# Patient Record
Sex: FEMALE | Race: WHITE | Employment: OTHER | URBAN - METROPOLITAN AREA
[De-identification: names, ages, dates, MRNs, and addresses within clinical notes are randomized per-mention and may not be internally consistent; named-entity substitution may affect disease eponyms.]

---

## 2017-03-15 ENCOUNTER — HOSPITAL ENCOUNTER (OUTPATIENT)
Dept: MAMMOGRAPHY | Age: 72
Discharge: HOME | End: 2017-03-15
Attending: FAMILY MEDICINE
Payer: MEDICARE

## 2017-03-15 DIAGNOSIS — Z12.31 VISIT FOR SCREENING MAMMOGRAM: ICD-10-CM

## 2017-03-15 PROCEDURE — 77063 BREAST TOMOSYNTHESIS BI: CPT

## 2017-03-28 ENCOUNTER — HOSPITAL ENCOUNTER (OUTPATIENT)
Dept: ULTRASOUND IMAGING | Age: 72
Discharge: HOME | End: 2017-03-28
Attending: INTERNAL MEDICINE
Payer: MEDICARE

## 2017-03-28 DIAGNOSIS — K74.60 NON-ALCOHOLIC CIRRHOSIS (HCC): ICD-10-CM

## 2017-03-28 PROCEDURE — 76705 ECHO EXAM OF ABDOMEN: CPT

## 2017-03-28 RX ORDER — GLUCOSAMINE/CHONDR SU A SOD 750-600 MG
TABLET ORAL DAILY
COMMUNITY

## 2017-04-06 ENCOUNTER — ANESTHESIA EVENT (OUTPATIENT)
Dept: ENDOSCOPY | Age: 72
End: 2017-04-06
Payer: MEDICARE

## 2017-04-07 ENCOUNTER — ANESTHESIA (OUTPATIENT)
Dept: ENDOSCOPY | Age: 72
End: 2017-04-07
Payer: MEDICARE

## 2017-04-07 ENCOUNTER — SURGERY (OUTPATIENT)
Age: 72
End: 2017-04-07

## 2017-04-07 ENCOUNTER — HOSPITAL ENCOUNTER (OUTPATIENT)
Age: 72
Setting detail: OUTPATIENT SURGERY
Discharge: HOME | End: 2017-04-07
Attending: INTERNAL MEDICINE | Admitting: INTERNAL MEDICINE
Payer: MEDICARE

## 2017-04-07 VITALS
SYSTOLIC BLOOD PRESSURE: 121 MMHG | TEMPERATURE: 97 F | HEART RATE: 61 BPM | HEIGHT: 68 IN | RESPIRATION RATE: 16 BRPM | WEIGHT: 176 LBS | DIASTOLIC BLOOD PRESSURE: 65 MMHG | BODY MASS INDEX: 26.67 KG/M2 | OXYGEN SATURATION: 100 %

## 2017-04-07 LAB
BUN BLD-MCNC: 25 MG/DL (ref 7–18)
CHLORIDE BLD-SCNC: 106 MMOL/L (ref 100–108)
GLUCOSE BLD STRIP.AUTO-MCNC: 101 MG/DL (ref 74–106)
GLUCOSE BLD STRIP.AUTO-MCNC: 95 MG/DL (ref 70–110)
HCT VFR BLD CALC: 32 % (ref 36–49)
HGB BLD-MCNC: 10.9 G/DL (ref 12–16)
POTASSIUM BLD-SCNC: 3.7 MMOL/L (ref 3.5–5.5)
SODIUM BLD-SCNC: 144 MMOL/L (ref 136–145)

## 2017-04-07 PROCEDURE — 82947 ASSAY GLUCOSE BLOOD QUANT: CPT

## 2017-04-07 PROCEDURE — 82962 GLUCOSE BLOOD TEST: CPT

## 2017-04-07 PROCEDURE — 76040000019: Performed by: INTERNAL MEDICINE

## 2017-04-07 PROCEDURE — 77030008565 HC TBNG SUC IRR ERBE -B: Performed by: INTERNAL MEDICINE

## 2017-04-07 PROCEDURE — 74011250636 HC RX REV CODE- 250/636: Performed by: NURSE ANESTHETIST, CERTIFIED REGISTERED

## 2017-04-07 PROCEDURE — 76060000031 HC ANESTHESIA FIRST 0.5 HR: Performed by: INTERNAL MEDICINE

## 2017-04-07 PROCEDURE — 74011000250 HC RX REV CODE- 250: Performed by: NURSE ANESTHETIST, CERTIFIED REGISTERED

## 2017-04-07 PROCEDURE — 88342 IMHCHEM/IMCYTCHM 1ST ANTB: CPT | Performed by: INTERNAL MEDICINE

## 2017-04-07 PROCEDURE — 77030018846 HC SOL IRR STRL H20 ICUM -A: Performed by: INTERNAL MEDICINE

## 2017-04-07 PROCEDURE — 74011250636 HC RX REV CODE- 250/636

## 2017-04-07 PROCEDURE — 77030019988 HC FCPS ENDOSC DISP BSC -B: Performed by: INTERNAL MEDICINE

## 2017-04-07 PROCEDURE — 88305 TISSUE EXAM BY PATHOLOGIST: CPT | Performed by: INTERNAL MEDICINE

## 2017-04-07 RX ORDER — PROPOFOL 10 MG/ML
INJECTION, EMULSION INTRAVENOUS AS NEEDED
Status: DISCONTINUED | OUTPATIENT
Start: 2017-04-07 | End: 2017-04-07 | Stop reason: HOSPADM

## 2017-04-07 RX ORDER — SODIUM CHLORIDE, SODIUM LACTATE, POTASSIUM CHLORIDE, CALCIUM CHLORIDE 600; 310; 30; 20 MG/100ML; MG/100ML; MG/100ML; MG/100ML
75 INJECTION, SOLUTION INTRAVENOUS CONTINUOUS
Status: DISCONTINUED | OUTPATIENT
Start: 2017-04-07 | End: 2017-04-07 | Stop reason: HOSPADM

## 2017-04-07 RX ORDER — SODIUM CHLORIDE 0.9 % (FLUSH) 0.9 %
5-10 SYRINGE (ML) INJECTION AS NEEDED
Status: DISCONTINUED | OUTPATIENT
Start: 2017-04-07 | End: 2017-04-07 | Stop reason: HOSPADM

## 2017-04-07 RX ORDER — INSULIN LISPRO 100 [IU]/ML
INJECTION, SOLUTION INTRAVENOUS; SUBCUTANEOUS ONCE
Status: DISCONTINUED | OUTPATIENT
Start: 2017-04-07 | End: 2017-04-07 | Stop reason: HOSPADM

## 2017-04-07 RX ORDER — FAMOTIDINE 10 MG/ML
20 INJECTION INTRAVENOUS ONCE
Status: COMPLETED | OUTPATIENT
Start: 2017-04-07 | End: 2017-04-07

## 2017-04-07 RX ORDER — MAGNESIUM SULFATE 100 %
4 CRYSTALS MISCELLANEOUS AS NEEDED
Status: DISCONTINUED | OUTPATIENT
Start: 2017-04-07 | End: 2017-04-07 | Stop reason: HOSPADM

## 2017-04-07 RX ORDER — DEXTROSE 50 % IN WATER (D50W) INTRAVENOUS SYRINGE
25-50 AS NEEDED
Status: DISCONTINUED | OUTPATIENT
Start: 2017-04-07 | End: 2017-04-07 | Stop reason: HOSPADM

## 2017-04-07 RX ORDER — SODIUM CHLORIDE 0.9 % (FLUSH) 0.9 %
5-10 SYRINGE (ML) INJECTION EVERY 8 HOURS
Status: DISCONTINUED | OUTPATIENT
Start: 2017-04-07 | End: 2017-04-07 | Stop reason: HOSPADM

## 2017-04-07 RX ORDER — ONDANSETRON 2 MG/ML
4 INJECTION INTRAMUSCULAR; INTRAVENOUS AS NEEDED
Status: DISCONTINUED | OUTPATIENT
Start: 2017-04-07 | End: 2017-04-07 | Stop reason: HOSPADM

## 2017-04-07 RX ADMIN — PROPOFOL 20 MG: 10 INJECTION, EMULSION INTRAVENOUS at 10:09

## 2017-04-07 RX ADMIN — SODIUM CHLORIDE, SODIUM LACTATE, POTASSIUM CHLORIDE, AND CALCIUM CHLORIDE 75 ML/HR: 600; 310; 30; 20 INJECTION, SOLUTION INTRAVENOUS at 09:22

## 2017-04-07 RX ADMIN — FAMOTIDINE 20 MG: 10 INJECTION, SOLUTION INTRAVENOUS at 09:22

## 2017-04-07 NOTE — H&P
History and Physical reviewed; I have examined the patient and there are no pertinent changes. Justin Roberts MD, MD   10:05 AM 4/7/2017  [facility], [address] [place] [address]  [email or web address]

## 2017-04-07 NOTE — PROCEDURES
Tatianna  [facility], [address]      Brief Procedure Note    Newton Travis  [birth year]  [id number]    Date of Procedure: 4/7/2017    Preoperative diagnosis: Esophageal and gastric varices ([address] [I85.00, I86.4]    Postoperative diagnosis:  Gastric Ulcer, Grade 1 Varicies    Type of Anesthesia: MAC (monitered anesthesia care)    Description of Findings: same as post op dx    Procedure: Procedure(s):  ENDOSCOPY with Bx's    :  Dr. Peterson Adames MD    Assistant(s): [unfilled]    Type of Anesthesia:MAC     EBL:None    Specimens:   ID Type Source Tests Collected by Time Destination   1 : Gastric Ulcer Bx's Preservative Stomach  Peterson Adames MD 4/7/2017 1009 Pathology       Findings: See printed and scanned procedure note    Complications: None    Dr. Peterson Adames MD  4/7/2017  10:17 AM

## 2017-04-07 NOTE — DISCHARGE INSTRUCTIONS
Upper GI Endoscopy: What to Expect at [address] [place] [address]  After you have an endoscopy, you will stay at the hospital or clinic for 1 to 2 hours. This will allow the medicine to wear off. You will be able to go home after your doctor or nurse checks to make sure you are not having any problems. You may have to stay overnight if you had treatment during the test. You may have a sore throat for a day or two after the test.  This care sheet gives you a general idea about what to expect after the test.  How can you care for yourself at home? Activity  · Rest as much as you need to after you go home. · You should be able to go back to your usual activities the day after the test.  Diet  · Follow your doctor's directions for eating after the test.  · Drink plenty of fluids (unless your doctor has told you not to). Medications  · If you have a sore throat the day after the test, use an over-the-counter spray to numb your throat. Follow-up care is a key part of your treatment and safety. Be sure to make and go to all appointments, and call your doctor if you are having problems. It's also a good idea to know your test results and keep a list of the medicines you take. When should you call for help? Call 911 anytime you think you may need emergency care. For example, call if:  · You passed out (lost consciousness). · You cough up blood. · You vomit blood or what looks like coffee grounds. · You pass maroon or very bloody stools. Call your doctor now or seek immediate medical care if:  · You have trouble swallowing. · You have belly pain. · Your stools are black and tarlike or have streaks of blood. · You are sick to your stomach or cannot keep fluids down. Watch closely for changes in your health, and be sure to contact your doctor if:  · Your throat still hurts after a day or two. · You do not get better as expected. Where can you learn more? Go to http://susie.info/.   Enter J454 in the search box to learn more about \"Upper GI Endoscopy: What to Expect at Home. \"  Current as of: August 9, 2016  Content Version: 11.2  © 2969-3376 [employer]. Care instructions adapted under license by [employer] (which disclaims liability or warranty for this information). If you have questions about a medical condition or this instruction, always ask your healthcare professional. [place] [address] any warranty or liability for your use of this information. DISCHARGE SUMMARY from Nurse    The following personal items are in your possession at time of discharge:    Dental Appliances: Uppers, Partials, Lowers  Visual Aid: Glasses                            PATIENT INSTRUCTIONS:    After general anesthesia or intravenous sedation, for 24 hours or while taking prescription Narcotics:  · Limit your activities  · Do not drive and operate hazardous machinery  · Do not make important personal or business decisions  · Do  not drink alcoholic beverages  · If you have not urinated within 8 hours after discharge, please contact your surgeon on call. Report the following to your surgeon:  · Excessive pain, swelling, redness or odor of or around the surgical area  · Temperature over 100.5  · Nausea and vomiting lasting longer than 4 hours or if unable to take medications  · Any signs of decreased circulation or nerve impairment to extremity: change in color, persistent  numbness, tingling, coldness or increase pain  · Any questions        These are general instructions for a healthy lifestyle:    No smoking/ No tobacco products/ Avoid exposure to second hand smoke    Surgeon General's Warning:  Quitting smoking now greatly reduces serious risk to your health.     Obesity, smoking, and sedentary lifestyle greatly increases your risk for illness    A healthy diet, regular physical exercise & weight monitoring are important for maintaining a healthy lifestyle    You may be retaining fluid if you have a history of heart failure or if you experience any of the following symptoms:  Weight gain of 3 pounds or more overnight or 5 pounds in a week, increased swelling in our hands or feet or shortness of breath while lying flat in bed. Please call your doctor as soon as you notice any of these symptoms; do not wait until your next office visit. Recognize signs and symptoms of STROKE:    F-face looks uneven    A-arms unable to move or move unevenly    S-speech slurred or non-existent    T-time-call 911 as soon as signs and symptoms begin-DO NOT go       Back to bed or wait to see if you get better-TIME IS BRAIN. Warning Signs of HEART ATTACK     Call 911 if you have these symptoms:   Chest discomfort. Most heart attacks involve discomfort in the center of the chest that lasts more than a few minutes, or that goes away and comes back. It can feel like uncomfortable pressure, squeezing, fullness, or pain.  Discomfort in other areas of the upper body. Symptoms can include pain or discomfort in one or both arms, the back, neck, jaw, or stomach.  Shortness of breath with or without chest discomfort.  Other signs may include breaking out in a cold sweat, nausea, or lightheadedness. Don't wait more than five minutes to call 911 - MINUTES MATTER! Fast action can save your life. Calling 911 is almost always the fastest way to get lifesaving treatment. Emergency Medical Services staff can begin treatment when they arrive -- up to an hour sooner than if someone gets to the hospital by car. The discharge information has been reviewed with the patient. The patient verbalized understanding. Discharge medications reviewed with the patient and appropriate educational materials and side effects teaching were provided.

## 2017-04-07 NOTE — ANESTHESIA POSTPROCEDURE EVALUATION
Post-Anesthesia Evaluation and Assessment    Patient: Rafy Page MRN: [id number]  SSN: [id number]    YOB: 1945  Age: 70 y.o. Sex: female      Data from PACU flowsheet    Cardiovascular Function/Vital Signs  Visit Vitals    /65    Pulse 61    Temp 36.1 °C (97 °F)    Resp 16    Ht 5' 8\" (1.727 m)    Wt 79.8 kg (176 lb)    SpO2 100%    BMI 26.76 kg/m2       Patient is status post MAC anesthesia for Procedure(s):  ENDOSCOPY with Bx's. Nausea/Vomiting: controlled    Postoperative hydration reviewed and adequate. Pain:  Pain Scale 1: Numeric (0 - 10) (04/07/17 1019)  Pain Intensity 1: 0 (04/07/17 1019)   Managed      Mental Status and Level of Consciousness: Alert and oriented     Pulmonary Status:   O2 Device: Room air (04/07/17 1019)   Adequate oxygenation and airway patent    Complications related to anesthesia: None    Post-anesthesia assessment completed.  No concerns    Signed By: Oscar Fry MD     April 7, 2017

## 2017-04-07 NOTE — ANESTHESIA PREPROCEDURE EVALUATION
Anesthetic History   No history of anesthetic complications       Comments:   Risk Factors for Postoperative nausea/vomiting:       History of postoperative nausea/vomiting? NO       Female? YES       Motion sickness? NO       Intended opioid administration for postoperative analgesia?   NO     Review of Systems / Medical History  Patient summary reviewed and pertinent labs reviewed    Pulmonary  Within defined limits                 Neuro/Psych   Within defined limits           Cardiovascular    Hypertension                   GI/Hepatic/Renal           Liver disease     Endo/Other    Diabetes: poorly controlled    Blood dyscrasia and anemia     Other Findings            Physical Exam    Airway  Mallampati: II  TM Distance: 4 - 6 cm  Neck ROM: normal range of motion   Mouth opening: Normal     Cardiovascular    Rhythm: regular  Rate: normal         Dental    Dentition: Full upper dentures and Lower partial plate     Pulmonary  Breath sounds clear to auscultation               Abdominal  GI exam deferred       Other Findings            Anesthetic Plan    ASA: 3  Anesthesia type: MAC          Induction: Intravenous  Anesthetic plan and risks discussed with: Patient

## 2017-04-07 NOTE — IP AVS SNAPSHOT
Phillip Cameron 
 
 
 [address] [id number] 
[phone] Patient: Buck Sales MRN: [id number] :1945 You are allergic to the following Allergen Reactions Augmentin (Amoxicillin-Pot Clavulanate) Other (comments) Mouth sores Cefepime Rash Arm rashes, chest rashes Ciprofloxacin Rash Other reaction(s): mild rash/itching Only w/ IV Cipro, takes PO Cipro without problem Recent Documentation Height Weight BMI OB Status Smoking Status 1.727 m 79.8 kg 26.76 kg/m2 Postmenopausal Never Smoker Emergency Contacts Name Discharge Info Relation Home Work Mobile Keysha CAREGIVER [3] Son [22] [phone] [facility] Michelet  DISCHARGE CAREGIVER [3] Daughter [21] [phone] [address] CAREGIVER [3] Son [22] [phone] [address]  Child [2] [phone] About your hospitalization You were admitted on:  2017 You last received care in the:  [facility] PACU You were discharged on:  2017 Unit phone number:  [phone] Why you were hospitalized Your primary diagnosis was:  Not on File Providers Seen During Your Hospitalizations Provider Role Specialty Primary office phone Martha Montenegro MD Attending Provider Gastroenterology [phone] Your Primary Care Physician (PCP) Primary Care Physician Office Phone Office Fax Ewa Martin [phone] Follow-up Information Follow up With Details Comments Contact Info Tristan Young MD   [facility] [facility] 
[phone] Martha Montenegro MD Follow up in 4 month(s)  [address] [facility] [address] [facility] [address] 
[phone] Current Discharge Medication List  
  
 CONTINUE these medications which have CHANGED Dose & Instructions Dispensing Information Comments Morning Noon Evening Bedtime  
 ascorbic acid (vitamin C) 250 mg tablet Commonly known as:  VITAMIN C What changed:   
- how much to take - when to take this Your last dose was: Your next dose is:    
   
   
 Dose:  250 mg Take 1 Tab by mouth two (2) times daily (with meals). Quantity:  30 Tab Refills:  0 CONTINUE these medications which have NOT CHANGED Dose & Instructions Dispensing Information Comments Morning Noon Evening Bedtime  
 acetaminophen 325 mg tablet Commonly known as:  TYLENOL Your last dose was: Your next dose is:    
   
   
 Dose:  650 mg Take 2 Tabs by mouth every four (4) hours as needed for Pain or Fever. Quantity:  15 Tab Refills:  0  
     
   
   
   
  
 aspirin delayed-release 81 mg tablet Your last dose was: Your next dose is:    
   
   
 Dose:  81 mg Take 1 Tab by mouth daily. Quantity:  30 Tab Refills:  5 Biotin 2,500 mcg Cap Your last dose was: Your next dose is: Take  by mouth daily. Refills:  0  
     
   
   
   
  
 cholecalciferol 1,000 unit tablet Commonly known as:  VITAMIN D3 Your last dose was: Your next dose is:    
   
   
 Dose:  2000 Units Take 2 Tabs by mouth daily. Quantity:  30 Tab Refills:  0  
     
   
   
   
  
 ferrous sulfate 325 mg (65 mg iron) tablet Your last dose was: Your next dose is:    
   
   
 Dose:  325 mg Take 1 Tab by mouth two (2) times daily (with meals). Quantity:  30 Tab Refills:  0  
     
   
   
   
  
 potassium chloride 20 mEq tablet Commonly known as:  K-DUR, KLOR-CON Your last dose was: Your next dose is:    
   
   
 Dose:  20 mEq Take 20 mEq by mouth daily. Refills:  0 simvastatin 40 mg tablet Commonly known as:  ZOCOR Your last dose was: Your next dose is:    
   
   
 Dose:  20 mg Take 20 mg by mouth nightly. Refills:  0  
     
   
   
   
  
 TRAVATAN Z 0.004 % ophthalmic solution Generic drug:  travoprost  
   
Your last dose was: Your next dose is:    
   
   
 Dose:  1 Drop Administer 1 Drop to both eyes every evening. Refills:  0  
     
   
   
   
  
 VITAMIN D2 PO Your last dose was: Your next dose is: Take  by mouth every seven (7) days. Refills:  0 Discharge Instructions Upper GI Endoscopy: What to Expect at [facility] Your Recovery After you have an endoscopy, you will stay at the hospital or clinic for 1 to 2 hours. This will allow the medicine to wear off. You will be able to go home after your doctor or nurse checks to make sure you are not having any problems. You may have to stay overnight if you had treatment during the test. You may have a sore throat for a day or two after the test. 
This care sheet gives you a general idea about what to expect after the test. 
How can you care for yourself at home? Activity · Rest as much as you need to after you go home. · You should be able to go back to your usual activities the day after the test. 
Diet · Follow your doctor's directions for eating after the test. 
· Drink plenty of fluids (unless your doctor has told you not to). Medications · If you have a sore throat the day after the test, use an over-the-counter spray to numb your throat. Follow-up care is a key part of your treatment and safety. Be sure to make and go to all appointments, and call your doctor if you are having problems. It's also a good idea to know your test results and keep a list of the medicines you take. When should you call for help? Call 911 anytime you think you may need emergency care. For example, call if: · You passed out (lost consciousness). · You cough up blood. · You vomit blood or what looks like coffee grounds. · You pass maroon or very bloody stools. Call your doctor now or seek immediate medical care if: 
· You have trouble swallowing. · You have belly pain. · Your stools are black and tarlike or have streaks of blood. · You are sick to your stomach or cannot keep fluids down. Watch closely for changes in your health, and be sure to contact your doctor if: 
· Your throat still hurts after a day or two. · You do not get better as expected. Where can you learn more? Go to http://sybil-layo.info/. Enter [phone] in the search box to learn more about \"Upper GI Endoscopy: What to Expect at Home. \" Current as of: August 9, 2016 Content Version: 11.2 © 3545-8621 [employer]. Care instructions adapted under license by [employer] (which disclaims liability or warranty for this information). If you have questions about a medical condition or this instruction, always ask your healthcare professional. [place] [address] any warranty or liability for your use of this information. DISCHARGE SUMMARY from Nurse The following personal items are in your possession at time of discharge: 
 
Dental Appliances: Uppers, Partials, Lowers Visual Aid: Glasses PATIENT INSTRUCTIONS: 
 
After general anesthesia or intravenous sedation, for 24 hours or while taking prescription Narcotics: · Limit your activities · Do not drive and operate hazardous machinery · Do not make important personal or business decisions · Do  not drink alcoholic beverages · If you have not urinated within 8 hours after discharge, please contact your surgeon on call. Report the following to your surgeon: 
· Excessive pain, swelling, redness or odor of or around the surgical area · Temperature over 100.5 · Nausea and vomiting lasting longer than 4 hours or if unable to take medications · Any signs of decreased circulation or nerve impairment to extremity: change in color, persistent  numbness, tingling, coldness or increase pain · Any questions These are general instructions for a healthy lifestyle: No smoking/ No tobacco products/ Avoid exposure to second hand smoke Surgeon General's Warning:  Quitting smoking now greatly reduces serious risk to your health. Obesity, smoking, and sedentary lifestyle greatly increases your risk for illness A healthy diet, regular physical exercise & weight monitoring are important for maintaining a healthy lifestyle You may be retaining fluid if you have a history of heart failure or if you experience any of the following symptoms:  Weight gain of 3 pounds or more overnight or 5 pounds in a week, increased swelling in our hands or feet or shortness of breath while lying flat in bed. Please call your doctor as soon as you notice any of these symptoms; do not wait until your next office visit. Recognize signs and symptoms of STROKE: 
 
F-face looks uneven A-arms unable to move or move unevenly S-speech slurred or non-existent T-time-call 911 as soon as signs and symptoms begin-DO NOT go Back to bed or wait to see if you get better-TIME IS BRAIN. Warning Signs of HEART ATTACK Call 911 if you have these symptoms: 
? Chest discomfort. Most heart attacks involve discomfort in the center of the chest that lasts more than a few minutes, or that goes away and comes back. It can feel like uncomfortable pressure, squeezing, fullness, or pain. ? Discomfort in other areas of the upper body. Symptoms can include pain or discomfort in one or both arms, the back, neck, jaw, or stomach. ? Shortness of breath with or without chest discomfort. ? Other signs may include breaking out in a cold sweat, nausea, or lightheadedness. Don't wait more than five minutes to call [address]! Fast action can save your life. Calling 911 is almost always the fastest way to get lifesaving treatment. Emergency Medical Services staff can begin treatment when they arrive  up to an hour sooner than if someone gets to the hospital by car. The discharge information has been reviewed with the {PATIENT PARENT GUARDIAN:75524}. The {PATIENT PARENT GUARDIAN:54097} verbalized understanding. Discharge medications reviewed with the {Dishcarge meds reviewed [facility]76[facility]} and appropriate educational materials and side effects teaching were provided. Discharge Orders None Introducing [facility] [place] [facility]! Dear Emerita Aguiar: 
Thank you for requesting a Yapta account. Our records indicate that you have previously registered for a Yapta account but its currently inactive. Please call our Yapta support line at [phone]. Additional Information If you have questions, please visit the Frequently Asked Questions section of the Yapta website at [email or web address] Remember, Yapta is NOT to be used for urgent needs. For medical emergencies, dial 911. Now available from your iPhone and Android! General Information Please provide this summary of care documentation to your next provider. Patient Signature:  ____________________________________________________________ Date:  ____________________________________________________________  
  
Brendan Lacy Provider Signature:  ____________________________________________________________ Date:  ____________________________________________________________

## 2017-04-22 ENCOUNTER — HOSPITAL ENCOUNTER (EMERGENCY)
Age: 72
Discharge: HOME | End: 2017-04-22
Attending: EMERGENCY MEDICINE
Payer: MEDICARE

## 2017-04-22 VITALS
SYSTOLIC BLOOD PRESSURE: 119 MMHG | DIASTOLIC BLOOD PRESSURE: 47 MMHG | TEMPERATURE: 99.2 F | HEART RATE: 66 BPM | RESPIRATION RATE: 12 BRPM | OXYGEN SATURATION: 97 %

## 2017-04-22 DIAGNOSIS — G51.0 BELL'S PALSY: Primary | ICD-10-CM

## 2017-04-22 PROCEDURE — 99283 EMERGENCY DEPT VISIT LOW MDM: CPT

## 2017-04-22 RX ORDER — VALACYCLOVIR HYDROCHLORIDE 1 G/1
1000 TABLET, FILM COATED ORAL 3 TIMES DAILY
Qty: 21 TAB | Refills: 0 | Status: SHIPPED | OUTPATIENT
Start: 2017-04-22 | End: 2017-04-29

## 2017-04-22 RX ORDER — PREDNISONE 20 MG/1
60 TABLET ORAL DAILY
Qty: 21 TAB | Refills: 0 | Status: SHIPPED | OUTPATIENT
Start: 2017-04-22 | End: 2017-04-29

## 2017-04-22 NOTE — DISCHARGE INSTRUCTIONS
Bell's Palsy: Care Instructions  Your Care Instructions    Bell's palsy is paralysis or weakness of the muscles on one side of the face. Often people with Bell's palsy have a droop on one side of the mouth and have trouble completely shutting the eye on the same side. Bell's palsy can also interfere with the sense of taste. These things happen when a nerve in your face becomes inflamed. Bell's palsy is not caused by a stroke. The cause of the nerve inflammation is not known. But some experts think that a virus may cause it. Because of this, doctors sometimes prescribe antiviral medicine to treat it. You also may get medicine to reduce swelling. Bell's palsy usually gets better on its own in a few weeks or months. Follow-up care is a key part of your treatment and safety. Be sure to make and go to all appointments, and call your doctor if you are having problems. It's also a good idea to know your test results and keep a list of the medicines you take. How can you care for yourself at home? · Take your medicines exactly as prescribed. Call your doctor if you think you are having a problem with your medicine. You will get more details on the specific medicines your doctor prescribes. · Use artificial tears or ointment if your eyes are too dry. Bell's palsy can make your lower eyelid droop, causing a dry eye. · If you cannot completely close your eye, consider using an eye patch while you sleep. · Help yourself blink by using your finger to close and open your eyelid. This may help keep your eye moist.  · Wear glasses or goggles to keep dust and dirt out of your eye. · As feeling comes back to your face, massage your forehead, cheeks, and lips. Massage may make the muscles in your face stronger. · Brush and floss your teeth often to help prevent tooth decay. Bell's palsy can dry up the spit on one side of your mouth. This increases the risk of tooth decay. When should you call for help?   Call 911 anytime you think you may need emergency care. For example, call if:  · You have symptoms of a stroke. These may include:  ¨ Sudden numbness, tingling, weakness, or loss of movement in your face, arm, or leg, especially on only one side of your body. ¨ Sudden vision changes. ¨ Sudden trouble speaking. ¨ Sudden confusion or trouble understanding simple statements. ¨ Sudden problems with walking or balance. ¨ A sudden, severe headache that is different from past headaches. Call your doctor now or seek immediate medical care if:  · You have numbness or weakness that spreads beyond one side of your face. · You have a skin rash or eye pain or redness, or light bothers your eyes. · You have a new or worse headache. Watch closely for changes in your health, and be sure to contact your doctor if:  · You do not get better as expected. Where can you learn more? Go to http://sybil-layo.info/. Enter P168 in the search box to learn more about \"Bell's Palsy: Care Instructions. \"  Current as of: October 14, 2016  Content Version: 11.2  © 1600-7289 [employer]. Care instructions adapted under license by [employer] (which disclaims liability or warranty for this information). If you have questions about a medical condition or this instruction, always ask your healthcare professional. [place] [address] any warranty or liability for your use of this information.

## 2017-04-22 NOTE — ED PROVIDER NOTES
HPI Comments: 9:28 AM Alex Pimentel is a 70 y.o. female anemia, dyslipidemia, HYN, and thrombocytopoenia, who presents to the ED for the evaluation of R sided facial droop. Pt states that she noticed her R eye watering yesterday and facial droop started this morning. States that she doesn't hear a difference in her speech. No unusual numbness or tingling in her extremities, gait difficulty, unusual weight loss, facial pain or ear pain. States that she had a HA about 2 days ago, but has since resolved. Denies smoking or drinking. No relieving or exacerbating factors. No other complaints at this time. PCP: Rebekah West MD     The history is provided by the patient.         Past Medical History:   Diagnosis Date    Abscess of left kidney 2015    Acute deep vein thrombosis of right lower extremity ([address] 3/02/2015    Anticoagulated by anticoagulation treatment 3/06/2015    On Rivaroxaban    Candidal urinary tract infection 3/12/2015    Chronic anemia     Chronic diastolic heart failure (HCC)     Citrobacter infection 2/10/2015    Urinary tract infection    Decubitus ulcer of sacral region, stage 2     Dyslipidemia     Glaucoma     History of hypertension     History of kidney stones 2012    Hypertension     resolved    Hypoalbuminemia     Liver disease     non alcoholic cirrhosis    Obesity, Class II, BMI 35-39.9 (HCC)     Pneumothorax on right 2015    Postoperative anemia due to acute blood loss     Thrombocytopenia (HCC)     Dr. Cassie Tom    Type 2 diabetes mellitus ([facility]     diet controlled       Past Surgical History:   Procedure Laterality Date    HX  SECTION      HX COLOSTOMY  2015    HX LITHOTRIPSY      5 times    HX OTHER SURGICAL  1977    excision nodule thyroid    HX OTHER SURGICAL  2015    S/P CT-guided cholecystostomy tube placement (2015 - Dr. Jacobo Grullon)    HX OTHER SURGICAL Left 2015    S/P CT-guided left lower renal pole abscess drainage and drainage catheter placement (2/16/2015 - Dr. Gayathri Choe)    HX OTHER SURGICAL Left 2/17/2015    S/P Percutaneous drainage of abscess via anterior abdomen (2/17/2015 - Dr. Bridgett Dsa)    HX OTHER SURGICAL Right 2/27/2015    S/P Right chest tube insertion (2/27/2015 - Dr. Bridgett Das)    HX RENAL BIOPSY Left 2/16/2015    S/P CT-guided left lower renal pole biopsy (2/16/2015 - Dr. Gayathri Choe)         Family History:   Problem Relation Age of Onset    Heart Attack Father     Heart Attack Brother        Social History     Social History    Marital status:      Spouse name: N/A    Number of children: N/A    Years of education: N/A     Occupational History    Not on file. Social History Main Topics    Smoking status: Never Smoker    Smokeless tobacco: Not on file    Alcohol use No    Drug use: No    Sexual activity: No     Other Topics Concern    Not on file     Social History Narrative         ALLERGIES: Augmentin [amoxicillin-pot clavulanate]; Cefepime; and Ciprofloxacin    Review of Systems   Constitutional: Negative for chills, fatigue, fever and unexpected weight change. HENT: Negative for congestion and rhinorrhea. Respiratory: Negative for chest tightness and shortness of breath. Cardiovascular: Negative for chest pain, palpitations and leg swelling. Gastrointestinal: Negative for abdominal pain, nausea and vomiting. Genitourinary: Negative for dysuria. Musculoskeletal: Negative for back pain. Skin: Negative for rash. Neurological: Positive for facial asymmetry. Negative for dizziness and weakness. Psychiatric/Behavioral: The patient is not nervous/anxious. Vitals:    04/22/17 0927 04/22/17 0934   BP: 154/63 154/63   Pulse:  74   Resp: 16 16   Temp:  99.2 °F (37.3 °C)   SpO2: 99%         99% within normal limits     Physical Exam   Constitutional: She is oriented to person, place, and time.  She appears well-developed and well-nourished. No distress. HENT:   Head: Normocephalic and atraumatic. Right Ear: External ear normal.   Left Ear: External ear normal.   Nose: Nose normal.   Mouth/Throat: Oropharynx is clear and moist.   Eyes: Conjunctivae and EOM are normal. Pupils are equal, round, and reactive to light. No scleral icterus. Neck: Normal range of motion. Neck supple. No JVD present. No tracheal deviation present. No thyromegaly present. Cardiovascular: Normal rate, regular rhythm, normal heart sounds and intact distal pulses. Exam reveals no gallop and no friction rub. No murmur heard. Pulmonary/Chest: Effort normal and breath sounds normal. She exhibits no tenderness. Abdominal: Soft. Bowel sounds are normal. She exhibits no distension. There is no tenderness. There is no rebound and no guarding. Musculoskeletal: Normal range of motion. She exhibits no edema or tenderness. Lymphadenopathy:     She has no cervical adenopathy. Neurological: She is alert and oriented to person, place, and time. A cranial nerve deficit is present. Coordination normal.   Mild dysarthria, unable to close the R lid completely, dense R sided asymmetry of the mouth, involves the frontal scalp but able to raise the R brow slightly, no arm or leg drift    Skin: Skin is warm and dry. Rash noted. Psychiatric: She has a normal mood and affect. Her behavior is normal. Judgment and thought content normal.   Nursing note and vitals reviewed. MDM  Number of Diagnoses or Management Options  Bell's palsy:   Diagnosis management comments: Pt is a 79yo female with a hx of PTX, anemia, glaucoma, HTN, DVT not on AC, thrombocytopenia presents with a dense R sided facial weakness noted to begin yesterday. Appears to be an isolated facial nerve palsy to suggest Bell's palsy. Pt BP is reassuring as initial BP appeared to be inaccurate. Pt has no arm or leg symptoms and has mild R sided facial rash but not in or around the [facility].   Suspect Osorio's Palsy, has some possible frontal sparing so will consult tele neurology. If agreement will proceed with steroids with close supportive care for the eye and proceed with close outpatient care. Anisa Murillo,  10:00 AM      ED Course       Procedures      Medications ordered:   Medications - No data to display      Progress notes, Consult notes or additional Procedure notes:   9:49 AM Consult:  Discussed care with sally Ambriz. Standard discussion; including history of patients chief complaint, available diagnostic results, and treatment course. Agrees to evaluate the pt.     10:00 AM Consult:  Discussed care with sally Ambriz. Standard discussion; including history of patients chief complaint, available diagnostic results, and treatment course. States that he thinks it is a classic case of Bell's Palsy. No antivirals at this time. 10:08 AM: I have reassessed the patient and discussed their results and diagnosis. Pt will be discharged in stable condition and treated for facial rash. Patient understands and verbalizes agreement with plan. Reevaluation of patient:   I have reevaluated patient. Patient is feeling better    Dispo:  Patient was discharged home in stable condition. Patient is to return to emergency department with any new or worsening condition. 1. Bell's palsy        Follow-up Information     Follow up With Details Comments Contact Info    Marcin Gill MD In 2 days  [address]      [id number] [place] [address] EMERGENCY DEPT  If symptoms worsen Jozef Costello [phone]  [address] [place] [address] STATEMENT  Documented by: Hima Noble for, and in the presence of, Chantal Woodward MD 9:31 AM     Signed by: Tatiana Beckford, 4/22/2017 9:31 AM     PROVIDER ATTESTATION STATEMENT  I personally performed the services described in the documentation, reviewed the documentation, as recorded by the scribe in my presence, and it accurately and completely records my words and actions.   Shanelle Medina MD

## 2017-04-22 NOTE — ED TRIAGE NOTES
Onset yesterday of right facial droop. Reddened area around right eye.  Alert and oriented x4 , GOMEZ,

## 2017-04-22 NOTE — ED NOTES
Pt states ready for discharge. Pt states she will follow up with PCP as instructed by provider. Pt appears in NOAD. I have reviewed discharge instructions with the patient. Prescriptions were reviewed with patient instructed not to drink alcohol, drive a car, or operate heavy machinery while taking this medicine. The patient verbalized understanding. Patient seen leaving ED ambulatory without difficulty or need for assistance, with S/O in no sign of distress. Patient armband removed and shredded    Current Discharge Medication List      START taking these medications    Details   predniSONE (DELTASONE) 20 mg tablet Take 3 Tabs by mouth daily for 7 days. With Breakfast  Qty: 21 Tab, Refills: 0      valACYclovir (VALTREX) 1 gram tablet Take 1 Tab by mouth three (3) times daily for 7 days. Qty: 21 Tab, Refills: 0      white petrolatum-mineral oil (LACRILUBE S.O.P.) 56.8-42.5 % ointment Administer 1 g to right eye three (3) times daily.   Qty: 3.5 g, Refills: 1

## 2017-04-22 NOTE — ED NOTES
Pt states that she noticed her rt eye weeping and some facial drooping yesterday afternoon. States that sym became worse she she came to the ED.  Dr Rylee Coats examining pt

## 2017-06-23 ENCOUNTER — ANESTHESIA EVENT (OUTPATIENT)
Dept: ENDOSCOPY | Age: 72
End: 2017-06-23
Payer: MEDICARE

## 2017-06-26 ENCOUNTER — ANESTHESIA (OUTPATIENT)
Dept: ENDOSCOPY | Age: 72
End: 2017-06-26
Payer: MEDICARE

## 2017-06-26 ENCOUNTER — HOSPITAL ENCOUNTER (OUTPATIENT)
Age: 72
Setting detail: OUTPATIENT SURGERY
Discharge: HOME | End: 2017-06-26
Attending: INTERNAL MEDICINE | Admitting: INTERNAL MEDICINE
Payer: MEDICARE

## 2017-06-26 VITALS
DIASTOLIC BLOOD PRESSURE: 57 MMHG | SYSTOLIC BLOOD PRESSURE: 130 MMHG | BODY MASS INDEX: 26.22 KG/M2 | WEIGHT: 177 LBS | OXYGEN SATURATION: 98 % | HEART RATE: 58 BPM | RESPIRATION RATE: 15 BRPM | HEIGHT: 69 IN | TEMPERATURE: 97.8 F

## 2017-06-26 LAB
ERYTHROCYTE [DISTWIDTH] IN BLOOD BY AUTOMATED COUNT: 13.9 % (ref 11.6–14.5)
GLUCOSE BLD STRIP.AUTO-MCNC: 108 MG/DL (ref 70–110)
GLUCOSE BLD STRIP.AUTO-MCNC: 89 MG/DL (ref 70–110)
HCT VFR BLD AUTO: 35.2 % (ref 35–45)
HGB BLD-MCNC: 11.4 G/DL (ref 12–16)
MCH RBC QN AUTO: 28.7 PG (ref 24–34)
MCHC RBC AUTO-ENTMCNC: 32.4 G/DL (ref 31–37)
MCV RBC AUTO: 88.7 FL (ref 74–97)
PLATELET # BLD AUTO: 42 K/UL (ref 135–420)
PMV BLD AUTO: 10.3 FL (ref 9.2–11.8)
RBC # BLD AUTO: 3.97 M/UL (ref 4.2–5.3)
WBC # BLD AUTO: 3.2 K/UL (ref 4.6–13.2)

## 2017-06-26 PROCEDURE — 85027 COMPLETE CBC AUTOMATED: CPT | Performed by: INTERNAL MEDICINE

## 2017-06-26 PROCEDURE — 76060000031 HC ANESTHESIA FIRST 0.5 HR: Performed by: INTERNAL MEDICINE

## 2017-06-26 PROCEDURE — 76040000019: Performed by: INTERNAL MEDICINE

## 2017-06-26 PROCEDURE — 77030019988 HC FCPS ENDOSC DISP BSC -B: Performed by: INTERNAL MEDICINE

## 2017-06-26 PROCEDURE — 74011250636 HC RX REV CODE- 250/636

## 2017-06-26 PROCEDURE — 77030018846 HC SOL IRR STRL H20 ICUM -A: Performed by: INTERNAL MEDICINE

## 2017-06-26 PROCEDURE — 74011000250 HC RX REV CODE- 250: Performed by: NURSE ANESTHETIST, CERTIFIED REGISTERED

## 2017-06-26 PROCEDURE — 74011250636 HC RX REV CODE- 250/636: Performed by: NURSE ANESTHETIST, CERTIFIED REGISTERED

## 2017-06-26 PROCEDURE — 88305 TISSUE EXAM BY PATHOLOGIST: CPT | Performed by: INTERNAL MEDICINE

## 2017-06-26 PROCEDURE — 77030008565 HC TBNG SUC IRR ERBE -B: Performed by: INTERNAL MEDICINE

## 2017-06-26 PROCEDURE — 88342 IMHCHEM/IMCYTCHM 1ST ANTB: CPT | Performed by: INTERNAL MEDICINE

## 2017-06-26 PROCEDURE — 82962 GLUCOSE BLOOD TEST: CPT

## 2017-06-26 PROCEDURE — 74011000250 HC RX REV CODE- 250

## 2017-06-26 RX ORDER — DEXTROSE 50 % IN WATER (D50W) INTRAVENOUS SYRINGE
25-50 AS NEEDED
Status: DISCONTINUED | OUTPATIENT
Start: 2017-06-26 | End: 2017-06-26 | Stop reason: HOSPADM

## 2017-06-26 RX ORDER — SODIUM CHLORIDE 0.9 % (FLUSH) 0.9 %
5-10 SYRINGE (ML) INJECTION AS NEEDED
Status: DISCONTINUED | OUTPATIENT
Start: 2017-06-26 | End: 2017-06-26 | Stop reason: HOSPADM

## 2017-06-26 RX ORDER — INSULIN LISPRO 100 [IU]/ML
INJECTION, SOLUTION INTRAVENOUS; SUBCUTANEOUS ONCE
Status: DISCONTINUED | OUTPATIENT
Start: 2017-06-26 | End: 2017-06-26 | Stop reason: HOSPADM

## 2017-06-26 RX ORDER — SODIUM CHLORIDE 0.9 % (FLUSH) 0.9 %
5-10 SYRINGE (ML) INJECTION EVERY 8 HOURS
Status: DISCONTINUED | OUTPATIENT
Start: 2017-06-26 | End: 2017-06-26 | Stop reason: HOSPADM

## 2017-06-26 RX ORDER — LIDOCAINE HYDROCHLORIDE 20 MG/ML
INJECTION, SOLUTION EPIDURAL; INFILTRATION; INTRACAUDAL; PERINEURAL AS NEEDED
Status: DISCONTINUED | OUTPATIENT
Start: 2017-06-26 | End: 2017-06-26 | Stop reason: HOSPADM

## 2017-06-26 RX ORDER — ONDANSETRON 2 MG/ML
4 INJECTION INTRAMUSCULAR; INTRAVENOUS ONCE
Status: DISCONTINUED | OUTPATIENT
Start: 2017-06-26 | End: 2017-06-26 | Stop reason: HOSPADM

## 2017-06-26 RX ORDER — SODIUM CHLORIDE, SODIUM LACTATE, POTASSIUM CHLORIDE, CALCIUM CHLORIDE 600; 310; 30; 20 MG/100ML; MG/100ML; MG/100ML; MG/100ML
75 INJECTION, SOLUTION INTRAVENOUS CONTINUOUS
Status: DISCONTINUED | OUTPATIENT
Start: 2017-06-26 | End: 2017-06-26 | Stop reason: HOSPADM

## 2017-06-26 RX ORDER — PROPOFOL 10 MG/ML
INJECTION, EMULSION INTRAVENOUS AS NEEDED
Status: DISCONTINUED | OUTPATIENT
Start: 2017-06-26 | End: 2017-06-26 | Stop reason: HOSPADM

## 2017-06-26 RX ORDER — MAGNESIUM SULFATE 100 %
4 CRYSTALS MISCELLANEOUS AS NEEDED
Status: DISCONTINUED | OUTPATIENT
Start: 2017-06-26 | End: 2017-06-26 | Stop reason: HOSPADM

## 2017-06-26 RX ADMIN — PROPOFOL 20 MG: 10 INJECTION, EMULSION INTRAVENOUS at 10:48

## 2017-06-26 RX ADMIN — PROPOFOL 20 MG: 10 INJECTION, EMULSION INTRAVENOUS at 10:51

## 2017-06-26 RX ADMIN — FAMOTIDINE 20 MG: 10 INJECTION, SOLUTION INTRAVENOUS at 10:06

## 2017-06-26 RX ADMIN — PROPOFOL 60 MG: 10 INJECTION, EMULSION INTRAVENOUS at 10:45

## 2017-06-26 RX ADMIN — LIDOCAINE HYDROCHLORIDE 60 MG: 20 INJECTION, SOLUTION EPIDURAL; INFILTRATION; INTRACAUDAL; PERINEURAL at 10:45

## 2017-06-26 RX ADMIN — SODIUM CHLORIDE, SODIUM LACTATE, POTASSIUM CHLORIDE, AND CALCIUM CHLORIDE 75 ML/HR: 600; 310; 30; 20 INJECTION, SOLUTION INTRAVENOUS at 10:05

## 2017-06-26 NOTE — ANESTHESIA POSTPROCEDURE EVALUATION
Post-Anesthesia Evaluation and Assessment    Patient: Priti May MRN: [id number]  SSN: [id number]    YOB: 1945  Age: 70 y.o. Sex: female       Cardiovascular Function/Vital Signs  Visit Vitals    /57    Pulse (!) 58    Temp 36.6 °C (97.8 °F)    Resp 15    Ht 5' 9\" (1.753 m)    Wt 80.3 kg (177 lb)    SpO2 98%    Breastfeeding No    BMI 26.14 kg/m2       Patient is status post MAC anesthesia for Procedure(s):  ENDOSCOPY with biopsy. Nausea/Vomiting: None    Postoperative hydration reviewed and adequate. Pain:  Pain Scale 1: Numeric (0 - 10) (06/26/17 1148)  Pain Intensity 1: 0 (06/26/17 1148)   Managed    Neurological Status:   Neuro (WDL): Within Defined Limits (06/26/17 1058)   At baseline    Mental Status and Level of Consciousness: Arousable    Pulmonary Status:   O2 Device: Room air (06/26/17 1148)   Adequate oxygenation and airway patent    Complications related to anesthesia: None    Post-anesthesia assessment completed.  No concerns    Signed By: Consuelo Herman MD     June 26, 2017

## 2017-06-26 NOTE — H&P
Chief Complaint: PUD    History of present illness: EGD to confirm healing of ulcer. PMH:   Past Medical History:   Diagnosis Date    Abscess of left kidney 2/16/2015    Acute deep vein thrombosis of right lower extremity ([facility] 3/02/2015    Anticoagulated by anticoagulation treatment 3/06/2015    On Rivaroxaban    Bell's palsy 04/22/2017    Candidal urinary tract infection 3/12/2015    Chronic anemia     Chronic diastolic heart failure (HCC)     Citrobacter infection 2/10/2015    Urinary tract infection    Decubitus ulcer of sacral region, stage 2     Dyslipidemia     Glaucoma     History of hypertension     History of kidney stones 6/19/2012    Hypertension     resolved    Hypoalbuminemia     Liver disease     non alcoholic cirrhosis    Obesity, Class II, BMI 35-39.9 (HCC)     Pneumothorax on right 2/27/2015    Postoperative anemia due to acute blood loss     Thrombocytopenia (HCC)     Dr. Amie Veliz    Type 2 diabetes mellitus ([facility]     diet controlled     Allergies:    Allergies   Allergen Reactions    Augmentin [Amoxicillin-Pot Clavulanate] Other (comments)     Mouth sores    Cefepime Rash     Arm rashes, chest rashes    Ciprofloxacin Rash     Other reaction(s): mild rash/itching  Only w/ IV Cipro, takes PO Cipro without problem     Medications:   Current Facility-Administered Medications:     sodium chloride (NS) flush 5-10 mL, 5-10 mL, IntraVENous, Q8H, Martha Estevez CRNA    sodium chloride (NS) flush 5-10 mL, 5-10 mL, IntraVENous, PRN, Martha Estevez CRNA    insulin lispro (HUMALOG) injection, , SubCUTAneous, ONCE, Martha Estevez CRNA    glucose chewable tablet 16 g, 4 Tab, Oral, PRN, Martha Estevez CRNA    glucagon (GLUCAGEN) injection 1 mg, 1 mg, IntraMUSCular, PRN, Martha Estevez CRNA    dextrose (D50W) injection syrg 12.5-25 g, 25-50 mL, IntraVENous, PRN, Martha Estevez CRNA    lactated Ringers infusion, 75 mL/hr, IntraVENous, CONTINUOUS, Martha Estevez CRNA, Last Rate: 75 mL/hr at 17 1005, 75 mL/hr at 17 1005  FH:   Family History   Problem Relation Age of Onset    Heart Attack Father     Heart Attack Brother      Social:   Social History     Social History    Marital status:      Spouse name: N/A    Number of children: N/A    Years of education: N/A     Social History Main Topics    Smoking status: Never Smoker    Smokeless tobacco: Never Used    Alcohol use No    Drug use: No    Sexual activity: No     Other Topics Concern    None     Social History Narrative     Surgical H:   Past Surgical History:   Procedure Laterality Date    HX  SECTION      HX COLOSTOMY  2015    HX LITHOTRIPSY      5 times    HX OTHER SURGICAL  1977    excision nodule thyroid    HX OTHER SURGICAL  2015    S/P CT-guided cholecystostomy tube placement (2015 - Dr. Tiffanie Escobar)    HX OTHER SURGICAL Left 2015    S/P CT-guided left lower renal pole abscess drainage and drainage catheter placement (2015 - Dr. Tiffanie Escobar)    HX OTHER SURGICAL Left 2015    S/P Percutaneous drainage of abscess via anterior abdomen (2015 - Dr. Omar Steven)    HX OTHER SURGICAL Right 2015    S/P Right chest tube insertion (2015 - Dr. Omar Steven)    HX RENAL BIOPSY Left 2015    S/P CT-guided left lower renal pole biopsy (2015 - Dr. Tiffanie Escobar)       ROS: negative    Physical Exam:   Visit Vitals    /66    Pulse (!) 56    Temp 97.3 °F (36.3 °C)    Resp 16    Ht 5' 9\" (1.753 m)    Wt 80.3 kg (177 lb)    SpO2 98%    Breastfeeding No    BMI 26.14 kg/m2     General appearance: alert, no distress  Eyes: pupils equal and reactive, extraocular eye movements intact  Nodes: No gross adenopathy in neck.   Skin: no spider angiomata, jaundice, palmar erythema   Respiratory: clear to auscultation bilaterally  Cardiovascular: regular heart rate, no murmurs, no JVD, normal rate and regular rhythm  Abdomen: soft, non-tender, liver not enlarged, spleen not palpable, no obvious ascites  Extremities: no muscle wasting, no gross arthritic changes  Neurologic: alert and oriented, cranial nerves grossly intact, no asterixis    Labs:   Recent Results (from the past 24 hour(s))   GLUCOSE, POC    Collection Time: 06/26/17  9:51 AM   Result Value Ref Range    Glucose (POC) 108 70 - 110 mg/dL   CBC W/O DIFF    Collection Time: 06/26/17 10:15 AM   Result Value Ref Range    WBC 3.2 (L) 4.6 - 13.2 K/uL    RBC 3.97 (L) 4.20 - 5.30 M/uL    HGB 11.4 (L) 12.0 - 16.0 g/dL    HCT 35.2 35.0 - 45.0 %    MCV 88.7 74.0 - 97.0 FL    MCH 28.7 24.0 - 34.0 PG    MCHC 32.4 31.0 - 37.0 g/dL    RDW 13.9 11.6 - 14.5 %    PLATELET PENDING K/uL    MPV PENDING FL         Imp/ Plan: Will proceed with egd as planned. Risk benefits alternative including but not limited to infection, bleeding, perforation of viscous, allergic reaction and resultant morbidity and mortality was discussed. Chance of missing a significant lesion due to various reasons were discussed.       Susanne Champgane MD  [facility], [place] [address]

## 2017-06-26 NOTE — IP AVS SNAPSHOT
Ela Raphael 
 
 
 [address] [facility] 61 [facility]ly [facility] Patient: Daniel Lange MRN: [id number] :1945 You are allergic to the following Allergen Reactions Augmentin (Amoxicillin-Pot Clavulanate) Other (comments) Mouth sores Cefepime Rash Arm rashes, chest rashes Ciprofloxacin Rash Other reaction(s): mild rash/itching Only w/ IV Cipro, takes PO Cipro without problem Recent Documentation Height Weight Breastfeeding? BMI OB Status Smoking Status 1.753 m 80.3 kg No 26.14 kg/m2 Postmenopausal Never Smoker Emergency Contacts Name Discharge Info Relation Home Work Mobile Keysha CAREGIVER [3] Son [22] [phone] [place]  DISCHARGE CAREGIVER [3] Daughter [21]   [phone] [address] CAREGIVER [3] Son [22] [phone] About your hospitalization You were admitted on:  2017 You last received care in the:  [facility] PHASE 2 RECOVERY You were discharged on:  2017 Unit phone number:  [phone] Why you were hospitalized Your primary diagnosis was:  Not on File Providers Seen During Your Hospitalizations Provider Role Specialty Primary office phone Cephas Apley, MD Attending Provider Gastroenterology [phone] Your Primary Care Physician (PCP) Primary Care Physician Office Phone Office Fax Katalina  [phone] Follow-up Information Follow up With Details Comments Contact Info David Wells MD   [address] [place] [address] [address] [facility] [address] 
[phone] Cephas Apley, MD  Follow up within 8 to 12 weeks. [address] [place] [address] Drive [address] [facility]  [address] [facility] [address] 
[phone] Current Discharge Medication List  
  
CONTINUE these medications which have CHANGED Dose & Instructions Dispensing Information Comments Morning Noon Evening Bedtime  
 ascorbic acid (vitamin C) 250 mg tablet Commonly known as:  VITAMIN C What changed:   
- how much to take - when to take this Your last dose was: Your next dose is:    
   
   
 Dose:  250 mg Take 1 Tab by mouth two (2) times daily (with meals). Quantity:  30 Tab Refills:  0 CONTINUE these medications which have NOT CHANGED Dose & Instructions Dispensing Information Comments Morning Noon Evening Bedtime  
 acetaminophen 325 mg tablet Commonly known as:  TYLENOL Your last dose was: Your next dose is:    
   
   
 Dose:  650 mg Take 2 Tabs by mouth every four (4) hours as needed for Pain or Fever. Quantity:  15 Tab Refills:  0  
     
   
   
   
  
 aspirin delayed-release 81 mg tablet Your last dose was: Your next dose is:    
   
   
 Dose:  81 mg Take 1 Tab by mouth daily. Quantity:  30 Tab Refills:  5 Biotin 2,500 mcg Cap Your last dose was: Your next dose is: Take  by mouth daily. Refills:  0  
     
   
   
   
  
 cholecalciferol 1,000 unit tablet Commonly known as:  VITAMIN D3 Your last dose was: Your next dose is:    
   
   
 Dose:  2000 Units Take 2 Tabs by mouth daily. Quantity:  30 Tab Refills:  0  
     
   
   
   
  
 ferrous sulfate 325 mg (65 mg iron) tablet Your last dose was: Your next dose is:    
   
   
 Dose:  325 mg Take 1 Tab by mouth two (2) times daily (with meals). Quantity:  30 Tab Refills:  0  
     
   
   
   
  
 potassium chloride 20 mEq tablet Commonly known as:  K-DUR, KLOR-CON Your last dose was: Your next dose is:    
   
   
 Dose:  20 mEq Take 20 mEq by mouth daily. Refills:  0  
     
   
   
   
  
 simvastatin 40 mg tablet Commonly known as:  ZOCOR  
   
 Your last dose was: Your next dose is:    
   
   
 Dose:  20 mg Take 20 mg by mouth nightly. Refills:  0  
     
   
   
   
  
 TRAVATAN Z 0.004 % ophthalmic solution Generic drug:  travoprost  
   
Your last dose was: Your next dose is:    
   
   
 Dose:  1 Drop Administer 1 Drop to both eyes every evening. Refills:  0  
     
   
   
   
  
 VITAMIN D2 PO Your last dose was: Your next dose is: Take  by mouth every seven (7) days. Refills:  0  
     
   
   
   
  
 white petrolatum-mineral oil 56.8-42.5 % ointment Commonly known as:  LACRILUBE S.O.P. Your last dose was: Your next dose is:    
   
   
 Dose:  1 g  
Administer 1 g to right eye three (3) times daily. Quantity:  3.5 g Refills:  1 Discharge Instructions Upper GI Endoscopy: What to Expect at [facility] Your Recovery After you have an endoscopy, you will stay at the hospital or clinic for 1 to 2 hours. This will allow the medicine to wear off. You will be able to go home after your doctor or nurse checks to make sure you are not having any problems. You may have to stay overnight if you had treatment during the test. You may have a sore throat for a day or two after the test. 
This care sheet gives you a general idea about what to expect after the test. 
How can you care for yourself at home? Activity · Rest as much as you need to after you go home. · You should be able to go back to your usual activities the day after the test. 
Diet · Follow your doctor's directions for eating after the test. 
· Drink plenty of fluids (unless your doctor has told you not to). Medications · If you have a sore throat the day after the test, use an over-the-counter spray to numb your throat. Follow-up care is a key part of your treatment and safety.  Be sure to make and go to all appointments, and call your doctor if you are having problems. It's also a good idea to know your test results and keep a list of the medicines you take. When should you call for help? Call 911 anytime you think you may need emergency care. For example, call if: 
· You passed out (lost consciousness). · You cough up blood. · You vomit blood or what looks like coffee grounds. · You pass maroon or very bloody stools. Call your doctor now or seek immediate medical care if: 
· You have trouble swallowing. · You have belly pain. · Your stools are black and tarlike or have streaks of blood. · You are sick to your stomach or cannot keep fluids down. Watch closely for changes in your health, and be sure to contact your doctor if: 
· Your throat still hurts after a day or two. · You do not get better as expected. Where can you learn more? Go to http://sybil-layo.info/. Enter [phone] in the search box to learn more about \"Upper GI Endoscopy: What to Expect at Home. \" Current as of: August 9, 2016 Content Version: 11.3 © 2260-7038 [employer]. Care instructions adapted under license by [employer] (which disclaims liability or warranty for this information). If you have questions about a medical condition or this instruction, always ask your healthcare professional. [place] [address] any warranty or liability for your use of this information. DISCHARGE SUMMARY from Nurse The following personal items are in your possession at time of discharge: 
 
Dental Appliances: Partials (in personal property) Visual Aid: Glasses PATIENT INSTRUCTIONS: 
 
 
F-face looks uneven A-arms unable to move or move unevenly S-speech slurred or non-existent T-time-call 911 as soon as signs and symptoms begin-DO NOT go Back to bed or wait to see if you get better-TIME IS BRAIN. Warning Signs of HEART ATTACK Call 911 if you have these symptoms: 
? Chest discomfort. Most heart attacks involve discomfort in the center of the chest that lasts more than a few minutes, or that goes away and comes back. It can feel like uncomfortable pressure, squeezing, fullness, or pain. ? Discomfort in other areas of the upper body. Symptoms can include pain or discomfort in one or both arms, the back, neck, jaw, or stomach. ? Shortness of breath with or without chest discomfort. ? Other signs may include breaking out in a cold sweat, nausea, or lightheadedness. Don't wait more than five minutes to call [address]! Fast action can save your life. Calling 911 is almost always the fastest way to get lifesaving treatment. Emergency Medical Services staff can begin treatment when they arrive  up to an hour sooner than if someone gets to the hospital by car. The discharge information has been reviewed with the {PATIENT PARENT GUARDIAN:47314}. The {PATIENT PARENT GUARDIAN:38097} verbalized understanding. Discharge medications reviewed with the {Dishcarge meds reviewed ZVXY:99542} and appropriate educational materials and side effects teaching were provided. Esophageal Varices: Care Instructions Your Care Instructions Esophageal varices (say \"ee-sof-uh-ILSA-ul OVPR-ao-vcki\") are veins in your esophagus that are bigger than normal. Your esophagus is a tube. It carries food from your throat to your stomach. These veins get big because of extra pressure. This pressure makes the walls of the veins weak. Then they can rupture and cause very serious bleeding. This problem is usually found in people who have serious liver disease.  
Treatments include medicines and procedures to help lower the pressure in the veins. Talk with your doctor about the best treatment for you. If you have bleeding from this problem, there is a risk that it will happen again. In this case, it's important to go back and follow up with your doctor. Follow-up care is a key part of your treatment and safety. Be sure to make and go to all appointments, and call your doctor if you are having problems. It's also a good idea to know your test results and keep a list of the medicines you take. How can you care for yourself at home? · Be safe with medicines. Take your medicines exactly as prescribed. Call your doctor if you think you are having a problem with your medicine. You will get more details on the specific medicines your doctor prescribes. · Talk to your doctor before you take any other medicines. These include over-the-counter medicines, vitamins, and herbal products. · Avoid aspirin, ibuprofen (Advil, Motrin), and naproxen (Aleve). These can cause sores in your stomach or esophagus. · Do not drink alcohol. It increases your risk of bleeding. It can also make liver damage worse. Tell your doctor if you need help to quit. Counseling, support groups, and sometimes medicines can help you stay sober. When should you call for help? Call 911 anytime you think you may need emergency care. For example, call if: 
· You vomit blood or what looks like coffee grounds. · Your stools are maroon or very bloody. · You passed out (lost consciousness). · You feel very sleepy. Call your doctor now or seek immediate medical care if: 
· You are dizzy or lightheaded, or you feel like you may faint. · Your stools are black and look like tar, or they have streaks of blood. · You have trouble breathing. Watch closely for changes in your health, and be sure to contact your doctor if you have any problems. Where can you learn more? Go to http://sybil-layo.info/. Enter D853 in the search box to learn more about \"Esophageal Varices: Care Instructions. \" Current as of: August 9, 2016 Content Version: 11.3 © 8782-5511 [facility]orporated. Care instructions adapted under license by [employer] (which disclaims liability or warranty for this information). If you have questions about a medical condition or this instruction, always ask your healthcare professional. [address] any warranty or liability for your use of this information. Discharge Orders None [facility]! Dear Janelle Oh: 
Thank you for requesting a Domino account. Our records indicate that you have previously registered for a Domino account but its currently inactive. Please call our Domino support line at [phone]. Additional Information If you have questions, please visit the Frequently Asked Questions section of the Domino website at [email or web address] Remember, Domino is NOT to be used for urgent needs. For medical emergencies, dial 911. Now available from your iPhone and Android! General Information Please provide this summary of care documentation to your next provider. Patient Signature:  ____________________________________________________________ Date:  ____________________________________________________________  
  
Iveth Fischer Provider Signature:  ____________________________________________________________ Date:  ____________________________________________________________

## 2017-06-26 NOTE — PROCEDURES
[place]  [facility], [address]      Brief Procedure Note    Dilcia Mckoy  [birth year]  [id number]    Date of Procedure: 6/26/2017    Preoperative diagnosis: Esophageal and gastric varices (HCC) [I85.00, I86.4]  Esophagogastric ulcer, unspecified ulcer chronicity [K25.9]    Postoperative diagnosis:  small esophageal varices, portal hypertensive gastropathy, gastritis, gastric antrum biopsy    Type of Anesthesia: MAC (monitered anesthesia care)    Description of Findings: same as post op dx    Procedure: Procedure(s):  ENDOSCOPY with biopsy    :  Dr. David Parish MD    Assistant(s): [unfilled]    Type of Anesthesia:MAC     EBL:None    Specimens:   ID Type Source Tests Collected by Time Destination   1 : gastric antrum biopsy Preservative Gastric  David Parish MD 6/26/2017 1048 Pathology       Findings: See printed and scanned procedure note    Complications: None    Dr. David Parish MD  6/26/2017  10:59 AM

## 2017-06-26 NOTE — ANESTHESIA PREPROCEDURE EVALUATION
Anesthetic History   No history of anesthetic complications            Review of Systems / Medical History  Patient summary reviewed and pertinent labs reviewed    Pulmonary  Within defined limits                 Neuro/Psych   Within defined limits           Cardiovascular    Hypertension              Exercise tolerance: >4 METS  Comments: \"Has lost a lot of weight in 2015 as I was sick\"   GI/Hepatic/Renal           Liver disease (non alcoholic Cirhossis)     Endo/Other    Diabetes (diet controlled)         Other Findings   Comments:   Risk Factors for Postoperative nausea/vomiting:       History of postoperative nausea/vomiting? NO       Female? YES       Motion sickness? NO       Intended opioid administration for postoperative analgesia? NO      Smoking Abstinence  Current Smoker? NO  Elective Surgery? YES  Seen preoperatively by anesthesiologist or proxy prior to day of surgery? YES  Pt abstained from smoking 24 hours prior to anesthesia?  N/A           Physical Exam    Airway  Mallampati: II  TM Distance: 4 - 6 cm  Neck ROM: normal range of motion   Mouth opening: Normal     Cardiovascular  Regular rate and rhythm,  S1 and S2 normal,  no murmur, click, rub, or gallop             Dental    Dentition: Full upper dentures and Lower partial plate     Pulmonary  Breath sounds clear to auscultation               Abdominal  GI exam deferred       Other Findings            Anesthetic Plan    ASA: 2  Anesthesia type: MAC          Induction: Intravenous  Anesthetic plan and risks discussed with: Patient

## 2017-06-26 NOTE — DISCHARGE INSTRUCTIONS
Upper GI Endoscopy: What to Expect at [address] [place] [address]  After you have an endoscopy, you will stay at the hospital or clinic for 1 to 2 hours. This will allow the medicine to wear off. You will be able to go home after your doctor or nurse checks to make sure you are not having any problems. You may have to stay overnight if you had treatment during the test. You may have a sore throat for a day or two after the test.  This care sheet gives you a general idea about what to expect after the test.  How can you care for yourself at home? Activity  · Rest as much as you need to after you go home. · You should be able to go back to your usual activities the day after the test.  Diet  · Follow your doctor's directions for eating after the test.  · Drink plenty of fluids (unless your doctor has told you not to). Medications  · If you have a sore throat the day after the test, use an over-the-counter spray to numb your throat. Follow-up care is a key part of your treatment and safety. Be sure to make and go to all appointments, and call your doctor if you are having problems. It's also a good idea to know your test results and keep a list of the medicines you take. When should you call for help? Call 911 anytime you think you may need emergency care. For example, call if:  · You passed out (lost consciousness). · You cough up blood. · You vomit blood or what looks like coffee grounds. · You pass maroon or very bloody stools. Call your doctor now or seek immediate medical care if:  · You have trouble swallowing. · You have belly pain. · Your stools are black and tarlike or have streaks of blood. · You are sick to your stomach or cannot keep fluids down. Watch closely for changes in your health, and be sure to contact your doctor if:  · Your throat still hurts after a day or two. · You do not get better as expected. Where can you learn more? Go to http://sybil-layo.info/.   Enter V795 in the search box to learn more about \"Upper GI Endoscopy: What to Expect at Home. \"  Current as of: August 9, 2016  Content Version: 11.3  © 3977-0283 [employer]. Care instructions adapted under license by [employer] (which disclaims liability or warranty for this information). If you have questions about a medical condition or this instruction, always ask your healthcare professional. [address] any warranty or liability for your use of this information. DISCHARGE SUMMARY from Nurse    The following personal items are in your possession at time of discharge:    Dental Appliances: Partials (in personal property)  Visual Aid: Glasses                            PATIENT INSTRUCTIONS:    After general anesthesia or intravenous sedation, for 24 hours or while taking prescription Narcotics:  · Limit your activities  · Do not drive and operate hazardous machinery  · Do not make important personal or business decisions  · Do  not drink alcoholic beverages  · If you have not urinated within 8 hours after discharge, please contact your surgeon on call. Report the following to your surgeon:  · Excessive pain, swelling, redness or odor of or around the surgical area  · Temperature over 100.5  · Nausea and vomiting lasting longer than 4 hours or if unable to take medications  · Any signs of decreased circulation or nerve impairment to extremity: change in color, persistent  numbness, tingling, coldness or increase pain  · Any questions    *  Please give a list of your current medications to your Primary Care Provider. *  Please update this list whenever your medications are discontinued, doses are      changed, or new medications (including over-the-counter products) are added. *  Please carry medication information at all times in case of emergency situations.     These are general instructions for a healthy lifestyle:    No smoking/ No tobacco products/ Avoid exposure to second hand smoke    Surgeon General's Warning:  Quitting smoking now greatly reduces serious risk to your health. Obesity, smoking, and sedentary lifestyle greatly increases your risk for illness    A healthy diet, regular physical exercise & weight monitoring are important for maintaining a healthy lifestyle    You may be retaining fluid if you have a history of heart failure or if you experience any of the following symptoms:  Weight gain of 3 pounds or more overnight or 5 pounds in a week, increased swelling in our hands or feet or shortness of breath while lying flat in bed. Please call your doctor as soon as you notice any of these symptoms; do not wait until your next office visit. Recognize signs and symptoms of STROKE:    F-face looks uneven    A-arms unable to move or move unevenly    S-speech slurred or non-existent    T-time-call 911 as soon as signs and symptoms begin-DO NOT go       Back to bed or wait to see if you get better-TIME IS BRAIN. Warning Signs of HEART ATTACK     Call 911 if you have these symptoms:   Chest discomfort. Most heart attacks involve discomfort in the center of the chest that lasts more than a few minutes, or that goes away and comes back. It can feel like uncomfortable pressure, squeezing, fullness, or pain.  Discomfort in other areas of the upper body. Symptoms can include pain or discomfort in one or both arms, the back, neck, jaw, or stomach.  Shortness of breath with or without chest discomfort.  Other signs may include breaking out in a cold sweat, nausea, or lightheadedness. Don't wait more than five minutes to call 911 - MINUTES MATTER! Fast action can save your life. Calling 911 is almost always the fastest way to get lifesaving treatment. Emergency Medical Services staff can begin treatment when they arrive -- up to an hour sooner than if someone gets to the hospital by car. The discharge information has been reviewed with the patient.   The patient verbalized understanding. Discharge medications reviewed with the patient and appropriate educational materials and side effects teaching were provided. Esophageal Varices: Care Instructions  Your Care Instructions    Esophageal varices (say \"grace-sof-niurka-ILSA-merry ELIZABETHWIMA-nc-vjqc\") are veins in your esophagus that are bigger than normal. Your esophagus is a tube. It carries food from your throat to your stomach. These veins get big because of extra pressure. This pressure makes the walls of the veins weak. Then they can rupture and cause very serious bleeding. This problem is usually found in people who have serious liver disease. Treatments include medicines and procedures to help lower the pressure in the veins. Talk with your doctor about the best treatment for you. If you have bleeding from this problem, there is a risk that it will happen again. In this case, it's important to go back and follow up with your doctor. Follow-up care is a key part of your treatment and safety. Be sure to make and go to all appointments, and call your doctor if you are having problems. It's also a good idea to know your test results and keep a list of the medicines you take. How can you care for yourself at home? · Be safe with medicines. Take your medicines exactly as prescribed. Call your doctor if you think you are having a problem with your medicine. You will get more details on the specific medicines your doctor prescribes. · Talk to your doctor before you take any other medicines. These include over-the-counter medicines, vitamins, and herbal products. · Avoid aspirin, ibuprofen (Advil, Motrin), and naproxen (Aleve). These can cause sores in your stomach or esophagus. · Do not drink alcohol. It increases your risk of bleeding. It can also make liver damage worse. Tell your doctor if you need help to quit. Counseling, support groups, and sometimes medicines can help you stay sober.   When should you call for help?  Call 911 anytime you think you may need emergency care. For example, call if:  · You vomit blood or what looks like coffee grounds. · Your stools are maroon or very bloody. · You passed out (lost consciousness). · You feel very sleepy. Call your doctor now or seek immediate medical care if:  · You are dizzy or lightheaded, or you feel like you may faint. · Your stools are black and look like tar, or they have streaks of blood. · You have trouble breathing. Watch closely for changes in your health, and be sure to contact your doctor if you have any problems. Where can you learn more? Go to http://sybil-layo.info/. Enter J823 in the search box to learn more about \"Esophageal Varices: Care Instructions. \"  Current as of: August 9, 2016  Content Version: 11.3  © 7251-8707 [employer], [employer]. Care instructions adapted under license by [employer] (which disclaims liability or warranty for this information). If you have questions about a medical condition or this instruction, always ask your healthcare professional. [address] any warranty or liability for your use of this information.

## 2017-09-25 ENCOUNTER — HOSPITAL ENCOUNTER (OUTPATIENT)
Dept: ULTRASOUND IMAGING | Age: 72
Discharge: HOME | End: 2017-09-25
Attending: INTERNAL MEDICINE
Payer: MEDICARE

## 2017-09-25 DIAGNOSIS — K74.60 CIRRHOSIS (HCC): ICD-10-CM

## 2017-09-25 PROCEDURE — 76705 ECHO EXAM OF ABDOMEN: CPT

## 2018-03-21 ENCOUNTER — HOSPITAL ENCOUNTER (OUTPATIENT)
Dept: MAMMOGRAPHY | Age: 73
Discharge: HOME | End: 2018-03-21
Attending: FAMILY MEDICINE
Payer: MEDICARE

## 2018-03-21 DIAGNOSIS — Z12.31 VISIT FOR SCREENING MAMMOGRAM: ICD-10-CM

## 2018-03-21 PROCEDURE — 77067 SCR MAMMO BI INCL CAD: CPT

## 2018-06-05 ENCOUNTER — HOSPITAL ENCOUNTER (OUTPATIENT)
Dept: ULTRASOUND IMAGING | Age: 73
Discharge: HOME | End: 2018-06-05
Attending: INTERNAL MEDICINE
Payer: MEDICARE

## 2018-06-05 DIAGNOSIS — I85.00 ESOPHAGEAL VARICES WITHOUT BLEEDING (HCC): ICD-10-CM

## 2018-06-05 PROCEDURE — 76705 ECHO EXAM OF ABDOMEN: CPT

## 2018-11-28 ENCOUNTER — HOSPITAL ENCOUNTER (OUTPATIENT)
Dept: ULTRASOUND IMAGING | Age: 73
Discharge: HOME | End: 2018-11-28
Attending: INTERNAL MEDICINE
Payer: MEDICARE

## 2018-11-28 DIAGNOSIS — I85.00 ESOPHAGEAL VARICES WITHOUT BLEEDING (HCC): ICD-10-CM

## 2018-11-28 PROCEDURE — 76705 ECHO EXAM OF ABDOMEN: CPT

## 2019-04-10 ENCOUNTER — HOSPITAL ENCOUNTER (OUTPATIENT)
Dept: MAMMOGRAPHY | Age: 74
Discharge: HOME | End: 2019-04-10
Attending: FAMILY MEDICINE
Payer: MEDICARE

## 2019-04-10 DIAGNOSIS — Z12.31 VISIT FOR SCREENING MAMMOGRAM: ICD-10-CM

## 2019-04-10 PROCEDURE — 77067 SCR MAMMO BI INCL CAD: CPT

## 2019-07-29 ENCOUNTER — HOSPITAL ENCOUNTER (OUTPATIENT)
Dept: NON INVASIVE DIAGNOSTICS | Age: 74
Discharge: HOME | End: 2019-07-29
Attending: FAMILY MEDICINE
Payer: MEDICARE

## 2019-07-29 VITALS
DIASTOLIC BLOOD PRESSURE: 60 MMHG | BODY MASS INDEX: 26.22 KG/M2 | HEIGHT: 69 IN | SYSTOLIC BLOOD PRESSURE: 126 MMHG | WEIGHT: 177 LBS

## 2019-07-29 DIAGNOSIS — R01.1 CARDIAC MURMUR: ICD-10-CM

## 2019-07-29 PROCEDURE — 93306 TTE W/DOPPLER COMPLETE: CPT

## 2019-07-30 LAB
ECHO AO ROOT DIAM: 2.61 CM
ECHO AV AREA PEAK VELOCITY: 1.6 CM2
ECHO AV AREA VTI: 1.5 CM2
ECHO AV AREA/BSA PEAK VELOCITY: 0.8 CM2/M2
ECHO AV AREA/BSA VTI: 0.8 CM2/M2
ECHO AV MEAN GRADIENT: 13 MMHG
ECHO AV PEAK GRADIENT: 23.3 MMHG
ECHO AV PEAK VELOCITY: 241.48 CM/S
ECHO AV VTI: 57.85 CM
ECHO LA AREA 4C: 19.2 CM2
ECHO LA VOL 2C: 43.8 ML (ref 22–52)
ECHO LA VOL 4C: 48.89 ML (ref 22–52)
ECHO LA VOL BP: 53.38 ML (ref 22–52)
ECHO LA VOL/BSA BIPLANE: 27.22 ML/M2 (ref 16–28)
ECHO LA VOLUME INDEX A2C: 22.33 ML/M2 (ref 16–28)
ECHO LA VOLUME INDEX A4C: 24.93 ML/M2 (ref 16–28)
ECHO LV E' LATERAL VELOCITY: 8.92 CM/S
ECHO LV E' SEPTAL VELOCITY: 7.92 CM/S
ECHO LV EDV A2C: 77.4 ML
ECHO LV EDV A4C: 69 ML
ECHO LV EDV BP: 76.8 ML (ref 56–104)
ECHO LV EDV INDEX A4C: 35.2 ML/M2
ECHO LV EDV INDEX BP: 39.2 ML/M2
ECHO LV EDV NDEX A2C: 39.5 ML/M2
ECHO LV EJECTION FRACTION A2C: 65 %
ECHO LV EJECTION FRACTION A4C: 64 %
ECHO LV EJECTION FRACTION BIPLANE: 63.1 % (ref 55–100)
ECHO LV ESV A2C: 27.3 ML
ECHO LV ESV A4C: 25.2 ML
ECHO LV ESV BP: 28.4 ML (ref 19–49)
ECHO LV ESV INDEX A2C: 13.9 ML/M2
ECHO LV ESV INDEX A4C: 12.8 ML/M2
ECHO LV ESV INDEX BP: 14.5 ML/M2
ECHO LV INTERNAL DIMENSION DIASTOLIC: 4.51 CM (ref 3.9–5.3)
ECHO LV INTERNAL DIMENSION SYSTOLIC: 3.18 CM
ECHO LV IVSD: 1.22 CM (ref 0.6–0.9)
ECHO LV MASS 2D: 243.5 G (ref 67–162)
ECHO LV MASS INDEX 2D: 124.2 G/M2 (ref 43–95)
ECHO LV POSTERIOR WALL DIASTOLIC: 1.24 CM (ref 0.6–0.9)
ECHO LVOT DIAM: 2.2 CM
ECHO LVOT PEAK GRADIENT: 4.2 MMHG
ECHO LVOT PEAK VELOCITY: 102 CM/S
ECHO LVOT VTI: 23.47 CM
ECHO MV A VELOCITY: 91.25 CM/S
ECHO MV E DECELERATION TIME (DT): 189.9 MS
ECHO MV E VELOCITY: 93.84 CM/S
ECHO MV E/A RATIO: 1.03
ECHO MV E/E' LATERAL: 10.52
ECHO MV E/E' RATIO (AVERAGED): 11.18
ECHO MV E/E' SEPTAL: 11.85
ECHO RV TAPSE: 2.76 CM (ref 1.5–2)

## 2019-09-18 ENCOUNTER — HOSPITAL ENCOUNTER (OUTPATIENT)
Dept: ULTRASOUND IMAGING | Age: 74
Discharge: HOME | End: 2019-09-18
Attending: INTERNAL MEDICINE
Payer: MEDICARE

## 2019-09-18 DIAGNOSIS — D69.59 THROMBOCYTOPENIA DUE TO DIMINISHED PLATELET PRODUCTION: ICD-10-CM

## 2019-09-18 DIAGNOSIS — K74.60 NON-ALCOHOLIC CIRRHOSIS (HCC): ICD-10-CM

## 2019-09-18 DIAGNOSIS — I85.00 ESOPHAGEAL VARICES WITHOUT BLEEDING (HCC): ICD-10-CM

## 2019-09-18 PROCEDURE — 76705 ECHO EXAM OF ABDOMEN: CPT

## 2019-10-02 RX ORDER — LATANOPROST 50 UG/ML
1 SOLUTION/ DROPS OPHTHALMIC
COMMUNITY

## 2019-10-02 RX ORDER — BRIMONIDINE TARTRATE, TIMOLOL MALEATE 2; 5 MG/ML; MG/ML
1 SOLUTION/ DROPS OPHTHALMIC EVERY 12 HOURS
COMMUNITY
End: 2022-11-02

## 2019-10-02 RX ORDER — SIMVASTATIN 20 MG/1
20 TABLET, FILM COATED ORAL
COMMUNITY

## 2019-10-14 ENCOUNTER — ANESTHESIA EVENT (OUTPATIENT)
Dept: ENDOSCOPY | Age: 74
End: 2019-10-14
Payer: MEDICARE

## 2019-10-15 ENCOUNTER — HOSPITAL ENCOUNTER (OUTPATIENT)
Age: 74
Setting detail: OUTPATIENT SURGERY
Discharge: HOME | End: 2019-10-15
Attending: INTERNAL MEDICINE | Admitting: INTERNAL MEDICINE
Payer: MEDICARE

## 2019-10-15 ENCOUNTER — ANESTHESIA (OUTPATIENT)
Dept: ENDOSCOPY | Age: 74
End: 2019-10-15
Payer: MEDICARE

## 2019-10-15 VITALS
DIASTOLIC BLOOD PRESSURE: 60 MMHG | HEIGHT: 69 IN | WEIGHT: 180 LBS | BODY MASS INDEX: 26.66 KG/M2 | RESPIRATION RATE: 22 BRPM | OXYGEN SATURATION: 98 % | TEMPERATURE: 98.5 F | SYSTOLIC BLOOD PRESSURE: 144 MMHG | HEART RATE: 66 BPM

## 2019-10-15 PROCEDURE — 76040000019: Performed by: INTERNAL MEDICINE

## 2019-10-15 PROCEDURE — 76060000032 HC ANESTHESIA 0.5 TO 1 HR: Performed by: INTERNAL MEDICINE

## 2019-10-15 PROCEDURE — 77030013805 HC LIGTR VRCES BSC -B: Performed by: INTERNAL MEDICINE

## 2019-10-15 PROCEDURE — 74011250636 HC RX REV CODE- 250/636: Performed by: NURSE ANESTHETIST, CERTIFIED REGISTERED

## 2019-10-15 PROCEDURE — 74011250636 HC RX REV CODE- 250/636

## 2019-10-15 PROCEDURE — 74011000250 HC RX REV CODE- 250

## 2019-10-15 RX ORDER — ATROPINE SULFATE 0.1 MG/ML
0.5 INJECTION INTRAVENOUS
Status: DISCONTINUED | OUTPATIENT
Start: 2019-10-15 | End: 2019-10-15 | Stop reason: HOSPADM

## 2019-10-15 RX ORDER — SODIUM CHLORIDE 0.9 % (FLUSH) 0.9 %
5-40 SYRINGE (ML) INJECTION EVERY 8 HOURS
Status: DISCONTINUED | OUTPATIENT
Start: 2019-10-15 | End: 2019-10-15 | Stop reason: HOSPADM

## 2019-10-15 RX ORDER — DEXTROMETHORPHAN/PSEUDOEPHED 2.5-7.5/.8
1.2 DROPS ORAL
Status: DISCONTINUED | OUTPATIENT
Start: 2019-10-15 | End: 2019-10-15 | Stop reason: HOSPADM

## 2019-10-15 RX ORDER — PROPOFOL 10 MG/ML
INJECTION, EMULSION INTRAVENOUS AS NEEDED
Status: DISCONTINUED | OUTPATIENT
Start: 2019-10-15 | End: 2019-10-15 | Stop reason: HOSPADM

## 2019-10-15 RX ORDER — SODIUM CHLORIDE 0.9 % (FLUSH) 0.9 %
5-40 SYRINGE (ML) INJECTION AS NEEDED
Status: DISCONTINUED | OUTPATIENT
Start: 2019-10-15 | End: 2019-10-15 | Stop reason: HOSPADM

## 2019-10-15 RX ORDER — LIDOCAINE HYDROCHLORIDE 20 MG/ML
INJECTION, SOLUTION EPIDURAL; INFILTRATION; INTRACAUDAL; PERINEURAL AS NEEDED
Status: DISCONTINUED | OUTPATIENT
Start: 2019-10-15 | End: 2019-10-15 | Stop reason: HOSPADM

## 2019-10-15 RX ORDER — FLUMAZENIL 0.1 MG/ML
0.2 INJECTION INTRAVENOUS
Status: DISCONTINUED | OUTPATIENT
Start: 2019-10-15 | End: 2019-10-15 | Stop reason: HOSPADM

## 2019-10-15 RX ORDER — EPINEPHRINE 0.1 MG/ML
1 INJECTION INTRACARDIAC; INTRAVENOUS
Status: DISCONTINUED | OUTPATIENT
Start: 2019-10-15 | End: 2019-10-15 | Stop reason: HOSPADM

## 2019-10-15 RX ORDER — NALOXONE HYDROCHLORIDE 0.4 MG/ML
0.4 INJECTION, SOLUTION INTRAMUSCULAR; INTRAVENOUS; SUBCUTANEOUS
Status: DISCONTINUED | OUTPATIENT
Start: 2019-10-15 | End: 2019-10-15 | Stop reason: HOSPADM

## 2019-10-15 RX ORDER — SODIUM CHLORIDE, SODIUM LACTATE, POTASSIUM CHLORIDE, CALCIUM CHLORIDE 600; 310; 30; 20 MG/100ML; MG/100ML; MG/100ML; MG/100ML
75 INJECTION, SOLUTION INTRAVENOUS CONTINUOUS
Status: DISCONTINUED | OUTPATIENT
Start: 2019-10-15 | End: 2019-10-15 | Stop reason: HOSPADM

## 2019-10-15 RX ADMIN — LIDOCAINE HYDROCHLORIDE 80 MG: 20 INJECTION, SOLUTION EPIDURAL; INFILTRATION; INTRACAUDAL; PERINEURAL at 11:43

## 2019-10-15 RX ADMIN — PROPOFOL 10 MG: 10 INJECTION, EMULSION INTRAVENOUS at 11:43

## 2019-10-15 RX ADMIN — SODIUM CHLORIDE, SODIUM LACTATE, POTASSIUM CHLORIDE, AND CALCIUM CHLORIDE 75 ML/HR: 600; 310; 30; 20 INJECTION, SOLUTION INTRAVENOUS at 11:21

## 2019-10-15 RX ADMIN — PROPOFOL 10 MG: 10 INJECTION, EMULSION INTRAVENOUS at 11:50

## 2019-10-15 RX ADMIN — PROPOFOL 20 MG: 10 INJECTION, EMULSION INTRAVENOUS at 11:46

## 2019-10-15 RX ADMIN — FAMOTIDINE 20 MG: 10 INJECTION INTRAVENOUS at 11:22

## 2019-10-15 RX ADMIN — PROPOFOL 20 MG: 10 INJECTION, EMULSION INTRAVENOUS at 11:47

## 2019-10-15 RX ADMIN — PROPOFOL 60 MG: 10 INJECTION, EMULSION INTRAVENOUS at 11:44

## 2019-10-15 RX ADMIN — SODIUM CHLORIDE, SODIUM LACTATE, POTASSIUM CHLORIDE, AND CALCIUM CHLORIDE: 600; 310; 30; 20 INJECTION, SOLUTION INTRAVENOUS at 11:40

## 2019-10-15 RX ADMIN — PROPOFOL 20 MG: 10 INJECTION, EMULSION INTRAVENOUS at 11:49

## 2019-10-15 NOTE — ANESTHESIA PREPROCEDURE EVALUATION
Relevant Problems   No relevant active problems       Anesthetic History   No history of anesthetic complications            Review of Systems / Medical History  Patient summary reviewed, nursing notes reviewed and pertinent labs reviewed    Pulmonary  Within defined limits                 Neuro/Psych   Within defined limits           Cardiovascular    Hypertension: well controlled          Hyperlipidemia    Exercise tolerance: >4 METS     GI/Hepatic/Renal           Liver disease    Comments: [facility]/Other    Diabetes: well controlled         Other Findings            Physical Exam    Airway  Mallampati: III  TM Distance: 4 - 6 cm  Neck ROM: normal range of motion   Mouth opening: Normal     Cardiovascular  Regular rate and rhythm,  S1 and S2 normal,  no murmur, click, rub, or gallop  Rhythm: regular  Rate: normal         Dental    Dentition: Caps/crowns and Full upper dentures     Pulmonary  Breath sounds clear to auscultation               Abdominal  GI exam deferred       Other Findings            Anesthetic Plan    ASA: 2  Anesthesia type: MAC          Induction: Intravenous  Anesthetic plan and risks discussed with: Patient

## 2019-10-15 NOTE — DISCHARGE INSTRUCTIONS
Upper GI Endoscopy: What to Expect at [address] [place] [address]  After you have an endoscopy, you will stay at the hospital or clinic for 1 to 2 hours. This will allow the medicine to wear off. You will be able to go home after your doctor or nurse checks to make sure you are not having any problems. You may have to stay overnight if you had treatment during the test. You may have a sore throat for a day or two after the test.  This care sheet gives you a general idea about what to expect after the test.  How can you care for yourself at home? Activity  · Rest as much as you need to after you go home. · You should be able to go back to your usual activities the day after the test.  Diet  · Follow your doctor's directions for eating after the test.  · Drink plenty of fluids (unless your doctor has told you not to). Medications  · If you have a sore throat the day after the test, use an over-the-counter spray to numb your throat. Follow-up care is a key part of your treatment and safety. Be sure to make and go to all appointments, and call your doctor if you are having problems. It's also a good idea to know your test results and keep a list of the medicines you take. When should you call for help? Call 911 anytime you think you may need emergency care. For example, call if:  · You passed out (lost consciousness). · You cough up blood. · You vomit blood or what looks like coffee grounds. · You pass maroon or very bloody stools. Call your doctor now or seek immediate medical care if:  · You have trouble swallowing. · You have belly pain. · Your stools are black and tarlike or have streaks of blood. · You are sick to your stomach or cannot keep fluids down. Watch closely for changes in your health, and be sure to contact your doctor if:  · Your throat still hurts after a day or two. · You do not get better as expected. Where can you learn more?    Go to DealExplorer.be  Enter J454 in the search box to learn more about \"Upper GI Endoscopy: What to Expect at Home. \"   © 9480-0153 [employer], [employer]. Care instructions adapted under license by [facility] [employer] (which disclaims liability or warranty for this information). This care instruction is for use with your licensed healthcare professional. If you have questions about a medical condition or this instruction, always ask your healthcare professional. [address] any warranty or liability for your use of this information. Content Version: 64.6.526616; Current as of: November 14, 2014    DISCHARGE SUMMARY from Nurse     POST-PROCEDURE INSTRUCTIONS:    Call your Physician if you:  ? Observe any excess bleeding. ? Develop a temperature over 100.5o F.  ? Experience abdominal, shoulder or chest pain. ? Notice any signs of decreased circulation or nerve impairment to an extremity such as a change in color, persistent numbness, tingling, coldness or increase in pain. ? Vomit blood or you have nausea and vomiting lasting longer than 4 hours. ? Are unable to take medications. ? Are unable to urinate within 8 hours after discharge following general anesthesia or intravenous sedation. For the next 24 hours after receiving general anesthesia or intravenous sedation, or while taking prescription Narcotics, limit your activities:  ? Do NOT drive a motor vehicle, operate hazard machinery or power tools, or perform tasks that require coordination. The medication you received during your procedure may have some effect on your mental awareness. ? Do NOT make important personal or business decisions. The medication you received during your procedure may have some effect on your mental awareness. ? Do NOT drink alcoholic beverages. These drinks do not mix well with the medications that have been given to you. ? Upon discharge from the hospital, you must be accompanied by a responsible adult. ?  Resume your diet as directed by your physician. ? Resume medications as your physician has prescribed. ? Please give a list of your current medications to your Primary Care Provider. ? Please update this list whenever your medications are discontinued, doses are changed, or new medications (including over-the-counter products) are added. ? Please carry medication information at all times in case of emergency situations. These are general instructions for a healthy lifestyle:    No smoking/ No tobacco products/ Avoid exposure to second hand smoke.  Surgeon General's Warning:  Quitting smoking now greatly reduces serious risk to your health. Obesity, smoking, and a sedentary lifestyle greatly increase your risk for illness.  A healthy diet, regular physical exercise & weight monitoring are important for maintaining a healthy lifestyle   You may be retaining fluid if you have a history of heart failure or if you experience any of the following symptoms:  Weight gain of 3 pounds or more overnight or 5 pounds in a week, increased swelling in our hands or feet or shortness of breath while lying flat in bed. Please call your doctor as soon as you notice any of these symptoms; do not wait until your next office visit. Recognize signs and symptoms of STROKE:  F  -  Face looks uneven  A  -  Arms unable to move or move unevenly  S  -  Speech slurred or non-existent  T  -  Time to call 911 - as soon as signs and symptoms begin - DO NOT go back to bed or wait to see If you get better - TIME IS BRAIN. Colorectal Screening   Colorectal cancer almost always develops from precancerous polyps (abnormal growths) in the colon or rectum. Screening tests can find precancerous polyps, so that they can be removed before they turn into cancer.  Screening tests can also find colorectal cancer early, when treatment works best.  Patria Faria Speak with your physician about when you should begin screening and how often you should be tested  Patria Faria   Additional Information    If you have questions, please call [phone]. Remember, MyChart is NOT to be used for urgent needs. For medical emergencies, dial 911. Educational references and/or instructions provided during this visit included:    Patient Education        Esophageal Varices: Care Instructions  Your Care Instructions    Esophageal varices (say \"ee-sof-uh-ILSA-ul DZUT-sz-kzcm\") are veins in your esophagus that are bigger than normal. Your esophagus is a tube. It carries food from your throat to your stomach. These veins get big because of extra pressure. This pressure makes the walls of the veins weak. Then they can rupture and cause very serious bleeding. This problem is usually found in people who have serious liver disease. Treatments include medicines and procedures to help lower the pressure in the veins. Talk with your doctor about the best treatment for you. If you have bleeding from this problem, there is a risk that it will happen again. In this case, it's important to go back and follow up with your doctor. Follow-up care is a key part of your treatment and safety. Be sure to make and go to all appointments, and call your doctor if you are having problems. It's also a good idea to know your test results and keep a list of the medicines you take. How can you care for yourself at home? · Be safe with medicines. Take your medicines exactly as prescribed. Call your doctor if you think you are having a problem with your medicine. You will get more details on the specific medicines your doctor prescribes. · Talk to your doctor before you take any other medicines. These include over-the-counter medicines, vitamins, and herbal products. · Avoid aspirin, ibuprofen (Advil, Motrin), and naproxen (Aleve). These can cause sores in your stomach or esophagus. · Do not drink alcohol. It increases your risk of bleeding. It can also make liver damage worse. Tell your doctor if you need help to quit. Counseling, support groups, and sometimes medicines can help you stay sober. When should you call for help? Call 911 anytime you think you may need emergency care. For example, call if:    · You have trouble breathing.     · You vomit blood or what looks like coffee grounds.    Call your doctor now or seek immediate medical care if:    · You feel very sleepy or confused.     · You have new or worse belly pain.     · You have a fever.     · There is a new or increasing yellow tint to your skin or the whites of your eyes.     · You have any abnormal bleeding, such as:  ? Nosebleeds. ? Vaginal bleeding that is different (heavier, more frequent, at a different time of the month) than what you are used to.  ? Bloody or black stools, or rectal bleeding. ? Bloody or pink urine.    Watch closely for changes in your health, and be sure to contact your doctor if:    · You have any problems.     · Your belly is getting bigger.     · You are gaining weight. Where can you learn more? Go to http://sybil-layo.info/. Enter U631 in the search box to learn more about \"Esophageal Varices: Care Instructions. \"  Current as of: November 7, 2018  Content Version: 12.2  © 2952-8852 [employer]. Care instructions adapted under license by [employer] (which disclaims liability or warranty for this information). If you have questions about a medical condition or this instruction, always ask your healthcare professional. [place] [address] any warranty or liability for your use of this information. Discharge information has been reviewed with the patient and spouse. The patient and spouse verbalized understanding.

## 2019-10-15 NOTE — PROCEDURES
[place]  [facility], [address]      Brief Procedure Note    Lissette Croft  [birth year]  [id number]    Date of Procedure: 10/15/2019    Preoperative diagnosis: 390.9,  Non-alcoholic cirrhosis  461.4 - I85.00,  Esophageal varices without bleeding  D69.59,  Secondary thrombocytopenia    Postoperative diagnosis:  grade 2 esophageal varices in 2 columns, placed 4 bands.  , mild gastritis     Type of Anesthesia: MAC (monitered anesthesia care)    Description of Findings: same as post op dx    Procedure: Procedure(s):  UPPER ENDOSCOPY / possible variceal banding    :  Dr. Nelson Li MD    Assistant(s): @[facility]@    Type of Anesthesia:MAC     EBL:None    Specimens: * No specimens in log *    Findings: See printed and scanned procedure note    Complications: None    Dr. Nelson Li MD  10/15/2019  11:53 AM

## 2019-10-15 NOTE — H&P
[facility]    [email or web address]      Impression: 1. Nonalcoholic cirrhois  2. Hx of Grade 1 varices. Plan:     1. EGD with MAC      Chief Complaint: Varices assessment      HPI:  Carol Srinivasan is a 68 y.o. female who is being seen preop for esophageal varices surveilance. She has nonalcoholic related cirrhosis. Last EGD showed grade 1 varices in .     PMH:   Past Medical History:   Diagnosis Date    Abscess of left kidney 2015    Acute deep vein thrombosis of right lower extremity ([address] 3/02/2015    Anticoagulated by anticoagulation treatment 3/06/2015    On Rivaroxaban    Bell's palsy 2017    no drooping    Candidal urinary tract infection 3/12/2015    Chronic anemia     Chronic diastolic heart failure ([address]     denies 10/2/19    Citrobacter infection 2/10/2015    Urinary tract infection    Decubitus ulcer of sacral region, stage 2 ([address]     Dyslipidemia     Glaucoma     History of hypertension     History of kidney stones 2012    Hypertension     resolved    Hypoalbuminemia     Liver disease     non alcoholic cirrhosis    Obesity, Class II, BMI 35-39.9     Pneumothorax on right 2015    Postoperative anemia due to acute blood loss     Sepsis ([address]     Thrombocytopenia ([address]     Dr. Kristine Rosales    Type 2 diabetes mellitus ([address]     diet controlled       PSH:   Past Surgical History:   Procedure Laterality Date    HX  SECTION      HX COLOSTOMY  2015    HX LITHOTRIPSY      5 times    HX OTHER SURGICAL  1977    excision nodule thyroid    HX OTHER SURGICAL  2015    S/P CT-guided cholecystostomy tube placement (2015 - Dr. Ron Alonso)    HX OTHER SURGICAL Left 2015    S/P CT-guided left lower renal pole abscess drainage and drainage catheter placement (2015 - Dr. Ron Alonso)    HX OTHER SURGICAL Left 2015    S/P Percutaneous drainage of abscess via anterior abdomen (2/17/2015 - Dr. Siobhan Dong)    HX OTHER SURGICAL Right 2/27/2015    S/P Right chest tube insertion (2/27/2015 - Dr. Siobhan Dong)    HX RENAL BIOPSY Left 2/16/2015    S/P CT-guided left lower renal pole biopsy (2/16/2015 - Dr. Magdalena Keita)       Social HX:   Social History     Socioeconomic History    Marital status:      Spouse name: Not on file    Number of children: Not on file    Years of education: Not on file    Highest education level: Not on file   Occupational History    Not on file   Social Needs    Financial resource strain: Not on file    Food insecurity:     Worry: Not on file     Inability: Not on file    Transportation needs:     Medical: Not on file     Non-medical: Not on file   Tobacco Use    Smoking status: Never Smoker    Smokeless tobacco: Never Used   Substance and Sexual Activity    Alcohol use: No    Drug use: No    Sexual activity: Never   Lifestyle    Physical activity:     Days per week: Not on file     Minutes per session: Not on file    Stress: Not on file   Relationships    Social connections:     Talks on phone: Not on file     Gets together: Not on file     Attends Denominational service: Not on file     Active member of club or organization: Not on file     Attends meetings of clubs or organizations: Not on file     Relationship status: Not on file    Intimate partner violence:     Fear of current or ex partner: Not on file     Emotionally abused: Not on file     Physically abused: Not on file     Forced sexual activity: Not on file   Other Topics Concern    Not on file   Social History Narrative    Not on file       FHX:   Family History   Problem Relation Age of Onset    Heart Attack Father     Heart Attack Brother        Allergy:   Allergies   Allergen Reactions    Augmentin [Amoxicillin-Pot Clavulanate] Other (comments)     Mouth sores    Cefepime Rash     Arm rashes, chest rashes    Ciprofloxacin Rash     Other reaction(s): mild rash/itching  Only w/ IV Cipro, takes PO Cipro without problem       Home Medications:     Medications Prior to Admission   Medication Sig    simvastatin (ZOCOR) 20 mg tablet Take 20 mg by mouth nightly.  latanoprost (XALATAN) 0.005 % ophthalmic solution Administer 1 Drop to both eyes nightly.  brimonidine-timolol (COMBIGAN) 0.2-0.5 % drop ophthalmic solution Administer 1 Drop to both eyes every twelve (12) hours.  ERGOCALCIFEROL, VITAMIN D2, (VITAMIN D2 PO) Take  by mouth every seven (7) days.  Biotin 2,500 mcg cap Take  by mouth daily.  potassium chloride (K-DUR, KLOR-CON) 20 mEq tablet Take 20 mEq by mouth daily.  ferrous sulfate 325 mg (65 mg iron) tablet Take 1 Tab by mouth two (2) times daily (with meals). (Patient taking differently: Take 325 mg by mouth Daily (before breakfast). )    ascorbic acid (VITAMIN C) 250 mg tablet Take 1 Tab by mouth two (2) times daily (with meals). (Patient taking differently: Take 250 mg by mouth daily.)       Review of Systems:     Constitutional: No fevers, chills, weight loss, fatigue. Cardiovascular: No chest pain, heart palpitations. Respiratory: No cough, SOB, wheezing, chest discomfort, orthopnea. Gastrointestinal: No GI or oabdominal complaints       Musculoskeletal: No weakness, arthralgias, wasting. Allergies: As noted. Visit Vitals  Ht 5' 9\" (1.753 m)   Wt 81.6 kg (180 lb)   BMI 26.58 kg/m²       Physical Assessment:     constitutional: appearance: well developed, well nourished, normal habitus, no deformities, in no acute distress. ENMT: mouth: normal oral mucosa,lips and gums; good dentition. oropharynx: normal tongue, hard and soft palate; posterior pharynx without erithema, exudate or lesions. respiratory: effort: normal chest excursion; no intercostal retraction or accessory muscle use. cardiovascular: abdominal aorta: normal size and position; no bruits.  palpation: PMI of normal size and position; normal rhythm; no thrill or murmurs. abdominal: abdomen: normal consistency; no tenderness or masses. hernias: no hernias appreciated. liver: normal size and consistency. spleen: not palpable. rectal: hemoccult/guaiac: not performed. musculoskeletal: digits and nails: no clubbing, cyanosis, petechiae or other inflammatory conditions. psychiatric: orientation: oriented to time, space and person. Demario Dougherty MD, M.D. [facility], [address] [place] [address]  Pager [phone]  [email or web address]

## 2019-10-15 NOTE — ANESTHESIA POSTPROCEDURE EVALUATION
Procedure(s):  UPPER ENDOSCOPY with   variceal rubber  banding. MAC    Anesthesia Post Evaluation      Multimodal analgesia: multimodal analgesia used between 6 hours prior to anesthesia start to PACU discharge  Patient location during evaluation: bedside  Patient participation: complete - patient participated  Level of consciousness: awake and alert  Pain score: 0  Airway patency: patent  Anesthetic complications: no  Cardiovascular status: acceptable  Respiratory status: acceptable  Hydration status: acceptable  Post anesthesia nausea and vomiting:  none      Vitals Value Taken Time   /58 10/15/2019 12:10 PM   Temp     Pulse 66 10/15/2019 12:20 PM   Resp 22 10/15/2019 12:20 PM   SpO2 98 % 10/15/2019 12:20 PM   Vitals shown include unvalidated device data.

## 2019-10-18 ENCOUNTER — OFFICE VISIT (OUTPATIENT)
Dept: CARDIOLOGY CLINIC | Age: 74
End: 2019-10-18

## 2019-10-18 VITALS
HEIGHT: 69 IN | SYSTOLIC BLOOD PRESSURE: 122 MMHG | HEART RATE: 81 BPM | DIASTOLIC BLOOD PRESSURE: 76 MMHG | OXYGEN SATURATION: 96 % | WEIGHT: 196 LBS | BODY MASS INDEX: 29.03 KG/M2

## 2019-10-18 DIAGNOSIS — R01.1 CARDIAC MURMUR: Primary | ICD-10-CM

## 2019-10-18 NOTE — PROGRESS NOTES
Polly Ruano    Aortic stenosis    CLOVIS Ruano is a 76 y.o. a pleasant female with no known heart disease here for evaluation of a heart murmur. As you know patient has had a lot of chronic medical problems and was hospitalized a couple years ago with several complications. He apparently did have episodes of fluid overload, hypertension, and diabetes-but is not on any chronic medications for these conditions and denies any recent problems. I do know that she follows with GI- she tells me she has MENCHACA and also recently had an EGD (for varices) and was asked to start Nadolol. She is afraid to take this and comes asking me if she should. I know that coincidentally several years ago a heart murmur was noticed when she was in preop. This led to screening echocardiograms that showed aortic sclerosis without any significant stenosis. More recently the quality of her murmur changed and she had another repeat echocardiogram that finally showed some mild aortic stenosis. Please see this report below. I independently obtained and reviewed this report with the patient today. She completely denies chest discomfort, no shortness of breath she has never passed out. She herself has never seen a cardiologist but she is retired from vein and [employer] and also says that several family members know my partner.     Past Medical History:   Diagnosis Date    Abscess of left kidney 2/16/2015    Acute deep vein thrombosis of right lower extremity ([address] 3/02/2015    Anticoagulated by anticoagulation treatment 3/06/2015    On Rivaroxaban    Bell's palsy 04/22/2017    no drooping    Candidal urinary tract infection 3/12/2015    Chronic anemia     Chronic diastolic heart failure ([address]     denies 10/2/19    Citrobacter infection 2/10/2015    Urinary tract infection    Decubitus ulcer of sacral region, stage 2 ([address] 2015    Dyslipidemia     Glaucoma     History of hypertension     History of kidney stones 2012    Hypertension     resolved    Hypoalbuminemia     Liver disease     non alcoholic cirrhosis    Obesity, Class II, BMI 35-39.9     Pneumothorax on right 2015    Postoperative anemia due to acute blood loss     [facility]     Thrombocytopenia ([facility]     Dr. Joey Santana    Type 2 diabetes mellitus ([facility]     diet controlled       Past Surgical History:   Procedure Laterality Date    HX  SECTION      HX COLOSTOMY  2015    HX LITHOTRIPSY      5 times    HX OTHER SURGICAL  1977    excision nodule thyroid    HX OTHER SURGICAL  2015    S/P CT-guided cholecystostomy tube placement (2015 - Dr. Magdalena Matos)    HX OTHER SURGICAL Left 2015    S/P CT-guided left lower renal pole abscess drainage and drainage catheter placement (2015 - Dr. Magdalena Matos)    HX OTHER SURGICAL Left 2015    S/P Percutaneous drainage of abscess via anterior abdomen (2015 - Dr. Bib Marquez)    HX OTHER SURGICAL Right 2015    S/P Right chest tube insertion (2015 - Dr. Bib Marquez)    HX RENAL BIOPSY Left 2015    S/P CT-guided left lower renal pole biopsy (2015 - Dr. Magdalena Matos)       Current Outpatient Medications   Medication Sig Dispense Refill    simvastatin (ZOCOR) 20 mg tablet Take 20 mg by mouth nightly.  latanoprost (XALATAN) 0.005 % ophthalmic solution Administer 1 Drop to both eyes nightly.  brimonidine-timolol (COMBIGAN) 0.2-0.5 % drop ophthalmic solution Administer 1 Drop to both eyes every twelve (12) hours.  ERGOCALCIFEROL, VITAMIN D2, (VITAMIN D2 PO) Take  by mouth every seven (7) days.  Biotin 2,500 mcg cap Take  by mouth daily.  potassium chloride (K-DUR, KLOR-CON) 20 mEq tablet Take 20 mEq by mouth daily.  ferrous sulfate 325 mg (65 mg iron) tablet Take 1 Tab by mouth two (2) times daily (with meals). (Patient taking differently: Take 325 mg by mouth Daily (before breakfast). ) 30 Tab 0    ascorbic acid (VITAMIN C) 250 mg tablet Take 1 Tab by mouth two (2) times daily (with meals). (Patient taking differently: Take 250 mg by mouth daily.) 30 Tab 0       Allergies   Allergen Reactions    Augmentin [Amoxicillin-Pot Clavulanate] Other (comments)     Mouth sores    Cefepime Rash     Arm rashes, chest rashes    Ciprofloxacin Rash     Other reaction(s): mild rash/itching  Only w/ IV Cipro, takes PO Cipro without problem       Social History     Socioeconomic History    Marital status:      Spouse name: Not on file    Number of children: Not on file    Years of education: Not on file    Highest education level: Not on file   Occupational History    Not on file   Social Needs    Financial resource strain: Not on file    Food insecurity:     Worry: Not on file     Inability: Not on file    Transportation needs:     Medical: Not on file     Non-medical: Not on file   Tobacco Use    Smoking status: Never Smoker    Smokeless tobacco: Never Used   Substance and Sexual Activity    Alcohol use: No    Drug use: No    Sexual activity: Never   Lifestyle    Physical activity:     Days per week: Not on file     Minutes per session: Not on file    Stress: Not on file   Relationships    Social connections:     Talks on phone: Not on file     Gets together: Not on file     Attends [employer] service: Not on file     Active member of club or organization: Not on file     Attends meetings of clubs or organizations: Not on file     Relationship status: Not on file    Intimate partner violence:     Fear of current or ex partner: Not on file     Emotionally abused: Not on file     Physically abused: Not on file     Forced sexual activity: Not on file   Other Topics Concern    Not on file   Social History Narrative    Not on file        FH: +CAD fathers side, but no premature CAD or SCD per se    Review of Systems    14 pt Review of Systems is negative unless otherwise mentioned in the HPI.     Wt Readings from Last 3 Encounters:   10/18/19 88.9 kg (196 lb)   10/02/19 81.6 kg (180 lb)   07/29/19 80.3 kg (177 lb)     Temp Readings from Last 3 Encounters:   10/15/19 98.5 °F (36.9 °C)   06/26/17 97.8 °F (36.6 °C)   04/22/17 99.2 °F (37.3 °C)     BP Readings from Last 3 Encounters:   10/18/19 122/76   10/15/19 144/60   07/29/19 126/60     Pulse Readings from Last 3 Encounters:   10/18/19 81   10/15/19 66   06/26/17 (!) 58       07/29/19   ECHO ADULT COMPLETE 07/30/2019 7/30/2019    Narrative · Left Ventricle: Normal cavity size and systolic function (ejection   fraction normal). Mildly increased wall thickness. Estimated left   ventricular ejection fraction is 56 - 60%. Biplane method used to measure   ejection fraction. Age-appropriate left ventricular diastolic function. · Aortic Valve: Mild aortic valve sclerosis with reduced excursion. Aortic   valve mean gradient is 13 mmHg. Aortic valve area is 1.5 cm2. Mild aortic   valve stenosis is present. · Mitral Valve: Mitral valve thickening. Mild mitral annular   calcification. Trace mitral valve regurgitation. Signed by: Chayito Vega DO   2015 echo had echo with aortic sclerosis but no significant stenosis at that time per review of report    Lab Results   Component Value Date/Time    Hemoglobin A1c 7.2 (H) 09/20/2015 04:00 PM     Lab Results   Component Value Date/Time    TSH 4.99 (H) 04/22/2015 12:35 AM     Lab Results   Component Value Date/Time    Cholesterol, total <50 01/30/2015 01:10 AM    HDL Cholesterol 14 (L) 01/30/2015 01:10 AM    LDL, calculated  01/30/2015 01:10 AM     Calculation not valid with this patient's other Lipid values. VLDL, calculated  01/30/2015 01:10 AM     Calculation not valid with this patient's other Lipid values.     Triglyceride 53 02/22/2015 05:50 AM    CHOL/HDL Ratio CANNOT BE CALCULATED 01/30/2015 01:10 AM         Physical Exam:    Visit Vitals  /76   Pulse 81   Ht 5' 9\" (1.753 m)   Wt 88.9 kg (196 lb) SpO2 96%   BMI 28.94 kg/m²      Physical Exam   Constitutional: She is oriented to person, place, and time. HENT:   Head: Normocephalic and atraumatic. Eyes: Pupils are equal, round, and reactive to light. EOM are normal.   Neck: No JVD present. Cardiovascular: Normal rate, regular rhythm and intact distal pulses. Exam reveals no gallop and no friction rub. Murmur heard. +GRIS, early peaking, radiates to carotids, upstroke WNL   Pulmonary/Chest: Effort normal and breath sounds normal. No respiratory distress. She has no wheezes. She has no rales. She exhibits no tenderness. Abdominal: Soft. Bowel sounds are normal.   Musculoskeletal: She exhibits no edema or tenderness. Neurological: She is alert and oriented to person, place, and time. Skin: Skin is warm and dry. Psychiatric: She has a normal mood and affect. EKG today shows: NSR, normal axis and intervals, no ST segment abnormalities    Impression and Plan:  Karlos Delaney is a 76 y. o. with:    Mild AS, CHERELLE 1.5 cm2  DM2  Dyslipidemia  Esophageal varices  H/o DVT and reaction to Xarelto? Significant time spent explaining etiology and natural h/o AS  Symptoms to look out for  RTC yearly routine surveillance, call sooner if symptoms, otherwise will get echo q 3-5 yrs    All questions answered. Thank you for allowing me to participate in the care of your patient, please do not hesitate to call with questions or concerns.     [facility]even [facility]., DO

## 2019-10-18 NOTE — PROGRESS NOTES
Carol Srinivasan presents today for   Chief Complaint   Patient presents with    Heart Murmur     no cardiac complaints       Carol Srinivasan preferred language for health care discussion is english/other. Is someone accompanying this pt? no    Is the patient using any DME equipment during OV? no    Depression Screening:  No flowsheet data found. Learning Assessment:  Learning Assessment 4/15/2015   PRIMARY LEARNER Patient   PRIMARY LANGUAGE ENGLISH   LEARNER PREFERENCE PRIMARY LISTENING   ANSWERED BY patient   RELATIONSHIP SELF       Abuse Screening:  Abuse Screening Questionnaire 10/18/2019   Do you ever feel afraid of your partner? N   Are you in a relationship with someone who physically or mentally threatens you? N   Is it safe for you to go home? Y       Fall Risk  Fall Risk Assessment, last 12 mths 10/18/2019   Able to walk? Yes   Fall in past 12 months? No       Pt currently taking Anticoagulant therapy? no    Coordination of Care:  1. Have you been to the ER, urgent care clinic since your last visit? Hospitalized since your last visit? no    2. Have you seen or consulted any other health care providers outside of the [address] [place] [address] since your last visit? Include any pap smears or colon screening.  no

## 2020-05-06 ENCOUNTER — HOSPITAL ENCOUNTER (OUTPATIENT)
Dept: MAMMOGRAPHY | Age: 75
Discharge: HOME | End: 2020-05-06
Attending: FAMILY MEDICINE
Payer: MEDICARE

## 2020-05-06 DIAGNOSIS — Z12.31 VISIT FOR SCREENING MAMMOGRAM: ICD-10-CM

## 2020-05-06 PROCEDURE — 77063 BREAST TOMOSYNTHESIS BI: CPT

## 2020-10-22 ENCOUNTER — OFFICE VISIT (OUTPATIENT)
Dept: CARDIOLOGY CLINIC | Age: 75
End: 2020-10-22
Payer: MEDICARE

## 2020-10-22 VITALS
BODY MASS INDEX: 29.77 KG/M2 | SYSTOLIC BLOOD PRESSURE: 130 MMHG | WEIGHT: 201 LBS | OXYGEN SATURATION: 97 % | DIASTOLIC BLOOD PRESSURE: 70 MMHG | HEIGHT: 69 IN | HEART RATE: 71 BPM

## 2020-10-22 DIAGNOSIS — R01.1 CARDIAC MURMUR: Primary | ICD-10-CM

## 2020-10-22 PROCEDURE — 99214 OFFICE O/P EST MOD 30 MIN: CPT | Performed by: INTERNAL MEDICINE

## 2020-10-22 PROCEDURE — G8536 NO DOC ELDER MAL SCRN: HCPCS | Performed by: INTERNAL MEDICINE

## 2020-10-22 PROCEDURE — G8399 PT W/DXA RESULTS DOCUMENT: HCPCS | Performed by: INTERNAL MEDICINE

## 2020-10-22 PROCEDURE — G8427 DOCREV CUR MEDS BY ELIG CLIN: HCPCS | Performed by: INTERNAL MEDICINE

## 2020-10-22 PROCEDURE — G8432 DEP SCR NOT DOC, RNG: HCPCS | Performed by: INTERNAL MEDICINE

## 2020-10-22 PROCEDURE — 93000 ELECTROCARDIOGRAM COMPLETE: CPT | Performed by: INTERNAL MEDICINE

## 2020-10-22 PROCEDURE — G8419 CALC BMI OUT NRM PARAM NOF/U: HCPCS | Performed by: INTERNAL MEDICINE

## 2020-10-22 NOTE — PROGRESS NOTES
Zofia Quintana    Aortic stenosis    HPI    Zofia Quintana is a 76 y.o. a pleasant female with no known heart disease who established with me last year in 2019 in the setting of a murmur. As you know patient has had a lot of chronic medical problems and was hospitalized a couple years ago with several complications. He apparently did have episodes of fluid overload, hypertension, and diabetes-but is not on any chronic medications for these conditions and denies any recent problems. I do know that she follows with GI- she tells me she has MENCHACA and also recently had an EGD (for varices) and was asked to start Nadolol. She is afraid to take this and comes asking me if she should. I know that coincidentally several years ago a heart murmur was noticed when she was in preop. This led to screening echocardiograms that showed aortic sclerosis without any significant stenosis. More recently the quality of her murmur changed and she had another repeat echocardiogram that finally showed some mild aortic stenosis. Please see this report below. I independently obtained and reviewed this report with the patient today. She completely denies chest discomfort, no shortness of breath she has never passed out. She can occasionally see her sock line on her legs but really does not have any problems with swelling as this is longstanding and under decent control. Despite the pandemic she tries to stay active in her house and yard and go walking.     Past Medical History:   Diagnosis Date    Abscess of left kidney 2/16/2015    Acute deep vein thrombosis of right lower extremity ([address] 3/02/2015    Anticoagulated by anticoagulation treatment 3/06/2015    On Rivaroxaban    Bell's palsy 04/22/2017    no drooping    Candidal urinary tract infection 3/12/2015    Chronic anemia     Chronic diastolic heart failure ([address]     denies 10/2/19    Citrobacter infection 2/10/2015    Urinary tract infection    Decubitus ulcer of sacral region, stage 2 [facility]     Dyslipidemia     Glaucoma     History of hypertension     History of kidney stones 2012    Hypertension     resolved    Hypoalbuminemia     Liver disease     non alcoholic cirrhosis    Obesity, Class II, BMI 35-39.9     Pneumothorax on right 2015    Postoperative anemia due to acute blood loss     Sepsis ([facility]     Thrombocytopenia ([facility]     Dr. Kathie Doss    Type 2 diabetes mellitus ([facility]     diet controlled       Past Surgical History:   Procedure Laterality Date    HX  SECTION      HX COLOSTOMY  2015    HX LITHOTRIPSY      5 times    HX OTHER SURGICAL  1977    excision nodule thyroid    HX OTHER SURGICAL  2015    S/P CT-guided cholecystostomy tube placement (2015 - Dr. Thierno Quiroz)    HX OTHER SURGICAL Left 2015    S/P CT-guided left lower renal pole abscess drainage and drainage catheter placement (2015 - Dr. Thierno Quiroz)    HX OTHER SURGICAL Left 2015    S/P Percutaneous drainage of abscess via anterior abdomen (2015 - Dr. Torrey Nix)    HX OTHER SURGICAL Right 2015    S/P Right chest tube insertion (2015 - Dr. Torrey Nix)    HX RENAL BIOPSY Left 2015    S/P CT-guided left lower renal pole biopsy (2015 - Dr. Thierno Quiroz)       Current Outpatient Medications   Medication Sig Dispense Refill    simvastatin (ZOCOR) 20 mg tablet Take 20 mg by mouth nightly.  latanoprost (XALATAN) 0.005 % ophthalmic solution Administer 1 Drop to both eyes nightly.  brimonidine-timolol (COMBIGAN) 0.2-0.5 % drop ophthalmic solution Administer 1 Drop to both eyes every twelve (12) hours.  ERGOCALCIFEROL, VITAMIN D2, (VITAMIN D2 PO) Take  by mouth every seven (7) days.  Biotin 2,500 mcg cap Take  by mouth daily.  potassium chloride (K-DUR, KLOR-CON) 20 mEq tablet Take 20 mEq by mouth daily.       ferrous sulfate 325 mg (65 mg iron) tablet Take 1 Tab by mouth two (2) times daily (with meals). (Patient taking differently: Take 325 mg by mouth Daily (before breakfast). ) 30 Tab 0    ascorbic acid (VITAMIN C) 250 mg tablet Take 1 Tab by mouth two (2) times daily (with meals).  (Patient taking differently: Take 250 mg by mouth daily.) 30 Tab 0       Allergies   Allergen Reactions    Augmentin [Amoxicillin-Pot Clavulanate] Other (comments)     Mouth sores    Cefepime Rash     Arm rashes, chest rashes    Ciprofloxacin Rash     Other reaction(s): mild rash/itching  Only w/ IV Cipro, takes PO Cipro without problem       Social History     Socioeconomic History    Marital status:      Spouse name: Not on file    Number of children: Not on file    Years of education: Not on file    Highest education level: Not on file   Occupational History    Not on file   Social Needs    Financial resource strain: Not on file    Food insecurity     Worry: Not on file     Inability: Not on file    Transportation needs     Medical: Not on file     Non-medical: Not on file   Tobacco Use    Smoking status: Never Smoker    Smokeless tobacco: Never Used   Substance and Sexual Activity    Alcohol use: No    Drug use: No    Sexual activity: Never   Lifestyle    Physical activity     Days per week: Not on file     Minutes per session: Not on file    Stress: Not on file   Relationships    Social connections     Talks on phone: Not on file     Gets together: Not on file     Attends [employer] service: Not on file     Active member of club or organization: Not on file     Attends meetings of clubs or organizations: Not on file     Relationship status: Not on file    Intimate partner violence     Fear of current or ex partner: Not on file     Emotionally abused: Not on file     Physically abused: Not on file     Forced sexual activity: Not on file   Other Topics Concern    Not on file   Social History Narrative    Not on file        FH: +CAD fathers side, but no premature CAD or SCD per se    Review of Systems    14 pt Review of Systems is negative unless otherwise mentioned in the HPI. Wt Readings from Last 3 Encounters:   10/22/20 91.2 kg (201 lb)   10/18/19 88.9 kg (196 lb)   10/02/19 81.6 kg (180 lb)     Temp Readings from Last 3 Encounters:   10/15/19 98.5 °F (36.9 °C)   06/26/17 97.8 °F (36.6 °C)   04/22/17 99.2 °F (37.3 °C)     BP Readings from Last 3 Encounters:   10/22/20 130/70   10/18/19 122/76   10/15/19 144/60     Pulse Readings from Last 3 Encounters:   10/22/20 71   10/18/19 81   10/15/19 66       07/29/19   ECHO ADULT COMPLETE 07/30/2019 7/30/2019    Narrative · Left Ventricle: Normal cavity size and systolic function (ejection   fraction normal). Mildly increased wall thickness. Estimated left   ventricular ejection fraction is 56 - 60%. Biplane method used to measure   ejection fraction. Age-appropriate left ventricular diastolic function. · Aortic Valve: Mild aortic valve sclerosis with reduced excursion. Aortic   valve mean gradient is 13 mmHg. Aortic valve area is 1.5 cm2. Mild aortic   valve stenosis is present. · Mitral Valve: Mitral valve thickening. Mild mitral annular   calcification. Trace mitral valve regurgitation. Signed by: Darshan Lake DO   2015 echo had echo with aortic sclerosis but no significant stenosis at that time per review of report    Lab Results   Component Value Date/Time    Hemoglobin A1c 7.2 (H) 09/20/2015 04:00 PM     Lab Results   Component Value Date/Time    TSH 4.99 (H) 04/22/2015 12:35 AM     Lab Results   Component Value Date/Time    Cholesterol, total <50 01/30/2015 01:10 AM    HDL Cholesterol 14 (L) 01/30/2015 01:10 AM    LDL, calculated  01/30/2015 01:10 AM     Calculation not valid with this patient's other Lipid values. VLDL, calculated  01/30/2015 01:10 AM     Calculation not valid with this patient's other Lipid values.     Triglyceride 53 02/22/2015 05:50 AM    CHOL/HDL Ratio CANNOT BE CALCULATED 01/30/2015 01:10 AM         Physical Exam:    Visit Vitals  /70   Pulse 71   Ht 5' 9\" (1.753 m)   Wt 91.2 kg (201 lb)   SpO2 97%   BMI 29.68 kg/m²      Physical Exam   Constitutional: She is oriented to person, place, and time. HENT:   Head: Normocephalic and atraumatic. Eyes: Pupils are equal, round, and reactive to light. EOM are normal.   Neck: No JVD present. Cardiovascular: Normal rate, regular rhythm and intact distal pulses. Exam reveals no gallop and no friction rub. Murmur heard. +GRIS, early peaking, radiates to carotids, upstroke WNL   Pulmonary/Chest: Effort normal and breath sounds normal. No respiratory distress. She has no wheezes. She has no rales. She exhibits no tenderness. Abdominal: Soft. Bowel sounds are normal.   Musculoskeletal:         General: No tenderness or edema. Neurological: She is alert and oriented to person, place, and time. Skin: Skin is warm and dry. Psychiatric: She has a normal mood and affect. EKG today shows: NSR, normal axis and intervals, no ST segment abnormalities    Impression and Plan:  Indu Desai is a 76 y.o. with:    Mild AS, CHERELLE 1.5 cm2  DM2  Dyslipidemia  Esophageal varices  H/o DVT and reaction to Xarelto? Significant time spent explaining etiology and natural h/o AS  Symptoms to look out for  RTC yearly routine surveillance, call sooner if symptoms, otherwise will get echo q 3-5 yrs    All questions answered. Thank you for allowing me to participate in the care of your patient, please do not hesitate to call with questions or concerns.     [facility]even [facility]., DO

## 2021-04-20 ENCOUNTER — TRANSCRIBE ORDER (OUTPATIENT)
Dept: SCHEDULING | Age: 76
End: 2021-04-20

## 2021-04-20 DIAGNOSIS — Z12.31 ENCOUNTER FOR SCREENING MAMMOGRAM FOR BREAST CANCER: Primary | ICD-10-CM

## 2021-05-04 ENCOUNTER — TRANSCRIBE ORDER (OUTPATIENT)
Dept: SCHEDULING | Age: 76
End: 2021-05-04

## 2021-05-04 DIAGNOSIS — K74.60 NON-ALCOHOLIC CIRRHOSIS (HCC): Primary | ICD-10-CM

## 2021-05-10 RX ORDER — ASCORBIC ACID 500 MG
500 TABLET ORAL DAILY
COMMUNITY

## 2021-05-10 RX ORDER — CALCIUM CARBONATE/VITAMIN D3 600 MG-125
1 TABLET ORAL DAILY
COMMUNITY

## 2021-05-10 RX ORDER — FERROUS SULFATE 324(65)MG
325 TABLET, DELAYED RELEASE (ENTERIC COATED) ORAL
COMMUNITY

## 2021-05-12 ENCOUNTER — HOSPITAL ENCOUNTER (OUTPATIENT)
Dept: LAB | Age: 76
Discharge: HOME | End: 2021-05-12
Payer: MEDICARE

## 2021-05-12 PROCEDURE — 36415 COLL VENOUS BLD VENIPUNCTURE: CPT

## 2021-05-12 PROCEDURE — U0003 INFECTIOUS AGENT DETECTION BY NUCLEIC ACID (DNA OR RNA); SEVERE ACUTE RESPIRATORY SYNDROME CORONAVIRUS 2 (SARS-COV-2) (CORONAVIRUS DISEASE [COVID-19]), AMPLIFIED PROBE TECHNIQUE, MAKING USE OF HIGH THROUGHPUT TECHNOLOGIES AS DESCRIBED BY CMS-2020-01-R: HCPCS

## 2021-05-13 LAB — SARS-COV-2, COV2NT: NOT DETECTED

## 2021-05-14 ENCOUNTER — HOSPITAL ENCOUNTER (OUTPATIENT)
Dept: ULTRASOUND IMAGING | Age: 76
Discharge: HOME | End: 2021-05-14
Attending: INTERNAL MEDICINE
Payer: MEDICARE

## 2021-05-14 ENCOUNTER — ANESTHESIA EVENT (OUTPATIENT)
Dept: ENDOSCOPY | Age: 76
End: 2021-05-14
Payer: MEDICARE

## 2021-05-14 DIAGNOSIS — K74.60 NON-ALCOHOLIC CIRRHOSIS (HCC): ICD-10-CM

## 2021-05-14 PROCEDURE — 76705 ECHO EXAM OF ABDOMEN: CPT

## 2021-05-17 ENCOUNTER — ANESTHESIA (OUTPATIENT)
Dept: ENDOSCOPY | Age: 76
End: 2021-05-17
Payer: MEDICARE

## 2021-05-17 ENCOUNTER — HOSPITAL ENCOUNTER (OUTPATIENT)
Age: 76
Setting detail: OUTPATIENT SURGERY
Discharge: HOME | End: 2021-05-17
Attending: INTERNAL MEDICINE | Admitting: INTERNAL MEDICINE
Payer: MEDICARE

## 2021-05-17 VITALS
RESPIRATION RATE: 16 BRPM | SYSTOLIC BLOOD PRESSURE: 169 MMHG | DIASTOLIC BLOOD PRESSURE: 74 MMHG | HEIGHT: 69 IN | BODY MASS INDEX: 29.77 KG/M2 | TEMPERATURE: 97 F | HEART RATE: 68 BPM | WEIGHT: 201 LBS | OXYGEN SATURATION: 100 %

## 2021-05-17 LAB
GLUCOSE BLD STRIP.AUTO-MCNC: 120 MG/DL (ref 70–110)
GLUCOSE BLD STRIP.AUTO-MCNC: 133 MG/DL (ref 70–110)

## 2021-05-17 PROCEDURE — 77030014243 HC BND LIG VRCES BSC -D: Performed by: INTERNAL MEDICINE

## 2021-05-17 PROCEDURE — 82962 GLUCOSE BLOOD TEST: CPT

## 2021-05-17 PROCEDURE — 74011250636 HC RX REV CODE- 250/636

## 2021-05-17 PROCEDURE — 74011250636 HC RX REV CODE- 250/636: Performed by: ANESTHESIOLOGY

## 2021-05-17 PROCEDURE — 99100 ANES PT EXTEME AGE<1 YR&>70: CPT | Performed by: ANESTHESIOLOGY

## 2021-05-17 PROCEDURE — 76060000031 HC ANESTHESIA FIRST 0.5 HR: Performed by: INTERNAL MEDICINE

## 2021-05-17 PROCEDURE — 76040000019: Performed by: INTERNAL MEDICINE

## 2021-05-17 PROCEDURE — 74011250636 HC RX REV CODE- 250/636: Performed by: NURSE ANESTHETIST, CERTIFIED REGISTERED

## 2021-05-17 PROCEDURE — 77030008565 HC TBNG SUC IRR ERBE -B: Performed by: INTERNAL MEDICINE

## 2021-05-17 PROCEDURE — 2709999900 HC NON-CHARGEABLE SUPPLY: Performed by: INTERNAL MEDICINE

## 2021-05-17 PROCEDURE — 00731 ANES UPR GI NDSC PX NOS: CPT | Performed by: ANESTHESIOLOGY

## 2021-05-17 PROCEDURE — 74011000250 HC RX REV CODE- 250: Performed by: NURSE ANESTHETIST, CERTIFIED REGISTERED

## 2021-05-17 PROCEDURE — 74011000250 HC RX REV CODE- 250: Performed by: ANESTHESIOLOGY

## 2021-05-17 RX ORDER — SODIUM CHLORIDE 0.9 % (FLUSH) 0.9 %
5-40 SYRINGE (ML) INJECTION EVERY 8 HOURS
Status: DISCONTINUED | OUTPATIENT
Start: 2021-05-17 | End: 2021-05-17 | Stop reason: HOSPADM

## 2021-05-17 RX ORDER — ONDANSETRON 2 MG/ML
4 INJECTION INTRAMUSCULAR; INTRAVENOUS ONCE
Status: DISCONTINUED | OUTPATIENT
Start: 2021-05-17 | End: 2021-05-17 | Stop reason: HOSPADM

## 2021-05-17 RX ORDER — PROPOFOL 10 MG/ML
INJECTION, EMULSION INTRAVENOUS AS NEEDED
Status: DISCONTINUED | OUTPATIENT
Start: 2021-05-17 | End: 2021-05-17 | Stop reason: HOSPADM

## 2021-05-17 RX ORDER — LABETALOL HCL 20 MG/4 ML
SYRINGE (ML) INTRAVENOUS
Status: COMPLETED
Start: 2021-05-17 | End: 2021-05-17

## 2021-05-17 RX ORDER — SODIUM CHLORIDE, SODIUM LACTATE, POTASSIUM CHLORIDE, CALCIUM CHLORIDE 600; 310; 30; 20 MG/100ML; MG/100ML; MG/100ML; MG/100ML
50 INJECTION, SOLUTION INTRAVENOUS CONTINUOUS
Status: DISCONTINUED | OUTPATIENT
Start: 2021-05-17 | End: 2021-05-17 | Stop reason: HOSPADM

## 2021-05-17 RX ORDER — SODIUM CHLORIDE 0.9 % (FLUSH) 0.9 %
5-40 SYRINGE (ML) INJECTION AS NEEDED
Status: DISCONTINUED | OUTPATIENT
Start: 2021-05-17 | End: 2021-05-17 | Stop reason: HOSPADM

## 2021-05-17 RX ORDER — MAGNESIUM SULFATE 100 %
4 CRYSTALS MISCELLANEOUS AS NEEDED
Status: DISCONTINUED | OUTPATIENT
Start: 2021-05-17 | End: 2021-05-17 | Stop reason: HOSPADM

## 2021-05-17 RX ORDER — INSULIN LISPRO 100 [IU]/ML
INJECTION, SOLUTION INTRAVENOUS; SUBCUTANEOUS ONCE
Status: DISCONTINUED | OUTPATIENT
Start: 2021-05-17 | End: 2021-05-17 | Stop reason: HOSPADM

## 2021-05-17 RX ORDER — DEXTROSE 50 % IN WATER (D50W) INTRAVENOUS SYRINGE
25-50 AS NEEDED
Status: DISCONTINUED | OUTPATIENT
Start: 2021-05-17 | End: 2021-05-17 | Stop reason: HOSPADM

## 2021-05-17 RX ORDER — SODIUM CHLORIDE, SODIUM LACTATE, POTASSIUM CHLORIDE, CALCIUM CHLORIDE 600; 310; 30; 20 MG/100ML; MG/100ML; MG/100ML; MG/100ML
INJECTION, SOLUTION INTRAVENOUS
Status: DISCONTINUED | OUTPATIENT
Start: 2021-05-17 | End: 2021-05-17 | Stop reason: HOSPADM

## 2021-05-17 RX ORDER — FAMOTIDINE 20 MG/1
20 TABLET, FILM COATED ORAL ONCE
Status: DISCONTINUED | OUTPATIENT
Start: 2021-05-17 | End: 2021-05-17

## 2021-05-17 RX ORDER — LABETALOL HCL 20 MG/4 ML
10 SYRINGE (ML) INTRAVENOUS AS NEEDED
Status: DISCONTINUED | OUTPATIENT
Start: 2021-05-17 | End: 2021-05-17 | Stop reason: HOSPADM

## 2021-05-17 RX ORDER — LIDOCAINE HYDROCHLORIDE 20 MG/ML
INJECTION, SOLUTION EPIDURAL; INFILTRATION; INTRACAUDAL; PERINEURAL AS NEEDED
Status: DISCONTINUED | OUTPATIENT
Start: 2021-05-17 | End: 2021-05-17 | Stop reason: HOSPADM

## 2021-05-17 RX ADMIN — PROPOFOL 20 MG: 10 INJECTION, EMULSION INTRAVENOUS at 13:05

## 2021-05-17 RX ADMIN — SODIUM CHLORIDE, SODIUM LACTATE, POTASSIUM CHLORIDE, AND CALCIUM CHLORIDE 50 ML/HR: 600; 310; 30; 20 INJECTION, SOLUTION INTRAVENOUS at 11:45

## 2021-05-17 RX ADMIN — PROPOFOL 20 MG: 10 INJECTION, EMULSION INTRAVENOUS at 13:08

## 2021-05-17 RX ADMIN — PROPOFOL 20 MG: 10 INJECTION, EMULSION INTRAVENOUS at 13:12

## 2021-05-17 RX ADMIN — PROPOFOL 20 MG: 10 INJECTION, EMULSION INTRAVENOUS at 13:07

## 2021-05-17 RX ADMIN — PROPOFOL 20 MG: 10 INJECTION, EMULSION INTRAVENOUS at 13:06

## 2021-05-17 RX ADMIN — SODIUM CHLORIDE, SODIUM LACTATE, POTASSIUM CHLORIDE, AND CALCIUM CHLORIDE: 600; 310; 30; 20 INJECTION, SOLUTION INTRAVENOUS at 13:00

## 2021-05-17 RX ADMIN — FAMOTIDINE 20 MG: 10 INJECTION INTRAVENOUS at 12:03

## 2021-05-17 RX ADMIN — Medication 5 MG: at 13:55

## 2021-05-17 RX ADMIN — LIDOCAINE HYDROCHLORIDE 80 MG: 20 INJECTION, SOLUTION EPIDURAL; INFILTRATION; INTRACAUDAL; PERINEURAL at 13:05

## 2021-05-17 RX ADMIN — LABETALOL HYDROCHLORIDE 5 MG: 5 INJECTION, SOLUTION INTRAVENOUS at 13:55

## 2021-05-17 RX ADMIN — PROPOFOL 20 MG: 10 INJECTION, EMULSION INTRAVENOUS at 13:10

## 2021-05-17 NOTE — H&P
History and Physical reviewed; I have examined the patient and there are no pertinent changes. Umm Isaacs MD, MD   12:15 PM 5/17/2021  [facility], [address] [place] [address]  [email or web address] [facility]

## 2021-05-17 NOTE — ANESTHESIA PREPROCEDURE EVALUATION
Relevant Problems   No relevant active problems       Anesthetic History   No history of anesthetic complications            Review of Systems / Medical History  Patient summary reviewed, nursing notes reviewed and pertinent labs reviewed    Pulmonary  Within defined limits                 Neuro/Psych   Within defined limits           Cardiovascular    Hypertension: well controlled      CHF        Exercise tolerance: >4 METS     GI/Hepatic/Renal           Liver disease     Endo/Other    Diabetes: well controlled         Other Findings              Physical Exam    Airway  Mallampati: III  TM Distance: 4 - 6 cm  Neck ROM: normal range of motion   Mouth opening: Normal     Cardiovascular    Rhythm: regular  Rate: normal    Murmur: Grade 3, Mitral area     Dental    Dentition: Poor dentition     Pulmonary  Breath sounds clear to auscultation               Abdominal  GI exam deferred       Other Findings            Anesthetic Plan    ASA: 3  Anesthesia type: MAC          Induction: Intravenous  Anesthetic plan and risks discussed with: Patient

## 2021-05-17 NOTE — ANESTHESIA POSTPROCEDURE EVALUATION
Procedure(s):  UPPER ENDOSCOPY / banding. MAC    Anesthesia Post Evaluation      Multimodal analgesia: multimodal analgesia used between 6 hours prior to anesthesia start to PACU discharge  Patient location during evaluation: bedside  Patient participation: complete - patient participated  Level of consciousness: awake  Pain score: 0  Airway patency: patent  Anesthetic complications: no  Cardiovascular status: acceptable  Respiratory status: acceptable  Hydration status: acceptable  Post anesthesia nausea and vomiting:  none      INITIAL Post-op Vital signs:   Vitals Value Taken Time   /48 05/17/21 1332   Temp 36.8 °C (98.3 °F) 05/17/21 1324   Pulse 75 05/17/21 1332   Resp 17 05/17/21 1332   SpO2 99 % 05/17/21 1332   Vitals shown include unvalidated device data.

## 2021-05-17 NOTE — DISCHARGE INSTRUCTIONS
Patient Education        Upper GI Endoscopy: What to Expect at [address] had an upper GI endoscopy. Your doctor used a thin, lighted tube that bends to look at the inside of your esophagus, your stomach, and the first part of the small intestine, called the duodenum. After you have an endoscopy, you will stay at the hospital or clinic for 1 to 2 hours. This will allow the medicine to wear off. You will be able to go home after your doctor or nurse checks to make sure that you're not having any problems. You may have to stay overnight if you had treatment during the test. You may have a sore throat for a day or two after the test.  This care sheet gives you a general idea about what to expect after the test.  How can you care for yourself at home? Activity   · Rest as much as you need to after you go home. · You should be able to go back to your usual activities the day after the test.  Diet   · Follow your doctor's directions for eating after the test.  · Drink plenty of fluids (unless your doctor has told you not to). Medications   · If you have a sore throat the day after the test, use an over-the-counter spray to numb your throat. Follow-up care is a key part of your treatment and safety. Be sure to make and go to all appointments, and call your doctor if you are having problems. It's also a good idea to know your test results and keep a list of the medicines you take. When should you call for help? Call 911 anytime you think you may need emergency care. For example, call if:    · You passed out (lost consciousness).     · You have trouble breathing.     · You pass maroon or bloody stools.    Call your doctor now or seek immediate medical care if:    · You have pain that does not get better after your take pain medicine.     · You have new or worse belly pain.     · You have blood in your stools.     · You are sick to your stomach and cannot keep fluids down.     · You have a fever.     · You cannot pass stools or gas. Watch closely for changes in your health, and be sure to contact your doctor if:    · Your throat still hurts after a day or two.     · You do not get better as expected. Where can you learn more? Go to http://www.hensley.com/  Enter J454 in the search box to learn more about \"Upper GI Endoscopy: What to Expect at Home. \"  Current as of: April 15, 2020               Content Version: 12.8  © 2006-2021 [employer]. Care instructions adapted under license by [employer] (which disclaims liability or warranty for this information). If you have questions about a medical condition or this instruction, always ask your healthcare professional. [address] any warranty or liability for your use of this information. Patient Education        Esophageal Varices: Care Instructions  Your Care Instructions     Esophageal varices (say \"ee-sof-uh-ILSA-ul BEGQ-qy-rvoj\") are veins in your esophagus that are bigger than normal. Your esophagus is a tube. It carries food from your throat to your stomach. These veins get big because of extra pressure. This pressure makes the walls of the veins weak. Then they can rupture and cause very serious bleeding. This problem is usually found in people who have serious liver disease. Treatments include medicines and procedures to help lower the pressure in the veins. Talk with your doctor about the best treatment for you. If you have bleeding from this problem, there is a risk that it will happen again. In this case, it's important to go back and follow up with your doctor. Follow-up care is a key part of your treatment and safety. Be sure to make and go to all appointments, and call your doctor if you are having problems. It's also a good idea to know your test results and keep a list of the medicines you take. How can you care for yourself at home? · Be safe with medicines.  Take your medicines exactly as prescribed. Call your doctor if you think you are having a problem with your medicine. You will get more details on the specific medicines your doctor prescribes. · Talk to your doctor before you take any other medicines. These include over-the-counter medicines, vitamins, and herbal products. · Avoid aspirin, ibuprofen (Advil, Motrin), and naproxen (Aleve). These can cause sores in your stomach or esophagus. · Do not drink alcohol. It increases your risk of bleeding. It can also make liver damage worse. Tell your doctor if you need help to quit. Counseling, support groups, and sometimes medicines can help you stay sober. When should you call for help? Call 911 anytime you think you may need emergency care. For example, call if:    · You have trouble breathing.     · You vomit blood or what looks like coffee grounds. Call your doctor now or seek immediate medical care if:    · You feel very sleepy or confused.     · You have new or worse belly pain.     · You have a fever.     · There is a new or increasing yellow tint to your skin or the whites of your eyes.     · You have any abnormal bleeding, such as:  ? Nosebleeds. ? Vaginal bleeding that is different (heavier, more frequent, at a different time of the month) than what you are used to.  ? Bloody or black stools, or rectal bleeding. ? Bloody or pink urine. Watch closely for changes in your health, and be sure to contact your doctor if:    · You have any problems.     · Your belly is getting bigger.     · You are gaining weight. Where can you learn more? Go to http://www.gray.com/  Enter S770310 in the search box to learn more about \"Esophageal Varices: Care Instructions. \"  Current as of: April 15, 2020               Content Version: 12.8  © 3860-7581 [employer], [email or web address].    Care instructions adapted under license by [employer] (which disclaims liability or warranty for this information). If you have questions about a medical condition or this instruction, always ask your healthcare professional. [place] [address] any warranty or liability for your use of this information.          Patient armband removed and shredded

## 2021-05-17 NOTE — PROCEDURES
[place]  [facility], [address]      Brief Procedure Note    Janina Nova  [birth year]  [id number]    Date of Procedure: 5/17/2021    Preoperative diagnosis: Non-alcoholic cirrhosis:  569.3  Varices, esophageal:  456.1 - I85.00  Colostomy:  V44.3 - Z93.3    Postoperative diagnosis:  large esophageal varices with banding x 2    Type of Anesthesia: MAC (monitered anesthesia care)    Description of Findings: same as post op dx    Procedure: Procedure(s):  UPPER ENDOSCOPY / banding    :  Dr. Chana Madison MD    Assistant(s): [unfilled]    Type of Anesthesia:MAC     EBL:None,    Implants: Two bands placed for variceal ligation    Specimens: None  Findings: See printed and scanned procedure note    Complications: None    Dr. Chana Madison MD  5/17/2021  1:19 PM

## 2021-05-19 ENCOUNTER — HOSPITAL ENCOUNTER (OUTPATIENT)
Dept: MAMMOGRAPHY | Age: 76
Discharge: HOME | End: 2021-05-19
Attending: FAMILY MEDICINE
Payer: MEDICARE

## 2021-05-19 DIAGNOSIS — Z12.31 ENCOUNTER FOR SCREENING MAMMOGRAM FOR BREAST CANCER: ICD-10-CM

## 2021-05-19 PROCEDURE — 77063 BREAST TOMOSYNTHESIS BI: CPT

## 2021-06-14 ENCOUNTER — TRANSCRIBE ORDER (OUTPATIENT)
Dept: SCHEDULING | Age: 76
End: 2021-06-14

## 2021-06-14 DIAGNOSIS — M17.11 OSTEOARTHRITIS OF RIGHT KNEE: Primary | ICD-10-CM

## 2021-06-17 ENCOUNTER — HOSPITAL ENCOUNTER (OUTPATIENT)
Dept: MRI IMAGING | Age: 76
Discharge: HOME | End: 2021-06-17
Attending: PHYSICIAN ASSISTANT
Payer: MEDICARE

## 2021-06-17 DIAGNOSIS — M17.11 OSTEOARTHRITIS OF RIGHT KNEE: ICD-10-CM

## 2021-06-17 PROCEDURE — 73721 MRI JNT OF LWR EXTRE W/O DYE: CPT

## 2021-06-18 NOTE — PERIOP NOTES
PRE-SURGICAL INSTRUCTIONS        Patient's Name:  Tiffany Mckeon      LDXNA'T Date:  6/18/2021              Surgery Date:  6/30/2021                1. Do NOT eat or drink anything, including candy, gum, or ice chips after midnight on6/29/2021, unless you have specific instructions from your surgeon or anesthesia provider to do so.  2. You may brush your teeth before coming to the hospital.  3. No smoking 24 hours prior to the day of surgery. 4. No alcohol 24 hours prior to the day of surgery. 5. No recreational drugs for one week prior to the day of surgery. 6. Leave all valuables, including money/purse, at home. 7. Remove all jewelry, nail polish, acrylic nails, and makeup (including mascara); no lotions powders, deodorant, or perfume/cologne/after shave on the skin. 8. Glasses/contact lenses and dentures may be worn to the hospital.  They will be removed prior to surgery. 9. Call your doctor if symptoms of a cold or illness develop within 24-48 hours prior to your surgery. 10.  AN ADULT MUST DRIVE YOU HOME AFTER OUTPATIENT SURGERY. 11.  If you are having an outpatient procedure, please make arrangements for a responsible adult to be with you for 24 hours after your surgery. 12.  NO VISITORS in the hospital at this time for outpatient procedures. Exceptions may be made for surgical admissions, per nursing unit guidelines      Special Instructions:      Bring list of CURRENT medications. Bring any pertinent legal medical records. On the day of surgery, come in the main entrance of [facility]. Let the  at the desk know you are there for surgery. A staff member will come escort you to the surgical area on the second floor.     If you have any questions or concerns, please do not hesitate to call:     (Prior to the day of surgery) PAT department:  [phone]   (Day of surgery) Pre-Op department:  [phone]    These surgical instructions were reviewed with patient during the PAT phone call.

## 2021-06-25 ENCOUNTER — HOSPITAL ENCOUNTER (OUTPATIENT)
Dept: LAB | Age: 76
Discharge: HOME | End: 2021-06-25
Payer: MEDICARE

## 2021-06-25 PROCEDURE — U0003 INFECTIOUS AGENT DETECTION BY NUCLEIC ACID (DNA OR RNA); SEVERE ACUTE RESPIRATORY SYNDROME CORONAVIRUS 2 (SARS-COV-2) (CORONAVIRUS DISEASE [COVID-19]), AMPLIFIED PROBE TECHNIQUE, MAKING USE OF HIGH THROUGHPUT TECHNOLOGIES AS DESCRIBED BY CMS-2020-01-R: HCPCS

## 2021-06-26 LAB — SARS-COV-2, COV2NT: NOT DETECTED

## 2021-06-29 ENCOUNTER — ANESTHESIA EVENT (OUTPATIENT)
Dept: ENDOSCOPY | Age: 76
End: 2021-06-29
Payer: MEDICARE

## 2021-06-30 ENCOUNTER — HOSPITAL ENCOUNTER (OUTPATIENT)
Age: 76
Setting detail: OUTPATIENT SURGERY
Discharge: HOME | End: 2021-06-30
Attending: INTERNAL MEDICINE | Admitting: INTERNAL MEDICINE
Payer: MEDICARE

## 2021-06-30 ENCOUNTER — ANESTHESIA (OUTPATIENT)
Dept: ENDOSCOPY | Age: 76
End: 2021-06-30
Payer: MEDICARE

## 2021-06-30 VITALS
BODY MASS INDEX: 29.77 KG/M2 | DIASTOLIC BLOOD PRESSURE: 71 MMHG | HEART RATE: 75 BPM | HEIGHT: 69 IN | RESPIRATION RATE: 20 BRPM | OXYGEN SATURATION: 99 % | SYSTOLIC BLOOD PRESSURE: 173 MMHG | WEIGHT: 201 LBS | TEMPERATURE: 97.2 F

## 2021-06-30 PROCEDURE — 99100 ANES PT EXTEME AGE<1 YR&>70: CPT | Performed by: ANESTHESIOLOGY

## 2021-06-30 PROCEDURE — 74011250636 HC RX REV CODE- 250/636: Performed by: ANESTHESIOLOGY

## 2021-06-30 PROCEDURE — 76060000031 HC ANESTHESIA FIRST 0.5 HR: Performed by: INTERNAL MEDICINE

## 2021-06-30 PROCEDURE — 74011000250 HC RX REV CODE- 250: Performed by: NURSE ANESTHETIST, CERTIFIED REGISTERED

## 2021-06-30 PROCEDURE — 2709999900 HC NON-CHARGEABLE SUPPLY: Performed by: INTERNAL MEDICINE

## 2021-06-30 PROCEDURE — 77030019988 HC FCPS ENDOSC DISP BSC -B: Performed by: INTERNAL MEDICINE

## 2021-06-30 PROCEDURE — 00731 ANES UPR GI NDSC PX NOS: CPT | Performed by: ANESTHESIOLOGY

## 2021-06-30 PROCEDURE — 74011250636 HC RX REV CODE- 250/636: Performed by: NURSE ANESTHETIST, CERTIFIED REGISTERED

## 2021-06-30 PROCEDURE — 00731 ANES UPR GI NDSC PX NOS: CPT | Performed by: NURSE ANESTHETIST, CERTIFIED REGISTERED

## 2021-06-30 PROCEDURE — 77030014243 HC BND LIG VRCES BSC -D: Performed by: INTERNAL MEDICINE

## 2021-06-30 PROCEDURE — 99100 ANES PT EXTEME AGE<1 YR&>70: CPT | Performed by: NURSE ANESTHETIST, CERTIFIED REGISTERED

## 2021-06-30 PROCEDURE — 76040000019: Performed by: INTERNAL MEDICINE

## 2021-06-30 PROCEDURE — 77030008565 HC TBNG SUC IRR ERBE -B: Performed by: INTERNAL MEDICINE

## 2021-06-30 PROCEDURE — 74011000250 HC RX REV CODE- 250: Performed by: ANESTHESIOLOGY

## 2021-06-30 RX ORDER — LIDOCAINE HYDROCHLORIDE 10 MG/ML
0.1 INJECTION, SOLUTION EPIDURAL; INFILTRATION; INTRACAUDAL; PERINEURAL AS NEEDED
Status: DISCONTINUED | OUTPATIENT
Start: 2021-06-30 | End: 2021-06-30 | Stop reason: HOSPADM

## 2021-06-30 RX ORDER — SODIUM CHLORIDE, SODIUM LACTATE, POTASSIUM CHLORIDE, CALCIUM CHLORIDE 600; 310; 30; 20 MG/100ML; MG/100ML; MG/100ML; MG/100ML
50 INJECTION, SOLUTION INTRAVENOUS CONTINUOUS
Status: DISCONTINUED | OUTPATIENT
Start: 2021-06-30 | End: 2021-06-30 | Stop reason: HOSPADM

## 2021-06-30 RX ORDER — SODIUM CHLORIDE 0.9 % (FLUSH) 0.9 %
5-40 SYRINGE (ML) INJECTION AS NEEDED
Status: DISCONTINUED | OUTPATIENT
Start: 2021-06-30 | End: 2021-06-30 | Stop reason: HOSPADM

## 2021-06-30 RX ORDER — PROPOFOL 10 MG/ML
INJECTION, EMULSION INTRAVENOUS AS NEEDED
Status: DISCONTINUED | OUTPATIENT
Start: 2021-06-30 | End: 2021-06-30 | Stop reason: HOSPADM

## 2021-06-30 RX ORDER — DEXTROSE 50 % IN WATER (D50W) INTRAVENOUS SYRINGE
25-50 AS NEEDED
Status: DISCONTINUED | OUTPATIENT
Start: 2021-06-30 | End: 2021-06-30 | Stop reason: HOSPADM

## 2021-06-30 RX ORDER — MAGNESIUM SULFATE 100 %
4 CRYSTALS MISCELLANEOUS AS NEEDED
Status: DISCONTINUED | OUTPATIENT
Start: 2021-06-30 | End: 2021-06-30 | Stop reason: HOSPADM

## 2021-06-30 RX ORDER — SODIUM CHLORIDE 0.9 % (FLUSH) 0.9 %
5-40 SYRINGE (ML) INJECTION EVERY 8 HOURS
Status: DISCONTINUED | OUTPATIENT
Start: 2021-06-30 | End: 2021-06-30 | Stop reason: HOSPADM

## 2021-06-30 RX ORDER — FAMOTIDINE 20 MG/1
20 TABLET, FILM COATED ORAL ONCE
Status: DISCONTINUED | OUTPATIENT
Start: 2021-06-30 | End: 2021-06-30 | Stop reason: CLARIF

## 2021-06-30 RX ORDER — INSULIN LISPRO 100 [IU]/ML
INJECTION, SOLUTION INTRAVENOUS; SUBCUTANEOUS ONCE
Status: DISCONTINUED | OUTPATIENT
Start: 2021-06-30 | End: 2021-06-30 | Stop reason: HOSPADM

## 2021-06-30 RX ORDER — SODIUM CHLORIDE, SODIUM LACTATE, POTASSIUM CHLORIDE, CALCIUM CHLORIDE 600; 310; 30; 20 MG/100ML; MG/100ML; MG/100ML; MG/100ML
25 INJECTION, SOLUTION INTRAVENOUS CONTINUOUS
Status: DISCONTINUED | OUTPATIENT
Start: 2021-06-30 | End: 2021-06-30 | Stop reason: HOSPADM

## 2021-06-30 RX ORDER — LIDOCAINE HYDROCHLORIDE 20 MG/ML
INJECTION, SOLUTION EPIDURAL; INFILTRATION; INTRACAUDAL; PERINEURAL AS NEEDED
Status: DISCONTINUED | OUTPATIENT
Start: 2021-06-30 | End: 2021-06-30 | Stop reason: HOSPADM

## 2021-06-30 RX ADMIN — FAMOTIDINE 20 MG: 10 INJECTION INTRAVENOUS at 12:21

## 2021-06-30 RX ADMIN — PROPOFOL 80 MG: 10 INJECTION, EMULSION INTRAVENOUS at 13:35

## 2021-06-30 RX ADMIN — PROPOFOL 20 MG: 10 INJECTION, EMULSION INTRAVENOUS at 13:38

## 2021-06-30 RX ADMIN — PROPOFOL 20 MG: 10 INJECTION, EMULSION INTRAVENOUS at 13:36

## 2021-06-30 RX ADMIN — PROPOFOL 20 MG: 10 INJECTION, EMULSION INTRAVENOUS at 13:39

## 2021-06-30 RX ADMIN — LIDOCAINE HYDROCHLORIDE 80 MG: 20 INJECTION, SOLUTION EPIDURAL; INFILTRATION; INTRACAUDAL; PERINEURAL at 13:39

## 2021-06-30 RX ADMIN — SODIUM CHLORIDE, SODIUM LACTATE, POTASSIUM CHLORIDE, AND CALCIUM CHLORIDE 25 ML/HR: 600; 310; 30; 20 INJECTION, SOLUTION INTRAVENOUS at 12:20

## 2021-06-30 NOTE — PROCEDURES
[place]na  [facility], [address]      Brief Procedure Note    Tiffany Mckeon  [birth year]  [id number]    Date of Procedure: 6/30/2021    Preoperative diagnosis: Non-alcoholic cirrhosis:  608.3  Varices, esophageal:  456.1 - I85.00  Colostomy:  V44.3 - Z93.3    Postoperative diagnosis:  esophageal varices     Type of Anesthesia: MAC (monitered anesthesia care)    Description of Findings: same as post op dx    Procedure: Procedure(s):  UPPER ENDOSCOPY w/variceal banding (x2)    :  Dr. Bishop Edis MD    Assistant(s): [unfilled]    Type of Anesthesia:MAC     EBL:None, no implants.     Specimens: * No specimens in log *    Findings: See printed and scanned procedure note    Complications: None    Dr. Bishop Edis MD  6/30/2021  1:47 PM

## 2021-06-30 NOTE — H&P
Chief Complaint: Varices    History of present illness: Here for possible re banding of varices    PMH:   Past Medical History:   Diagnosis Date    Abscess of left kidney 2/16/2015    Acute deep vein thrombosis of right lower extremity ([address] 3/02/2015    Anticoagulated by anticoagulation treatment 3/06/2015    On Rivaroxaban    Bell's palsy 04/22/2017    no drooping    Candidal urinary tract infection 3/12/2015    Chronic anemia     Chronic diastolic heart failure (HCC)     denies 10/2/19    Chronic pain     RT knee    Citrobacter infection 2/10/2015    Urinary tract infection    COVID-19 vaccine series completed     Decubitus ulcer of sacral region, stage 2 ([address] 2015    Diabetes ([address]     no meds    Dyslipidemia     Glaucoma     Heart failure ([address]     Heart murmur 2020    History of hypertension     History of kidney stones 6/19/2012    Hypertension     no medications    Hypoalbuminemia     Liver disease     non alcoholic cirrhosis    Obesity, Class II, BMI 35-39.9     Pneumothorax on right 02/27/2015    Postoperative anemia due to acute blood loss     Sepsis ([address] 2015    Thrombocytopenia ([facility]     Dr. Adrienne Lucero     Allergies:    Allergies   Allergen Reactions    Augmentin [Amoxicillin-Pot Clavulanate] Other (comments)     Mouth sores    Cefepime Rash     Arm rashes, chest rashes    Ciprofloxacin Rash     Other reaction(s): mild rash/itching  Only w/ IV Cipro, takes PO Cipro without problem     Medications:   Current Facility-Administered Medications:     lidocaine (PF) (XYLOCAINE) 10 mg/mL (1 %) injection 0.1 mL, 0.1 mL, SubCUTAneous, PRN, Vale Cha CRNA    lactated Ringers infusion, 25 mL/hr, IntraVENous, CONTINUOUS, Vlae Cha CRNA, Last Rate: 25 mL/hr at 06/30/21 1220, 25 mL/hr at 06/30/21 1220    sodium chloride (NS) flush 5-40 mL, 5-40 mL, IntraVENous, Q8H, Vale Cha CRNA    sodium chloride (NS) flush 5-40 mL, 5-40 mL, IntraVENous, PRN, Vale Pulley, CRNA    insulin lispro (HUMALOG) injection, , SubCUTAneous, ONCE, Sangeeta Blackwell CRNA  FH:   Family History   Problem Relation Age of Onset    Heart Attack Father     Heart Attack Brother     Breast Cancer Paternal Aunt      Social:   Social History     Socioeconomic History    Marital status:      Spouse name: Not on file    Number of children: Not on file    Years of education: Not on file    Highest education level: Not on file   Tobacco Use    Smoking status: Never Smoker    Smokeless tobacco: Never Used   Vaping Use    Vaping Use: Never used   Substance and Sexual Activity    Alcohol use: Never    Drug use: Never    Sexual activity: Not Currently     Social Determinants of Health     Financial Resource Strain:     Difficulty of Paying Living Expenses:    Food Insecurity:     Worried About Running Out of Food in the Last Year:     Ran Out of Food in the Last Year:    Transportation Needs:     Lack of Transportation (Medical):      Lack of Transportation (Non-Medical):    Physical Activity:     Days of Exercise per Week:     Minutes of Exercise per Session:    Stress:     Feeling of Stress :    Social Connections:     Frequency of Communication with Friends and Family:     Frequency of Social Gatherings with Friends and Family:     Attends Yazdanism Services:     Active Member of Clubs or Organizations:     Attends Club or Organization Meetings:     Marital Status:      Surgical H:   Past Surgical History:   Procedure Laterality Date    HX  SECTION      HX COLOSTOMY  2015    HX LITHOTRIPSY      5 times    HX OTHER SURGICAL  1977    excision nodule thyroid    HX OTHER SURGICAL  2015    S/P CT-guided cholecystostomy tube placement (2015 - Dr. Pat Moore)    HX OTHER SURGICAL Left 2015    S/P CT-guided left lower renal pole abscess drainage and drainage catheter placement (2015 - Dr. Pat Moore)    [facility] 2/17/2015    S/P Percutaneous drainage of abscess via anterior abdomen (2/17/2015 - Dr. Deal Cons)    HX OTHER SURGICAL Right 2/27/2015    S/P Right chest tube insertion (2/27/2015 - Dr. Deal Cons)    HX RENAL BIOPSY Left 2/16/2015    S/P CT-guided left lower renal pole biopsy (2/16/2015 - Dr. Josselyn Gold)   [facility] [address] UNLISTED  2015    Colostomy       ROS: negative    Physical Exam:   Visit Vitals  BP (!) 175/70 (BP 1 Location: Right upper arm, BP Patient Position: At rest)   Pulse 65   Temp 97.7 °F (36.5 °C)   Resp 18   Ht 5' 9\" (1.753 m)   Wt 91.2 kg (201 lb)   SpO2 97%   Breastfeeding No   BMI 29.68 kg/m²     General appearance: alert, no distress  Eyes: pupils equal and reactive, extraocular eye movements intact  Nodes: No gross adenopathy in neck. Skin: no spider angiomata, jaundice, palmar erythema   Respiratory: clear to auscultation bilaterally  Cardiovascular: regular heart rate, no murmurs, no JVD, normal rate and regular rhythm  Abdomen: soft, non-tender, liver not enlarged, spleen not palpable, no obvious ascites  Extremities: no muscle wasting, no gross arthritic changes  Neurologic: alert and oriented, cranial nerves grossly intact, no asterixis    Labs: No results found for this or any previous visit (from the past 24 hour(s)). Imp/ Plan: Will proceed with EGD with banding of varices as planned. Risk benefits alternative including but not limited to infection, bleeding, perforation of viscous, allergic reaction and resultant morbidity and mortality was discussed. Chance of missing a significant lesion due to various reasons were discussed.       Abran Cuellar MD  [facility], [address]

## 2021-06-30 NOTE — ANESTHESIA POSTPROCEDURE EVALUATION
Procedure(s):  UPPER ENDOSCOPY w/variceal banding (x2). MAC    Anesthesia Post Evaluation      Multimodal analgesia: multimodal analgesia used between 6 hours prior to anesthesia start to PACU discharge  Patient location during evaluation: bedside  Patient participation: complete - patient participated  Level of consciousness: awake  Pain management: adequate  Airway patency: patent  Anesthetic complications: no  Cardiovascular status: stable  Respiratory status: acceptable  Hydration status: acceptable  Post anesthesia nausea and vomiting:  controlled      INITIAL Post-op Vital signs:   Vitals Value Taken Time   /61 06/30/21 1404   Temp 36.7 °C (98 °F) 06/30/21 1348   Pulse 74 06/30/21 1405   Resp 16 06/30/21 1405   SpO2 99 % 06/30/21 1405   Vitals shown include unvalidated device data.

## 2021-06-30 NOTE — DISCHARGE INSTRUCTIONS
Patient Education        Upper GI Endoscopy: What to Expect at [address] had an upper GI endoscopy. Your doctor used a thin, lighted tube that bends to look at the inside of your esophagus, your stomach, and the first part of the small intestine, called the duodenum. After you have an endoscopy, you will stay at the hospital or clinic for 1 to 2 hours. This will allow the medicine to wear off. You will be able to go home after your doctor or nurse checks to make sure that you're not having any problems. You may have to stay overnight if you had treatment during the test. You may have a sore throat for a day or two after the test.  This care sheet gives you a general idea about what to expect after the test.  How can you care for yourself at home? Activity   · Rest as much as you need to after you go home. · You should be able to go back to your usual activities the day after the test.  Diet   · Follow your doctor's directions for eating after the test.  · Drink plenty of fluids (unless your doctor has told you not to). Medications   · If you have a sore throat the day after the test, use an over-the-counter spray to numb your throat. Follow-up care is a key part of your treatment and safety. Be sure to make and go to all appointments, and call your doctor if you are having problems. It's also a good idea to know your test results and keep a list of the medicines you take. When should you call for help? Call 911 anytime you think you may need emergency care. For example, call if:    · You passed out (lost consciousness).     · You have trouble breathing.     · You pass maroon or bloody stools.    Call your doctor now or seek immediate medical care if:    · You have pain that does not get better after your take pain medicine.     · You have new or worse belly pain.     · You have blood in your stools.     · You are sick to your stomach and cannot keep fluids down.     · You have a fever.     · You cannot pass stools or gas. Watch closely for changes in your health, and be sure to contact your doctor if:    · Your throat still hurts after a day or two.     · You do not get better as expected. Where can you learn more? Go to http://www.hensley.com/  Enter J454 in the search box to learn more about \"Upper GI Endoscopy: What to Expect at Home. \"  Current as of: April 15, 2020               Content Version: 12.8  © 2006-2021 [employer]. Care instructions adapted under license by [employer] (which disclaims liability or warranty for this information). If you have questions about a medical condition or this instruction, always ask your healthcare professional. [address] any warranty or liability for your use of this information. Patient Education        Esophageal Varices: Care Instructions  Your Care Instructions     Esophageal varices (say \"ee-sof-uh-ILSA-ul FIFC-lw-ltcl\") are veins in your esophagus that are bigger than normal. Your esophagus is a tube. It carries food from your throat to your stomach. These veins get big because of extra pressure. This pressure makes the walls of the veins weak. Then they can rupture and cause very serious bleeding. This problem is usually found in people who have serious liver disease. Treatments include medicines and procedures to help lower the pressure in the veins. Talk with your doctor about the best treatment for you. If you have bleeding from this problem, there is a risk that it will happen again. In this case, it's important to go back and follow up with your doctor. Follow-up care is a key part of your treatment and safety. Be sure to make and go to all appointments, and call your doctor if you are having problems. It's also a good idea to know your test results and keep a list of the medicines you take. How can you care for yourself at home? · Be safe with medicines.  Take your medicines exactly as prescribed. Call your doctor if you think you are having a problem with your medicine. You will get more details on the specific medicines your doctor prescribes. · Talk to your doctor before you take any other medicines. These include over-the-counter medicines, vitamins, and herbal products. · Avoid aspirin, ibuprofen (Advil, Motrin), and naproxen (Aleve). These can cause sores in your stomach or esophagus. · Do not drink alcohol. It increases your risk of bleeding. It can also make liver damage worse. Tell your doctor if you need help to quit. Counseling, support groups, and sometimes medicines can help you stay sober. When should you call for help? Call 911 anytime you think you may need emergency care. For example, call if:    · You have trouble breathing.     · You vomit blood or what looks like coffee grounds. Call your doctor now or seek immediate medical care if:    · You feel very sleepy or confused.     · You have new or worse belly pain.     · You have a fever.     · There is a new or increasing yellow tint to your skin or the whites of your eyes.     · You have any abnormal bleeding, such as:  ? Nosebleeds. ? Vaginal bleeding that is different (heavier, more frequent, at a different time of the month) than what you are used to.  ? Bloody or black stools, or rectal bleeding. ? Bloody or pink urine. Watch closely for changes in your health, and be sure to contact your doctor if:    · You have any problems.     · Your belly is getting bigger.     · You are gaining weight. Where can you learn more? Go to http://sybil-layo.info/  Enter [id number] in the search box to learn more about \"Esophageal Varices: Care Instructions. \"  Current as of: April 15, 2020               Content Version: 12.8  © 2006-2021 Healthwise, [place].    Care instructions adapted under license by [employer] (which disclaims liability or warranty for this information). If you have questions about a medical condition or this instruction, always ask your healthcare professional. [address] any warranty or liability for your use of this information. Patient Education        Variceal Banding: What to Expect at Home  Your Recovery     Your doctor used a lighted tube (endoscope, or scope) to help fix one or more enlarged veins in your esophagus. That's the tube that carries food from your mouth to your stomach. The enlarged veins are called varices. The doctor placed elastic rings around the veins. The rings look like rubber bands. The bands cut off blood flow through the vein. They help prevent internal bleeding. You may need to have the procedure several times to control the varices and prevent bleeding. Your doctor will probably check the varices every 3 to 12 months. This care sheet gives you a general idea about how long it will take for you to recover. But each person recovers at a different pace. Follow the steps below to get better as quickly as possible. How can you care for yourself at home? Activity    · Rest when you feel tired. Getting enough sleep will help you recover.     · Avoid strenuous activities, such as bicycle riding, jogging, weight lifting, or aerobic exercise, until your doctor says it is okay.     · Allow your body to heal. Don't move quickly or lift anything heavy until you are feeling better.     · You may need to take a day or two off work. It depends on the type of work you do and how you feel.     · For your safety, do not drive or operate any machinery that could be dangerous. Wait until the medicine wears off and you can think clearly and react easily.     · Your doctor may suggest sleeping with more pillows so you aren't lying flat. Diet    · Do not eat or drink for 2 hours after your procedure.     · Drink plenty of fluids (unless your doctor has told you not to).      · Try just liquids for your first meal after the surgery. Then you can have regular food if it feels okay. You might try soft foods until your throat feels better.     · Do not drink alcohol. It increases your risk of bleeding. It can also make liver damage worse. Tell your doctor if you need help to quit. Counseling, support groups, and sometimes medicines can help you stay sober. Medicines    · Your doctor will tell you if and when you can restart your medicines. He or she will also give you instructions about taking any new medicines. Follow-up care is a key part of your treatment and safety. Be sure to make and go to all appointments, and call your doctor if you are having problems. It's also a good idea to know your test results and keep a list of the medicines you take. When should you call for help? Call 911 anytime you think you may need emergency care. For example, call if:    · You passed out (lost consciousness).     · You are short of breath. Call your doctor now or seek immediate medical care if:    · You vomit blood or what looks like coffee grounds.     · Your stools are maroon or very bloody.     · You are sick to your stomach or can't keep down fluids.     · You have pain that does not get better after you take pain medicine.     · You have a fever. Watch closely for changes in your health, and be sure to contact your doctor if:    · Your throat still hurts, food feels like it sticks in your throat, or you have trouble swallowing after a day or two.     · You do not get better as expected. Where can you learn more? Go to http://www.gray.com/  Enter E135 in the search box to learn more about \"Variceal Banding: What to Expect at Home. \"  Current as of: April 15, 2020               Content Version: 12.8  © 9784-5445 Healthwise, [employer]. Care instructions adapted under license by [employer] (which disclaims liability or warranty for this information).  If you have questions about a medical condition or this instruction, always ask your healthcare professional. [place] [address] any warranty or liability for your use of this information.

## 2021-06-30 NOTE — ANESTHESIA PREPROCEDURE EVALUATION
Relevant Problems   RENAL FAILURE   (+) Abscess of left kidney      ENDOCRINE   (+) Type 2 diabetes mellitus (HCC)      HEMATOLOGY   (+) Chronic anemia   (+) Postoperative anemia due to acute blood loss       Anesthetic History   No history of anesthetic complications            Review of Systems / Medical History  Patient summary reviewed and pertinent labs reviewed    Pulmonary  Within defined limits                 Neuro/Psych   Within defined limits           Cardiovascular    Hypertension                   GI/Hepatic/Renal           Liver disease     Endo/Other    Diabetes: type 2         Other Findings              Physical Exam    Airway  Mallampati: II  TM Distance: 4 - 6 cm  Neck ROM: normal range of motion   Mouth opening: Normal     Cardiovascular  Regular rate and rhythm,  S1 and S2 normal,  no murmur, click, rub, or gallop             Dental    Dentition: Full upper dentures and Lower partial plate     Pulmonary  Breath sounds clear to auscultation               Abdominal  GI exam deferred       Other Findings            Anesthetic Plan    ASA: 3  Anesthesia type: MAC          Induction: Intravenous  Anesthetic plan and risks discussed with: Patient

## 2021-10-28 ENCOUNTER — OFFICE VISIT (OUTPATIENT)
Dept: CARDIOLOGY CLINIC | Age: 76
End: 2021-10-28
Payer: MEDICARE

## 2021-10-28 VITALS
SYSTOLIC BLOOD PRESSURE: 186 MMHG | HEART RATE: 83 BPM | OXYGEN SATURATION: 96 % | BODY MASS INDEX: 29.83 KG/M2 | DIASTOLIC BLOOD PRESSURE: 74 MMHG | WEIGHT: 202 LBS

## 2021-10-28 DIAGNOSIS — R00.2 PALPITATIONS: Primary | ICD-10-CM

## 2021-10-28 PROCEDURE — G8427 DOCREV CUR MEDS BY ELIG CLIN: HCPCS | Performed by: INTERNAL MEDICINE

## 2021-10-28 PROCEDURE — G8399 PT W/DXA RESULTS DOCUMENT: HCPCS | Performed by: INTERNAL MEDICINE

## 2021-10-28 PROCEDURE — G8536 NO DOC ELDER MAL SCRN: HCPCS | Performed by: INTERNAL MEDICINE

## 2021-10-28 PROCEDURE — G8432 DEP SCR NOT DOC, RNG: HCPCS | Performed by: INTERNAL MEDICINE

## 2021-10-28 PROCEDURE — G8419 CALC BMI OUT NRM PARAM NOF/U: HCPCS | Performed by: INTERNAL MEDICINE

## 2021-10-28 PROCEDURE — 1090F PRES/ABSN URINE INCON ASSESS: CPT | Performed by: INTERNAL MEDICINE

## 2021-10-28 PROCEDURE — 99214 OFFICE O/P EST MOD 30 MIN: CPT | Performed by: INTERNAL MEDICINE

## 2021-10-28 PROCEDURE — 1101F PT FALLS ASSESS-DOCD LE1/YR: CPT | Performed by: INTERNAL MEDICINE

## 2021-10-28 PROCEDURE — 93000 ELECTROCARDIOGRAM COMPLETE: CPT | Performed by: INTERNAL MEDICINE

## 2021-10-28 NOTE — PROGRESS NOTES
Manuela Sebastian presents today for [address] preferred language for health care discussion is english/other. Is someone accompanying this pt? NO    Is the patient using any DME equipment during OV? NO    Depression Screening:  No flowsheet data found. Learning Assessment:  Learning Assessment 4/15/2015   PRIMARY LEARNER Patient   PRIMARY LANGUAGE ENGLISH   LEARNER PREFERENCE PRIMARY LISTENING   ANSWERED BY patient   RELATIONSHIP SELF       Abuse Screening:  Abuse Screening Questionnaire 10/18/2019   Do you ever feel afraid of your partner? N   Are you in a relationship with someone who physically or mentally threatens you? N   Is it safe for you to go home? Y       Fall Risk  Fall Risk Assessment, last 12 mths 10/18/2019   Able to walk? Yes   Fall in past 12 months? No       Pt currently taking Anticoagulant therapy? NO  Coordination of Care:  1. Have you been to the ER, urgent care clinic since your last visit? Hospitalized since your last visit? NO    2. Have you seen or consulted any other health care providers outside of the [address] [place] [address] since your last visit? Include any pap smears or colon screening. NA

## 2021-10-28 NOTE — PROGRESS NOTES
Marcel Bee    Aortic stenosis    HPI    Marcel Bee is a 68 y.o. a pleasant female with no known heart disease who established with me last year in 2019 in the setting of a murmur. As you know patient has had a lot of chronic medical problems and was hospitalized a couple years ago with several complications. She apparently did have episodes of fluid overload, hypertension, and diabetes-but is not on any chronic medications for these conditions and denies any recent problems. I do know that she follows with GI- she tells me she has MENCHACA and also recently had an EGD (for varices) and was asked to start Nadolol. She is afraid to take this and comes asking me if she should. I know that coincidentally several years ago a heart murmur was noticed when she was in preop. This led to screening echocardiograms that showed aortic sclerosis without any significant stenosis. More recently the quality of her murmur changed and she had another repeat echocardiogram that finally showed some mild aortic stenosis. Please see this report below. I independently obtained and reviewed this report with the patient today. She completely denies chest discomfort, no shortness of breath she has never passed out. She can occasionally see her sock line on her legs but really does not have any problems with swelling as this is longstanding and under decent control. Despite the pandemic she tries to stay active in her house and yard and go walking.     Past Medical History:   Diagnosis Date    Abscess of left kidney 2/16/2015    Acute deep vein thrombosis of right lower extremity ([address] 3/02/2015    Anticoagulated by anticoagulation treatment 3/06/2015    On Rivaroxaban    Bell's palsy 04/22/2017    no drooping    Candidal urinary tract infection 3/12/2015    Chronic anemia     Chronic diastolic heart failure (HCC)     denies 10/2/19    Chronic pain     RT knee    Citrobacter infection 2/10/2015    Urinary tract infection    COVID-19 vaccine series completed     Decubitus ulcer of sacral region, stage 2 ([address]     Diabetes ([address]     no meds    Dyslipidemia     Glaucoma     Heart failure ([address]     Heart murmur     History of hypertension     History of kidney stones 2012    Hypertension     no medications    Hypoalbuminemia     Liver disease     non alcoholic cirrhosis    Obesity, Class II, BMI 35-39.9     Pneumothorax on right 2015    Postoperative anemia due to acute blood loss     Sepsis ([address]     Thrombocytopenia [facility]     Dr. Tanner Taylor       Past Surgical History:   Procedure Laterality Date    HX  SECTION      HX COLOSTOMY  2015    HX LITHOTRIPSY      5 times    HX OTHER SURGICAL  1977    excision nodule thyroid    HX OTHER SURGICAL  2015    S/P CT-guided cholecystostomy tube placement (2015 - Dr. Kal Hoang)    HX OTHER SURGICAL Left 2015    S/P CT-guided left lower renal pole abscess drainage and drainage catheter placement (2015 - Dr. Kal Hoang)    HX OTHER SURGICAL Left 2015    S/P Percutaneous drainage of abscess via anterior abdomen (2015 - Dr. Jordan Godinez)    HX OTHER SURGICAL Right 2015    S/P Right chest tube insertion (2015 - Dr. Jordan Godinez)    HX RENAL BIOPSY Left 2015    S/P CT-guided left lower renal pole biopsy (2015 - Dr. Kal Hoang)   [address] UNLISTED      Colostomy       Current Outpatient Medications   Medication Sig Dispense Refill    ascorbic acid, vitamin C, (Vitamin C) 500 mg tablet Take 500 mg by mouth daily.  calcium-cholecalciferol, d3, (CALCIUM 600 + D) 600-125 mg-unit tab Take 1 Tab by mouth daily.  ferrous sulfate 324 mg (65 mg iron) tablet Take 325 mg by mouth Daily (before breakfast).  simvastatin (ZOCOR) 20 mg tablet Take 20 mg by mouth nightly.       latanoprost (XALATAN) 0.005 % ophthalmic solution Administer 1 Drop to both eyes nightly.  brimonidine-timolol (COMBIGAN) 0.2-0.5 % drop ophthalmic solution Administer 1 Drop to both eyes every twelve (12) hours.  ERGOCALCIFEROL, VITAMIN D2, (VITAMIN D2 PO) Take  by mouth every seven (7) days.  Biotin 2,500 mcg cap Take  by mouth daily.  potassium chloride (K-DUR, KLOR-CON) 20 mEq tablet Take 20 mEq by mouth daily. Allergies   Allergen Reactions    Augmentin [Amoxicillin-Pot Clavulanate] Other (comments)     Mouth sores    Cefepime Rash     Arm rashes, chest rashes    Ciprofloxacin Rash     Other reaction(s): mild rash/itching  Only w/ IV Cipro, takes PO Cipro without problem       Social History     Socioeconomic History    Marital status:      Spouse name: Not on file    Number of children: Not on file    Years of education: Not on file    Highest education level: Not on file   Occupational History    Not on file   Tobacco Use    Smoking status: Never Smoker    Smokeless tobacco: Never Used   Vaping Use    Vaping Use: Never used   Substance and Sexual Activity    Alcohol use: Never    Drug use: Never    Sexual activity: Not Currently   Other Topics Concern    Not on file   Social History Narrative    Not on file     Social Determinants of Health     Financial Resource Strain:     Difficulty of Paying Living Expenses:    Food Insecurity:     Worried About Running Out of Food in the Last Year:     Ran Out of Food in the Last Year:    Transportation Needs:     Lack of Transportation (Medical):      Lack of Transportation (Non-Medical):    Physical Activity:     Days of Exercise per Week:     Minutes of Exercise per Session:    Stress:     Feeling of Stress :    Social Connections:     Frequency of Communication with Friends and Family:     Frequency of Social Gatherings with Friends and Family:     Attends Alevism Services:     Active Member of Clubs or Organizations:     Attends Club or Organization Meetings:    Lisa Marital Status:    Intimate Partner Violence:     Fear of Current or Ex-Partner:     Emotionally Abused:     Physically Abused:     Sexually Abused:         FH: +CAD fathers side, but no premature CAD or SCD per se    Review of Systems    14 pt Review of Systems is negative unless otherwise mentioned in the HPI. Wt Readings from Last 3 Encounters:   10/28/21 91.6 kg (202 lb)   06/30/21 91.2 kg (201 lb)   05/17/21 91.2 kg (201 lb)     Temp Readings from Last 3 Encounters:   06/30/21 97.2 °F (36.2 °C)   05/17/21 97 °F (36.1 °C)   10/15/19 98.5 °F (36.9 °C)     BP Readings from Last 3 Encounters:   10/28/21 (!) 186/74   06/30/21 (!) 173/71   05/17/21 (!) 169/74     Pulse Readings from Last 3 Encounters:   10/28/21 83   06/30/21 75   05/17/21 68       07/29/19   ECHO ADULT COMPLETE 07/30/2019 7/30/2019    Narrative · Left Ventricle: Normal cavity size and systolic function (ejection   fraction normal). Mildly increased wall thickness. Estimated left   ventricular ejection fraction is 56 - 60%. Biplane method used to measure   ejection fraction. Age-appropriate left ventricular diastolic function. · Aortic Valve: Mild aortic valve sclerosis with reduced excursion. Aortic   valve mean gradient is 13 mmHg. Aortic valve area is 1.5 cm2. Mild aortic   valve stenosis is present. · Mitral Valve: Mitral valve thickening. Mild mitral annular   calcification. Trace mitral valve regurgitation.         Signed by: Bonifacio Cardoso DO   2015 echo had echo with aortic sclerosis but no significant stenosis at that time per review of report    Lab Results   Component Value Date/Time    Hemoglobin A1c 7.2 (H) 09/20/2015 04:00 PM     Lab Results   Component Value Date/Time    TSH 4.99 (H) 04/22/2015 12:35 AM     Lab Results   Component Value Date/Time    Cholesterol, total <50 01/30/2015 01:10 AM    HDL Cholesterol 14 (L) 01/30/2015 01:10 AM    LDL, calculated  01/30/2015 01:10 AM     Calculation not valid with this patient's other Lipid values. VLDL, calculated  01/30/2015 01:10 AM     Calculation not valid with this patient's other Lipid values. Triglyceride 53 02/22/2015 05:50 AM    CHOL/HDL Ratio CANNOT BE CALCULATED 01/30/2015 01:10 AM         Physical Exam:    Visit Vitals  BP (!) 186/74   Pulse 83   Wt 91.6 kg (202 lb)   SpO2 96%   BMI 29.83 kg/m²      Physical Exam  HENT:      Head: Normocephalic and atraumatic. Eyes:      Pupils: Pupils are equal, round, and reactive to light. Neck:      Vascular: No JVD. Cardiovascular:      Rate and Rhythm: Normal rate and regular rhythm. Heart sounds: Murmur heard. No friction rub. No gallop. Comments: +GRIS, early peaking, radiates to carotids, upstroke WNL  Pulmonary:      Effort: Pulmonary effort is normal. No respiratory distress. Breath sounds: Normal breath sounds. No wheezing or rales. Chest:      Chest wall: No tenderness. Abdominal:      General: Bowel sounds are normal.      Palpations: Abdomen is soft. Musculoskeletal:         General: No tenderness. Skin:     General: Skin is warm and dry. Neurological:      Mental Status: She is alert and oriented to person, place, and time. EKG today shows: NSR, normal axis and intervals, no ST segment abnormalities    Impression and Plan:  Ailin Chen is a 68 y.o. with:    Mild AS, CHERELLE 1.5 cm2  DM2  Dyslipidemia  Esophageal varices  H/o DVT and reaction to Xarelto? Significant time spent explaining etiology and natural h/o AS  Symptoms to look out for  RTC yearly routine surveillance, call sooner if symptoms, otherwise will get echo q 3-5 yrs    All questions answered. Thank you for allowing me to participate in the care of your patient, please do not hesitate to call with questions or concerns. Follow-up and Dispositions    · Return in about 1 year (around 10/28/2022).      [address] DO

## 2021-10-29 ENCOUNTER — TELEPHONE (OUTPATIENT)
Dept: CARDIOLOGY CLINIC | Age: 76
End: 2021-10-29

## 2021-10-29 RX ORDER — AMLODIPINE BESYLATE 5 MG/1
5 TABLET ORAL DAILY
Qty: 30 TABLET | Refills: 5 | Status: SHIPPED | OUTPATIENT
Start: 2021-10-29 | End: 2022-06-20

## 2021-10-29 NOTE — TELEPHONE ENCOUNTER
Patient called in stating her b/p is still elevated after seeing us in office, its running in 180's/80s reviewed the following with Dr Elma Haider. Verbal order and read back per Trinity Rankin, DO  Start amlodipine 5mg once daily   Follow up with primary care in a few weeks. This has been fully explained to the patient, who indicates understanding.

## 2021-12-09 ENCOUNTER — TRANSCRIBE ORDER (OUTPATIENT)
Dept: SCHEDULING | Age: 76
End: 2021-12-09

## 2021-12-09 DIAGNOSIS — K75.81 LIVER CIRRHOSIS SECONDARY TO NASH (HCC): Primary | ICD-10-CM

## 2021-12-09 DIAGNOSIS — K74.60 LIVER CIRRHOSIS SECONDARY TO NASH (HCC): Primary | ICD-10-CM

## 2021-12-09 DIAGNOSIS — D69.59 SECONDARY THROMBOCYTOPENIA: ICD-10-CM

## 2021-12-09 DIAGNOSIS — Z92.29 COVID-19 VACCINE SERIES COMPLETED: ICD-10-CM

## 2021-12-09 DIAGNOSIS — D73.1 HYPERSPLENISM SYNDROME: ICD-10-CM

## 2021-12-09 DIAGNOSIS — I85.00 ESOPHAGEAL VARICES WITHOUT BLEEDING (HCC): ICD-10-CM

## 2021-12-12 ENCOUNTER — HOSPITAL ENCOUNTER (EMERGENCY)
Age: 76
Discharge: ANOTHER HOSPITAL | End: 2021-12-12
Attending: EMERGENCY MEDICINE
Payer: MEDICARE

## 2021-12-12 ENCOUNTER — APPOINTMENT (OUTPATIENT)
Dept: CT IMAGING | Age: 76
End: 2021-12-12
Attending: EMERGENCY MEDICINE
Payer: MEDICARE

## 2021-12-12 ENCOUNTER — APPOINTMENT (OUTPATIENT)
Dept: GENERAL RADIOLOGY | Age: 76
End: 2021-12-12
Attending: EMERGENCY MEDICINE
Payer: MEDICARE

## 2021-12-12 VITALS
TEMPERATURE: 97.9 F | WEIGHT: 190 LBS | HEIGHT: 69 IN | RESPIRATION RATE: 17 BRPM | BODY MASS INDEX: 28.14 KG/M2 | OXYGEN SATURATION: 98 % | HEART RATE: 86 BPM | DIASTOLIC BLOOD PRESSURE: 62 MMHG | SYSTOLIC BLOOD PRESSURE: 161 MMHG

## 2021-12-12 DIAGNOSIS — I62.9 INTRACRANIAL HEMORRHAGE (HCC): ICD-10-CM

## 2021-12-12 DIAGNOSIS — S72.002A CLOSED FRACTURE OF NECK OF LEFT FEMUR, INITIAL ENCOUNTER (HCC): Primary | ICD-10-CM

## 2021-12-12 LAB
ANION GAP SERPL CALC-SCNC: 9 MMOL/L (ref 3–18)
APTT PPP: 21.1 SEC (ref 23–36.4)
BASOPHILS # BLD: 0 K/UL (ref 0–0.1)
BASOPHILS NFR BLD: 0 % (ref 0–2)
BUN SERPL-MCNC: 24 MG/DL (ref 7–18)
BUN/CREAT SERPL: 24 (ref 12–20)
CALCIUM SERPL-MCNC: 9.4 MG/DL (ref 8.5–10.1)
CHLORIDE SERPL-SCNC: 106 MMOL/L (ref 100–111)
CO2 SERPL-SCNC: 27 MMOL/L (ref 21–32)
CREAT SERPL-MCNC: 1.02 MG/DL (ref 0.6–1.3)
DIFFERENTIAL METHOD BLD: ABNORMAL
EOSINOPHIL # BLD: 0.1 K/UL (ref 0–0.4)
EOSINOPHIL NFR BLD: 1 % (ref 0–5)
ERYTHROCYTE [DISTWIDTH] IN BLOOD BY AUTOMATED COUNT: 13.3 % (ref 11.6–14.5)
GLUCOSE SERPL-MCNC: 154 MG/DL (ref 74–99)
HCT VFR BLD AUTO: 40.9 % (ref 35–45)
HGB BLD-MCNC: 13.9 G/DL (ref 12–16)
IMM GRANULOCYTES # BLD AUTO: 0.1 K/UL (ref 0–0.04)
IMM GRANULOCYTES NFR BLD AUTO: 1 % (ref 0–0.5)
INR PPP: 1 (ref 0.8–1.2)
LYMPHOCYTES # BLD: 0.7 K/UL (ref 0.9–3.6)
LYMPHOCYTES NFR BLD: 9 % (ref 21–52)
MCH RBC QN AUTO: 29.3 PG (ref 24–34)
MCHC RBC AUTO-ENTMCNC: 34 G/DL (ref 31–37)
MCV RBC AUTO: 86.1 FL (ref 78–100)
MONOCYTES # BLD: 0.3 K/UL (ref 0.05–1.2)
MONOCYTES NFR BLD: 4 % (ref 3–10)
NEUTS SEG # BLD: 6.7 K/UL (ref 1.8–8)
NEUTS SEG NFR BLD: 85 % (ref 40–73)
NRBC # BLD: 0 K/UL (ref 0–0.01)
NRBC BLD-RTO: 0 PER 100 WBC
PLATELET # BLD AUTO: 58 K/UL (ref 135–420)
PMV BLD AUTO: 11.1 FL (ref 9.2–11.8)
POTASSIUM SERPL-SCNC: 4.3 MMOL/L (ref 3.5–5.5)
PROTHROMBIN TIME: 13.2 SEC (ref 11.5–15.2)
RBC # BLD AUTO: 4.75 M/UL (ref 4.2–5.3)
SODIUM SERPL-SCNC: 142 MMOL/L (ref 136–145)
WBC # BLD AUTO: 7.8 K/UL (ref 4.6–13.2)

## 2021-12-12 PROCEDURE — 99285 EMERGENCY DEPT VISIT HI MDM: CPT

## 2021-12-12 PROCEDURE — 96374 THER/PROPH/DIAG INJ IV PUSH: CPT

## 2021-12-12 PROCEDURE — 72125 CT NECK SPINE W/O DYE: CPT

## 2021-12-12 PROCEDURE — 85610 PROTHROMBIN TIME: CPT

## 2021-12-12 PROCEDURE — 73700 CT LOWER EXTREMITY W/O DYE: CPT

## 2021-12-12 PROCEDURE — 85025 COMPLETE CBC W/AUTO DIFF WBC: CPT

## 2021-12-12 PROCEDURE — 73502 X-RAY EXAM HIP UNI 2-3 VIEWS: CPT

## 2021-12-12 PROCEDURE — 73552 X-RAY EXAM OF FEMUR 2/>: CPT

## 2021-12-12 PROCEDURE — 80048 BASIC METABOLIC PNL TOTAL CA: CPT

## 2021-12-12 PROCEDURE — 71045 X-RAY EXAM CHEST 1 VIEW: CPT

## 2021-12-12 PROCEDURE — 74011250636 HC RX REV CODE- 250/636: Performed by: EMERGENCY MEDICINE

## 2021-12-12 PROCEDURE — 85730 THROMBOPLASTIN TIME PARTIAL: CPT

## 2021-12-12 PROCEDURE — 70450 CT HEAD/BRAIN W/O DYE: CPT

## 2021-12-12 RX ORDER — MORPHINE SULFATE 4 MG/ML
4 INJECTION INTRAVENOUS
Status: COMPLETED | OUTPATIENT
Start: 2021-12-12 | End: 2021-12-12

## 2021-12-12 RX ADMIN — MORPHINE SULFATE 4 MG: 4 INJECTION INTRAVENOUS at 15:33

## 2021-12-12 NOTE — ED NOTES
Pt is speaking with her family via cell phone to update them on plan of care. Pt is being transferred to [facility]. Pts belongings are in a bag.

## 2021-12-12 NOTE — ED NOTES
TRANSFER - OUT REPORT:    Verbal report given to Lori Turner RN) on Rudy Dhillon  being transferred to [facility] ER(unit) for urgent transfer       Report consisted of patients Situation, Background, Assessment and   Recommendations(SBAR). Information from the following report(s) SBAR, ED Summary, [facility] and Cardiac Rhythm NSR was reviewed with the receiving nurse. Lines:   Peripheral IV 12/12/21 Right Forearm (Active)   Site Assessment Clean, dry, & intact 12/12/21 1502   Phlebitis Assessment 0 12/12/21 1502   Infiltration Assessment 4 12/12/21 1502   Dressing Status Clean, dry, & intact 12/12/21 1502   Hub Color/Line Status Pink 12/12/21 1502   Action Taken Blood drawn 12/12/21 1502        Opportunity for questions and clarification was provided. Patient transported with:   Belongings, cardiac monitor, Wilder Catheter.

## 2021-12-12 NOTE — ED NOTES
Pt is alert and oriented, states she was at [employer] and tripped and fell injuring her left femur/hip and also a skin tear/ hematoma on left elbow that is covered with a guaze and coban. Pt also has a colostomy bag. Pt's left leg is shortened and rotated outwards. Pt is resting comfortably with left knee slightly elevated. . Pt also has a contusion on back of head and denies loss of consciousness.

## 2021-12-12 NOTE — ED PROVIDER NOTES
EMERGENCY DEPARTMENT HISTORY AND PHYSICAL EXAM    1:37 PM  Date: 2021  Patient Name: Celia Ramos    History of Presenting Illness       History Provided By:     HPI: Celia Ramos is a 68 y.o. female with past medical history as below presents after a ground level fall on her left side and left thigh pain after [place] [employer] earlier today. Patient states that her foot got caught on carpet. Patient has a hematoma on the back of her head. Denies any LOC. Patient not on  anticoagulants. Patient denies any headache.          PCP: Hoang Schwab MD    Past History     Past Medical History:  Past Medical History:   Diagnosis Date    Abscess of left kidney 2015    Acute deep vein thrombosis of right lower extremity ([address] 3/02/2015    Anticoagulated by anticoagulation treatment 3/06/2015    On Rivaroxaban    Bell's palsy 2017    no drooping    Candidal urinary tract infection 3/12/2015    Chronic anemia     Chronic diastolic heart failure (HCC)     denies 10/2/19    Chronic pain     RT knee    Citrobacter infection 2/10/2015    Urinary tract infection    COVID-19 vaccine series completed     Decubitus ulcer of sacral region, stage 2 ([address]     Diabetes ([address]     no meds    Dyslipidemia     Glaucoma     Heart failure ([address]     Heart murmur     History of hypertension     History of kidney stones 2012    Hypertension     no medications    Hypoalbuminemia     Liver disease     non alcoholic cirrhosis    Obesity, Class II, BMI 35-39.9     Pneumothorax on right 2015    Postoperative anemia due to acute blood loss     Sepsis ([address]     Thrombocytopenia ([address]     Dr. Fiona Bowens       Past Surgical History:  Past Surgical History:   Procedure Laterality Date    HX  SECTION      HX COLOSTOMY  2015    HX LITHOTRIPSY      5 times    HX OTHER SURGICAL  1977    excision nodule thyroid    HX OTHER SURGICAL  2015    S/P CT-guided cholecystostomy tube placement (1/26/2015 - Dr. Martin Vallejo)    HX OTHER SURGICAL Left 2/16/2015    S/P CT-guided left lower renal pole abscess drainage and drainage catheter placement (2/16/2015 - Dr. Martin Vallejo)    HX OTHER SURGICAL Left 2/17/2015    S/P Percutaneous drainage of abscess via anterior abdomen (2/17/2015 - Dr. David Upton)    HX OTHER SURGICAL Right 2/27/2015    S/P Right chest tube insertion (2/27/2015 - Dr. David Upton)    HX RENAL BIOPSY Left 2/16/2015    S/P CT-guided left lower renal pole biopsy (2/16/2015 - Dr. Martin Vallejo)   [address] UNLISTED  2015    Colostomy       Family History:  Family History   Problem Relation Age of Onset    Heart Attack Father     Heart Attack Brother     Breast Cancer Paternal Aunt        Social History:  Social History     Tobacco Use    Smoking status: Never Smoker    Smokeless tobacco: Never Used   Vaping Use    Vaping Use: Never used   Substance Use Topics    Alcohol use: Never    Drug use: Never       Allergies: Allergies   Allergen Reactions    Augmentin [Amoxicillin-Pot Clavulanate] Other (comments)     Mouth sores    Cefepime Rash     Arm rashes, chest rashes    Ciprofloxacin Rash     Other reaction(s): mild rash/itching  Only w/ IV Cipro, takes PO Cipro without problem       Review of Systems   Review of Systems   Constitutional: Negative for activity change, appetite change and chills. HENT: Negative for congestion, ear discharge, ear pain and sore throat. Eyes: Negative for photophobia and pain. Respiratory: Negative for cough and choking. Cardiovascular: Negative for palpitations and leg swelling. Gastrointestinal: Negative for anal bleeding and rectal pain. Endocrine: Negative for polydipsia and polyuria. Genitourinary: Negative for genital sores and urgency. Musculoskeletal: Negative for arthralgias and myalgias. Neurological: Negative for dizziness, seizures and speech difficulty. Psychiatric/Behavioral: Negative for hallucinations, self-injury and suicidal ideas. Physical Exam     Patient Vitals for the past 12 hrs:   Temp Pulse Resp BP SpO2   12/12/21 1615 -- 86 17 (!) 161/62 98 %   12/12/21 1603 -- 91 13 (!) 165/55 98 %   12/12/21 1548 -- 99 (!) 31 (!) 148/67 97 %   12/12/21 1533 -- 80 -- -- --   12/12/21 1316 97.9 °F (36.6 °C) -- 20 (!) 163/75 97 %       Physical Exam  Vitals and nursing note reviewed. Constitutional:       Appearance: She is well-developed. HENT:      Head: Normocephalic. Comments: Hematoma posterior head  Eyes:      General:         Right eye: No discharge. Left eye: No discharge. Neck:      Comments: No midline neck tenderness  Cardiovascular:      Rate and Rhythm: Normal rate and regular rhythm. Heart sounds: Normal heart sounds. No murmur heard. Pulmonary:      Effort: Pulmonary effort is normal. No respiratory distress. Breath sounds: Normal breath sounds. No stridor. No wheezing or rales. Chest:      Chest wall: No tenderness. Abdominal:      General: Bowel sounds are normal. There is no distension. Palpations: Abdomen is soft. Tenderness: There is no abdominal tenderness. There is no guarding or rebound. Comments: colostomy   Musculoskeletal:         General: Normal range of motion. Cervical back: Normal range of motion and neck supple. Comments: Left lateral proximal femoral tenderness  Neurovascularly intact  No skin wounds   Skin:     General: Skin is warm and dry. Neurological:      Mental Status: She is alert and oriented to person, place, and time. Cranial Nerves: No cranial nerve deficit. Sensory: No sensory deficit. Motor: No weakness.          Diagnostic Study Results     Labs -  Recent Results (from the past 12 hour(s))   CBC WITH AUTOMATED DIFF    Collection Time: 12/12/21  3:10 PM   Result Value Ref Range    WBC 7.8 4.6 - 13.2 K/uL    RBC 4.75 4.20 - 5.30 M/uL HGB 13.9 12.0 - 16.0 g/dL    HCT 40.9 35.0 - 45.0 %    MCV 86.1 78.0 - 100.0 FL    MCH 29.3 24.0 - 34.0 PG    MCHC 34.0 31.0 - 37.0 g/dL    RDW 13.3 11.6 - 14.5 %    PLATELET 58 (L) 168 - 420 K/uL    MPV 11.1 9.2 - 11.8 FL    NRBC 0.0 0  WBC    ABSOLUTE NRBC 0.00 0.00 - 0.01 K/uL    NEUTROPHILS 85 (H) 40 - 73 %    LYMPHOCYTES 9 (L) 21 - 52 %    MONOCYTES 4 3 - 10 %    EOSINOPHILS 1 0 - 5 %    BASOPHILS 0 0 - 2 %    IMMATURE GRANULOCYTES 1 (H) 0.0 - 0.5 %    ABS. NEUTROPHILS 6.7 1.8 - 8.0 K/UL    ABS. LYMPHOCYTES 0.7 (L) 0.9 - 3.6 K/UL    ABS. MONOCYTES 0.3 0.05 - 1.2 K/UL    ABS. EOSINOPHILS 0.1 0.0 - 0.4 K/UL    ABS. BASOPHILS 0.0 0.0 - 0.1 K/UL    ABS. IMM. GRANS. 0.1 (H) 0.00 - 0.04 K/UL    DF AUTOMATED     METABOLIC PANEL, BASIC    Collection Time: 12/12/21  3:10 PM   Result Value Ref Range    Sodium 142 136 - 145 mmol/L    Potassium 4.3 3.5 - 5.5 mmol/L    Chloride 106 100 - 111 mmol/L    CO2 27 21 - 32 mmol/L    Anion gap 9 3.0 - 18 mmol/L    Glucose 154 (H) 74 - 99 mg/dL    BUN 24 (H) 7.0 - 18 MG/DL    Creatinine 1.02 0.6 - 1.3 MG/DL    BUN/Creatinine ratio 24 (H) 12 - 20      GFR est AA >60 >60 ml/min/1.73m2    GFR est non-AA 53 (L) >60 ml/min/1.73m2    Calcium 9.4 8.5 - 10.1 MG/DL   PROTHROMBIN TIME + INR    Collection Time: 12/12/21  3:10 PM   Result Value Ref Range    Prothrombin time 13.2 11.5 - 15.2 sec    INR 1.0 0.8 - 1.2     PTT    Collection Time: 12/12/21  3:10 PM   Result Value Ref Range    aPTT 21.1 (L) 23.0 - 36.4 SEC       Radiologic Studies -   CT HEAD WO CONT    Result Date: 12/12/2021  Tiny hyperattenuating foci in the left as above, likely represents a small bleed given traumatic setting. No significant edema. No midline shift. Perhaps short-term follow-up imaging may be helpful to evaluate for stability in 6 to 12 hours. CT SPINE CERV WO CONT    Result Date: 12/12/2021  No definite acute displaced fracture. Multinodular thyroid.  Outpatient nonemergent workup if not already obtained may be helpful as warranted. CT HIP LT WO CONT    Result Date: 12/12/2021  Left femoral neck fracture as above. Discussed CT head as well as CT hip findings with ordering provider at the time of dictation. Medical Decision Making     ED Course: Progress Notes, Reevaluation, and Consults:    1:37 PM Initial assessment performed. The patients presenting problems have been discussed, and they/their family are in agreement with the care plan formulated and outlined with them. I have encouraged them to ask questions as they arise throughout their visit. Provider Notes (Medical Decision Making):   Patient presents with ground-level fall and left thigh pain. Patient states not on anticoagulants. Denies syncope  Plan to obtain labs, imaging  Patient has head hematoma we will obtain CT head  CT head  6 x 3 mm intraparenchymal hemorrhage left frontoparietal-new from previous CT  Patient GCS 15 no neurological deficit  CT spine no displaced fracture  Left femoral neck fracture noted on CT and x-ray  Patient will be transferred to trauma [facility]  Dr. Ricky Joseph accepting physician- trauma surgeon        Critical Care:   CRITICAL CARE:    I have spent 40 minutes of critical care time involved in lab review, consultations with specialist, family decision-making, and documentation. This time does not include separately billable procedures. During this entire length of time I was immediately available to the patient. Critical Care: The reason for providing this level of medical care for this critically ill patient was due a critical illness that impaired one or more vital organ systems such that there was a high probability of imminent or life threatening deterioration in the patients condition.  This care involved high complexity decision making to assess, manipulate, and support vital system functions, to treat this degreee vital organ system failure and to prevent further life threatening deterioration of the patients condition. Vital Signs-Reviewed the patient's vital signs. Reviewed pt's pulse ox reading. Records Reviewed: old medical records  -I am the first provider for this patient.  -I reviewed the vital signs, available nursing notes, past medical history, past surgical history, family history and social history. Current Outpatient Medications   Medication Sig Dispense Refill    amLODIPine (NORVASC) 5 mg tablet Take 1 Tablet by mouth daily. 30 Tablet 5    ascorbic acid, vitamin C, (Vitamin C) 500 mg tablet Take 500 mg by mouth daily.  ferrous sulfate 324 mg (65 mg iron) tablet Take 325 mg by mouth Daily (before breakfast).  simvastatin (ZOCOR) 20 mg tablet Take 20 mg by mouth nightly.  latanoprost (XALATAN) 0.005 % ophthalmic solution Administer 1 Drop to both eyes nightly.  brimonidine-timolol (COMBIGAN) 0.2-0.5 % drop ophthalmic solution Administer 1 Drop to both eyes every twelve (12) hours.  ERGOCALCIFEROL, VITAMIN D2, (VITAMIN D2 PO) Take  by mouth every seven (7) days.  potassium chloride (K-DUR, KLOR-CON) 20 mEq tablet Take 20 mEq by mouth daily.  calcium-cholecalciferol, d3, (CALCIUM 600 + D) 600-125 mg-unit tab Take 1 Tab by mouth daily.  Biotin 2,500 mcg cap Take  by mouth daily. (Patient not taking: Reported on 12/12/2021)          Clinical Impression     Clinical Impression:   1. Closed fracture of neck of left femur, initial encounter ([facility]    2. Intracranial hemorrhage (HCC)        Disposition: This note was dictated utilizing voice recognition software which may lead to typographical errors. I apologize in advance if the situation occurs. If questions arise please do not hesitate to contact me or call our department.     Jared Tirado MD  1:37 PM

## 2021-12-12 NOTE — ED TRIAGE NOTES
Pt tripped and fell at [employer] earlier. C/o leftt hip pain.  Has hematoma and skin tear to left elbow

## 2022-04-27 ENCOUNTER — TRANSCRIBE ORDER (OUTPATIENT)
Dept: SCHEDULING | Age: 77
End: 2022-04-27

## 2022-04-27 DIAGNOSIS — Z12.31 ENCOUNTER FOR SCREENING MAMMOGRAM FOR BREAST CANCER: Primary | ICD-10-CM

## 2022-05-18 ENCOUNTER — TRANSCRIBE ORDER (OUTPATIENT)
Dept: SCHEDULING | Age: 77
End: 2022-05-18

## 2022-05-18 DIAGNOSIS — K75.81 NONALCOHOLIC STEATOHEPATITIS: Primary | ICD-10-CM

## 2022-05-23 ENCOUNTER — HOSPITAL ENCOUNTER (OUTPATIENT)
Dept: MAMMOGRAPHY | Age: 77
Discharge: HOME | End: 2022-05-23
Attending: FAMILY MEDICINE
Payer: MEDICARE

## 2022-05-23 DIAGNOSIS — Z12.31 ENCOUNTER FOR SCREENING MAMMOGRAM FOR BREAST CANCER: ICD-10-CM

## 2022-05-23 PROCEDURE — 77063 BREAST TOMOSYNTHESIS BI: CPT

## 2022-06-01 ENCOUNTER — HOSPITAL ENCOUNTER (OUTPATIENT)
Dept: ULTRASOUND IMAGING | Age: 77
Discharge: HOME | End: 2022-06-01
Attending: INTERNAL MEDICINE
Payer: MEDICARE

## 2022-06-01 DIAGNOSIS — K75.81 NONALCOHOLIC STEATOHEPATITIS: ICD-10-CM

## 2022-06-01 PROCEDURE — 76705 ECHO EXAM OF ABDOMEN: CPT

## 2022-06-21 RX ORDER — AMLODIPINE BESYLATE 5 MG/1
TABLET ORAL
Qty: 90 TABLET | Refills: 3 | Status: SHIPPED | OUTPATIENT
Start: 2022-06-21 | End: 2022-11-02 | Stop reason: SDUPTHER

## 2022-07-14 ENCOUNTER — TELEPHONE (OUTPATIENT)
Dept: CARDIOLOGY CLINIC | Age: 77
End: 2022-07-14

## 2022-07-19 ENCOUNTER — ANESTHESIA EVENT (OUTPATIENT)
Dept: ENDOSCOPY | Age: 77
End: 2022-07-19
Payer: MEDICARE

## 2022-07-20 ENCOUNTER — ANESTHESIA (OUTPATIENT)
Dept: ENDOSCOPY | Age: 77
End: 2022-07-20
Payer: MEDICARE

## 2022-07-20 ENCOUNTER — HOSPITAL ENCOUNTER (OUTPATIENT)
Age: 77
Setting detail: OUTPATIENT SURGERY
Discharge: HOME | End: 2022-07-20
Attending: INTERNAL MEDICINE | Admitting: INTERNAL MEDICINE
Payer: MEDICARE

## 2022-07-20 VITALS
DIASTOLIC BLOOD PRESSURE: 61 MMHG | HEART RATE: 73 BPM | OXYGEN SATURATION: 98 % | RESPIRATION RATE: 10 BRPM | TEMPERATURE: 97.3 F | SYSTOLIC BLOOD PRESSURE: 134 MMHG

## 2022-07-20 LAB
COVID-19 RAPID TEST, COVR: NOT DETECTED
SOURCE, COVRS: NORMAL

## 2022-07-20 PROCEDURE — 87635 SARS-COV-2 COVID-19 AMP PRB: CPT

## 2022-07-20 PROCEDURE — 77030008565 HC TBNG SUC IRR ERBE -B: Performed by: INTERNAL MEDICINE

## 2022-07-20 PROCEDURE — 76060000031 HC ANESTHESIA FIRST 0.5 HR: Performed by: INTERNAL MEDICINE

## 2022-07-20 PROCEDURE — 74011250636 HC RX REV CODE- 250/636: Performed by: NURSE ANESTHETIST, CERTIFIED REGISTERED

## 2022-07-20 PROCEDURE — 74011250636 HC RX REV CODE- 250/636: Performed by: ANESTHESIOLOGY

## 2022-07-20 PROCEDURE — 2709999900 HC NON-CHARGEABLE SUPPLY: Performed by: INTERNAL MEDICINE

## 2022-07-20 PROCEDURE — 00731 ANES UPR GI NDSC PX NOS: CPT | Performed by: ANESTHESIOLOGY

## 2022-07-20 PROCEDURE — 76040000019: Performed by: INTERNAL MEDICINE

## 2022-07-20 PROCEDURE — 74011250637 HC RX REV CODE- 250/637: Performed by: NURSE ANESTHETIST, CERTIFIED REGISTERED

## 2022-07-20 PROCEDURE — 99100 ANES PT EXTEME AGE<1 YR&>70: CPT | Performed by: ANESTHESIOLOGY

## 2022-07-20 RX ORDER — SODIUM CHLORIDE 0.9 % (FLUSH) 0.9 %
5-40 SYRINGE (ML) INJECTION AS NEEDED
Status: DISCONTINUED | OUTPATIENT
Start: 2022-07-20 | End: 2022-07-20 | Stop reason: HOSPADM

## 2022-07-20 RX ORDER — SODIUM CHLORIDE, SODIUM LACTATE, POTASSIUM CHLORIDE, CALCIUM CHLORIDE 600; 310; 30; 20 MG/100ML; MG/100ML; MG/100ML; MG/100ML
75 INJECTION, SOLUTION INTRAVENOUS CONTINUOUS
Status: CANCELLED | OUTPATIENT
Start: 2022-07-20

## 2022-07-20 RX ORDER — INSULIN LISPRO 100 [IU]/ML
INJECTION, SOLUTION INTRAVENOUS; SUBCUTANEOUS ONCE
Status: DISCONTINUED | OUTPATIENT
Start: 2022-07-20 | End: 2022-07-20 | Stop reason: HOSPADM

## 2022-07-20 RX ORDER — PROPOFOL 10 MG/ML
INJECTION, EMULSION INTRAVENOUS AS NEEDED
Status: DISCONTINUED | OUTPATIENT
Start: 2022-07-20 | End: 2022-07-20 | Stop reason: HOSPADM

## 2022-07-20 RX ORDER — SODIUM CHLORIDE, SODIUM LACTATE, POTASSIUM CHLORIDE, CALCIUM CHLORIDE 600; 310; 30; 20 MG/100ML; MG/100ML; MG/100ML; MG/100ML
25 INJECTION, SOLUTION INTRAVENOUS CONTINUOUS
Status: DISCONTINUED | OUTPATIENT
Start: 2022-07-20 | End: 2022-07-20 | Stop reason: HOSPADM

## 2022-07-20 RX ORDER — FAMOTIDINE 20 MG/1
20 TABLET, FILM COATED ORAL ONCE
Status: COMPLETED | OUTPATIENT
Start: 2022-07-20 | End: 2022-07-20

## 2022-07-20 RX ORDER — LIDOCAINE HYDROCHLORIDE 10 MG/ML
0.1 INJECTION, SOLUTION EPIDURAL; INFILTRATION; INTRACAUDAL; PERINEURAL AS NEEDED
Status: DISCONTINUED | OUTPATIENT
Start: 2022-07-20 | End: 2022-07-20 | Stop reason: HOSPADM

## 2022-07-20 RX ORDER — SODIUM CHLORIDE 0.9 % (FLUSH) 0.9 %
5-40 SYRINGE (ML) INJECTION EVERY 8 HOURS
Status: DISCONTINUED | OUTPATIENT
Start: 2022-07-20 | End: 2022-07-20 | Stop reason: HOSPADM

## 2022-07-20 RX ORDER — SODIUM CHLORIDE, SODIUM LACTATE, POTASSIUM CHLORIDE, CALCIUM CHLORIDE 600; 310; 30; 20 MG/100ML; MG/100ML; MG/100ML; MG/100ML
INJECTION, SOLUTION INTRAVENOUS
Status: DISCONTINUED | OUTPATIENT
Start: 2022-07-20 | End: 2022-07-20 | Stop reason: HOSPADM

## 2022-07-20 RX ADMIN — PROPOFOL 30 MG: 10 INJECTION, EMULSION INTRAVENOUS at 09:51

## 2022-07-20 RX ADMIN — SODIUM CHLORIDE, SODIUM LACTATE, POTASSIUM CHLORIDE, AND CALCIUM CHLORIDE: 600; 310; 30; 20 INJECTION, SOLUTION INTRAVENOUS at 09:44

## 2022-07-20 RX ADMIN — FAMOTIDINE 20 MG: 20 TABLET ORAL at 08:33

## 2022-07-20 RX ADMIN — PROPOFOL 20 MG: 10 INJECTION, EMULSION INTRAVENOUS at 09:49

## 2022-07-20 RX ADMIN — SODIUM CHLORIDE, POTASSIUM CHLORIDE, SODIUM LACTATE AND CALCIUM CHLORIDE 25 ML/HR: 600; 310; 30; 20 INJECTION, SOLUTION INTRAVENOUS at 08:30

## 2022-07-20 RX ADMIN — PROPOFOL 70 MG: 10 INJECTION, EMULSION INTRAVENOUS at 09:47

## 2022-07-20 NOTE — ANESTHESIA POSTPROCEDURE EVALUATION
Procedure(s):  UPPPER ENDOSCOPY / possible Variceal Banding. MAC    Anesthesia Post Evaluation      Multimodal analgesia: multimodal analgesia used between 6 hours prior to anesthesia start to PACU discharge  Patient location during evaluation: bedside  Patient participation: complete - patient participated  Level of consciousness: awake  Pain management: adequate  Airway patency: patent  Anesthetic complications: no  Cardiovascular status: stable  Respiratory status: acceptable  Hydration status: acceptable  Post anesthesia nausea and vomiting:  controlled      INITIAL Post-op Vital signs:   Vitals Value Taken Time   /46 07/20/22 1027   Temp 36.5 °C (97.7 °F) 07/20/22 1007   Pulse 74 07/20/22 1029   Resp 17 07/20/22 1029   SpO2 98 % 07/20/22 1029   Vitals shown include unvalidated device data.

## 2022-07-20 NOTE — ANESTHESIA PREPROCEDURE EVALUATION
Relevant Problems   No relevant active problems       Anesthetic History   No history of anesthetic complications            Review of Systems / Medical History  Patient summary reviewed and pertinent labs reviewed    Pulmonary  Within defined limits                 Neuro/Psych   Within defined limits           Cardiovascular    Hypertension: well controlled              Exercise tolerance: >4 METS     GI/Hepatic/Renal         Renal disease  Liver disease     Endo/Other        Arthritis     Other Findings              Physical Exam    Airway  Mallampati: III  TM Distance: 4 - 6 cm  Neck ROM: decreased range of motion   Mouth opening: Normal     Cardiovascular    Rhythm: regular  Rate: normal         Dental    Dentition: Full lower dentures and Full upper dentures     Pulmonary  Breath sounds clear to auscultation               Abdominal  GI exam deferred       Other Findings            Anesthetic Plan    ASA: 3  Anesthesia type: MAC            Anesthetic plan and risks discussed with: Patient

## 2022-07-20 NOTE — H&P
Chief Complaint: History of cirrhosis    History of present illness:Here for EGD possible banding of varices    PMH:   Past Medical History:   Diagnosis Date    Abscess of left kidney 2/16/2015    Acute deep vein thrombosis of right lower extremity ([address] 3/02/2015    Anticoagulated by anticoagulation treatment 3/06/2015    On Rivaroxaban    Bell's palsy 04/22/2017    no drooping    Candidal urinary tract infection 3/12/2015    Chronic anemia     Chronic diastolic heart failure ([address]     denies 10/2/19    Chronic pain     RT knee    Citrobacter infection 2/10/2015    Urinary tract infection    COVID-19 vaccine series completed     Decubitus ulcer of sacral region, stage 2 ([address] 2015    Diabetes ([address]     no meds    Dyslipidemia     Glaucoma     Heart failure (HCC)     Heart murmur 2020    History of hypertension     History of kidney stones 6/19/2012    Hypertension     no medications    Hypoalbuminemia     Liver disease     non alcoholic cirrhosis    Obesity, Class II, BMI 35-39.9     Pneumothorax on right 02/27/2015    Postoperative anemia due to acute blood loss     Sepsis ([address] 2015    Thrombocytopenia ([address]     Dr. Fran Murrieta     Allergies:    Allergies   Allergen Reactions    Augmentin [Amoxicillin-Pot Clavulanate] Other (comments)     Mouth sores    Cefepime Rash     Arm rashes, chest rashes    Ciprofloxacin Rash     Other reaction(s): mild rash/itching  Only w/ IV Cipro, takes PO Cipro without problem     Medications:   Current Facility-Administered Medications:     lidocaine (PF) (XYLOCAINE) 10 mg/mL (1 %) injection 0.1 mL, 0.1 mL, SubCUTAneous, PRN, Dominic Garcia CRNA    lactated Ringers infusion, 25 mL/hr, IntraVENous, CONTINUOUS, Dominic Garcia CRNA, Last Rate: 25 mL/hr at 07/20/22 0830, 25 mL/hr at 07/20/22 0830    sodium chloride (NS) flush 5-40 mL, 5-40 mL, IntraVENous, Q8H, Julisa Lock CRNA    sodium chloride (NS) flush 5-40 mL, 5-40 mL, IntraVENous, PRN, Dominic Garcia CRNA    insulin lispro (HUMALOG) injection, , SubCUTAneous, ONCE, Pat Estesg, CRNA  FH:   Family History   Problem Relation Age of Onset    Heart Attack Father     Heart Attack Brother     Breast Cancer Paternal Aunt      Social:   Social History     Socioeconomic History    Marital status:    Tobacco Use    Smoking status: Never    Smokeless tobacco: Never   Vaping Use    Vaping Use: Never used   Substance and Sexual Activity    Alcohol use: Never    Drug use: Never    Sexual activity: Not Currently     Surgical H:   Past Surgical History:   Procedure Laterality Date    HX  SECTION      HX COLOSTOMY  2015    HX LITHOTRIPSY      5 times    HX OTHER SURGICAL  1977    excision nodule thyroid    HX OTHER SURGICAL  2015    S/P CT-guided cholecystostomy tube placement (2015 - Dr. Julita Sebastian)    HX OTHER SURGICAL Left 2015    S/P CT-guided left lower renal pole abscess drainage and drainage catheter placement (2015 - Dr. Julita Sebastian)    HX OTHER SURGICAL Left 2015    S/P Percutaneous drainage of abscess via anterior abdomen (2015 - Dr. Kenny Lane)    HX OTHER SURGICAL Right 2015    S/P Right chest tube insertion (2015 - Dr. Kenny Lane)    HX RENAL BIOPSY Left 2015    S/P CT-guided left lower renal pole biopsy (2015 - Dr. Julita Sebastian)    ID ABDOMEN SURGERY [facility]      Colostomy       ROS: negative    Physical Exam: Visit Vitals  BP (!) 144/68   Pulse 69   Temp 98.1 °F (36.7 °C)   Resp 20   SpO2 99%     General appearance: alert, no distress  Eyes: pupils equal and reactive, extraocular eye movements intact  Nodes: No gross adenopathy in neck.   Skin: no spider angiomata, jaundice, palmar erythema   Respiratory: clear to auscultation bilaterally  Cardiovascular: regular heart rate, no murmurs, no JVD, normal rate and regular rhythm  Abdomen: soft, non-tender, liver not enlarged, spleen not palpable, no obvious ascites  Extremities: no muscle wasting, no gross arthritic changes  Neurologic: alert and oriented, cranial nerves grossly intact, no asterixis    Labs: No results found for this or any previous visit (from the past 24 hour(s)). Imp/ Plan: Will proceed with upper endoscopy with banding of varices as planned. Risk benefits alternative including but not limited to infection, bleeding, perforation of viscous, allergic reaction and resultant morbidity and mortality was discussed. Chance of missing a significant lesion due to various reasons were discussed.       Ida Higgins MD  [facility], [facility]

## 2022-07-20 NOTE — DISCHARGE INSTRUCTIONS
Upper GI Endoscopy: What to Expect at [address] had an upper GI endoscopy. Your doctor used a thin, lighted tube that bends to look at the inside of your esophagus, your stomach, and the first part of the small intestine, called the duodenum. After you have an endoscopy, you will stay at the hospital or clinic for 1 to 2 hours. This will allow the medicine to wear off. You will be able to go home after your doctor or nurse checks to make sure that you're not having any problems. You may have to stay overnight if you had treatment during the test. You may have a sore throat for a day or two after the test.  This care sheet gives you a general idea about what to expect after the test.  How can you care for yourself at home? Activity   Rest as much as you need to after you go home. You should be able to go back to your usual activities the day after the test.  Diet   Follow your doctor's directions for eating after the test.  Drink plenty of fluids (unless your doctor has told you not to). Medications   If you have a sore throat the day after the test, use an over-the-counter spray to numb your throat. Follow-up care is a key part of your treatment and safety. Be sure to make and go to all appointments, and call your doctor if you are having problems. It's also a good idea to know your test results and keep a list of the medicines you take. When should you call for help? Call 911 anytime you think you may need emergency care. For example, call if:    You passed out (lost consciousness). You have trouble breathing. You pass maroon or bloody stools. Call your doctor now or seek immediate medical care if:    You have pain that does not get better after your take pain medicine. You have new or worse belly pain. You have blood in your stools. You are sick to your stomach and cannot keep fluids down. You have a fever. You cannot pass stools or gas.    Watch closely for changes in your health, and be sure to contact your doctor if:    Your throat still hurts after a day or two. You do not get better as expected. Where can you learn more? Go to http://www.[employer].com/  Enter J454 in the search box to learn more about \"Upper GI Endoscopy: What to Expect at Home. \"  Current as of: September 8, 2021               Content Version: 13.2  © 2006-2022 [employer]. Care instructions adapted under license by [employer] (which disclaims liability or warranty for this information). If you have questions about a medical condition or this instruction, always ask your healthcare professional. [place] [address] any warranty or liability for your use of this information. Hiatal Hernia: Care Instructions  Your Care Instructions  A hiatal hernia occurs when part of the stomach bulges into the chest cavity. A hiatal hernia may allow stomach acid and juices to back up into the esophagus (acid reflux). This can cause a feeling of burning, warmth, heat, or pain behind the breastbone. This feeling may often occur after you eat, soon after you lie down, or when you bend forward, and it may come and go. You also may have a sour taste in your mouth. These symptoms are commonly known as heartburn or reflux. But not all hiatal hernias cause symptoms. Follow-up care is a key part of your treatment and safety. Be sure to make and go to all appointments, and call your doctor if you are having problems. It's also a good idea to know your test results and keep a list of the medicines you take. How can you care for yourself at home? Take your medicines exactly as prescribed. Call your doctor if you think you are having a problem with your medicine. Do not take aspirin or other nonsteroidal anti-inflammatory drugs (NSAIDs), such as ibuprofen (Advil, Motrin) or naproxen (Aleve), unless your doctor says it is okay.  Ask your doctor what you can take for pain. Your doctor may recommend over-the-counter medicine. For mild or occasional indigestion, antacids such as Tums, Gaviscon, Maalox, or Mylanta may help. Your doctor also may recommend over-the-counter acid reducers, such as famotidine (Pepcid AC), cimetidine (Tagamet HB), or omeprazole (Prilosec). Read and follow all instructions on the label. If you use these medicines often, talk with your doctor. Change your eating habits. It's best to eat several small meals instead of two or three large meals. After you eat, wait 2 to 3 hours before you lie down. Late-night snacks aren't a good idea. Avoid foods that make your symptoms worse. These may include chocolate, mint, alcohol, pepper, spicy foods, high-fat foods, or drinks with caffeine in them, such as tea, coffee, chuck, or energy drinks. If your symptoms are worse after you eat a certain food, you may want to stop eating it to see if your symptoms get better. Do not smoke or chew tobacco.  If you get heartburn at night, raise the head of your bed 6 to 8 inches by putting the frame on blocks or placing a foam wedge under the head of your mattress. (Adding extra pillows does not work.)  Do not wear tight clothing around your middle. Lose weight if you need to. Losing just 5 to 10 pounds can help. When should you call for help? Call your doctor now or seek immediate medical care if:    You have new or worse belly pain. You are vomiting. Watch closely for changes in your health, and be sure to contact your doctor if:    You have new or worse symptoms of indigestion. You have trouble or pain swallowing. You are losing weight. You do not get better as expected. Where can you learn more? Go to http://www.[employer].com/  Enter T074 in the search box to learn more about \"Hiatal Hernia: Care Instructions. \"  Current as of: September 8, 2021               Content Version: 13.2  © 7718-4733 [employer]. Care instructions adapted under license by [employer] (which disclaims liability or warranty for this information). If you have questions about a medical condition or this instruction, always ask your healthcare professional. [address] any warranty or liability for your use of this information. DISCHARGE SUMMARY from Nurse    PATIENT INSTRUCTIONS:    After general anesthesia or intravenous sedation, for 24 hours or while taking prescription Narcotics:  Limit your activities  Do not drive and operate hazardous machinery  Do not make important personal or business decisions  Do  not drink alcoholic beverages  If you have not urinated within 8 hours after discharge, please contact your surgeon on call. Report the following to your surgeon:  Excessive pain, swelling, redness or odor of or around the surgical area  Temperature over 100.5  Nausea and vomiting lasting longer than 4 hours or if unable to take medications  Any signs of decreased circulation or nerve impairment to extremity: change in color, persistent  numbness, tingling, coldness or increase pain  Any questions            These are general instructions for a healthy lifestyle:    No smoking/ No tobacco products/ Avoid exposure to second hand smoke  Surgeon General's Warning:  Quitting smoking now greatly reduces serious risk to your health. Obesity, smoking, and sedentary lifestyle greatly increases your risk for illness    A healthy diet, regular physical exercise & weight monitoring are important for maintaining a healthy lifestyle    You may be retaining fluid if you have a history of heart failure or if you experience any of the following symptoms:  Weight gain of 3 pounds or more overnight or 5 pounds in a week, increased swelling in our hands or feet or shortness of breath while lying flat in bed.   Please call your doctor as soon as you notice any of these symptoms; do not wait until your next office visit. The discharge information has been reviewed with the patient. The patient verbalized understanding. Discharge medications reviewed with the patient and appropriate educational materials and side effects teaching were provided.   ___________________________________________________________________________________________________________________________________

## 2022-07-20 NOTE — PROCEDURES
[place]  [address], [address]      Brief Procedure Note    Markus Cornejo    [id number]    Date of Procedure: 2022    Preoperative diagnosis: Esophageal varices without bleedin.1 - I85.00  Liver cirrhosis secondary to MENCHACA (nonalcoholic steatohepatitis):   K75.81  Colostomy in situ:  V44.3 - Z93.3  BMI 29.0 - 29.9, adult:  Z68.29    Postoperative diagnosis:  portal gastropathy, MENCHACA, gastric erosion, hiatal hernia, small varies    Type of Anesthesia: MAC (monitered anesthesia care)    Description of Findings: same as post op dx    Procedure: Procedure(s):  Negro Niece / possible Variceal Banding    :  Dr. Gurinder Dos Santos MD    Assistant(s): [unfilled]    Type of Anesthesia:MAC     EBL:None, no implants.     Specimens: * No specimens in log *    Findings: See printed and scanned procedure note    Complications: None    Dr. Gurinder Dos Santos MD  2022  9:58 AM

## 2022-09-02 NOTE — PERIOP NOTES
PRE-SURGICAL INSTRUCTIONS        Patient's Name:  Gregg PARSONS Date:  9/2/2022              Surgery Date:  9/8/2022                Do NOT eat or drink anything, including candy, gum, or ice chips after midnight on 9/8/2022, unless you have specific instructions from your surgeon or anesthesia provider to do so. You may brush your teeth before coming to the hospital.  No smoking 24 hours prior to the day of surgery. No alcohol 24 hours prior to the day of surgery. No recreational drugs for one week prior to the day of surgery. Leave all valuables, including money/purse, at home. Remove all jewelry, nail polish, acrylic nails, and makeup (including mascara); no lotions powders, deodorant, or perfume/cologne/after shave on the skin. Follow instruction for Hibiclens washes and CHG wipes from surgeon's office. Glasses/contact lenses and dentures may be worn to the hospital.  They will be removed prior to surgery. Call your doctor if symptoms of a cold or illness develop within 24-48 hours prior to your surgery. 11.  If you are having an outpatient procedure, please make arrangements for a responsible ADULT TO [address] [place] [address] and stay with you for 24 hours after your surgery. 12. ONE VISITOR in the hospital at this time for outpatient procedures. Exceptions may be made for surgical admissions, per nursing unit guidelines      Special Instructions:      Bring list of CURRENT medications. Bring any pertinent legal medical records. Take these medications the morning of surgery with a sip of water:  per surgeon  Follow physician instructions about stopping anticoagulants. Complete bowel prep per MD instructions. On the day of surgery, come in the main entrance of [facility]. Let the  at the desk know you are there for surgery. A staff member will come escort you to the surgical area on the second floor.     If you have any questions or concerns, please do not hesitate to call:     (Prior to the day of surgery) [facility] department:  [phone]   (Day of surgery) Pre-Op department:  [phone]    These surgical instructions were reviewed with Amber Heath  during the [facility] phone call.

## 2022-09-07 ENCOUNTER — ANESTHESIA EVENT (OUTPATIENT)
Dept: ENDOSCOPY | Age: 77
End: 2022-09-07
Payer: MEDICARE

## 2022-09-08 ENCOUNTER — ANESTHESIA (OUTPATIENT)
Dept: ENDOSCOPY | Age: 77
End: 2022-09-08
Payer: MEDICARE

## 2022-09-08 ENCOUNTER — HOSPITAL ENCOUNTER (OUTPATIENT)
Age: 77
Setting detail: OUTPATIENT SURGERY
Discharge: HOME | End: 2022-09-08
Attending: INTERNAL MEDICINE | Admitting: INTERNAL MEDICINE
Payer: MEDICARE

## 2022-09-08 VITALS
HEART RATE: 73 BPM | DIASTOLIC BLOOD PRESSURE: 50 MMHG | WEIGHT: 179 LBS | TEMPERATURE: 97.8 F | HEIGHT: 68 IN | RESPIRATION RATE: 20 BRPM | SYSTOLIC BLOOD PRESSURE: 113 MMHG | BODY MASS INDEX: 27.13 KG/M2 | OXYGEN SATURATION: 98 %

## 2022-09-08 LAB — GLUCOSE BLD STRIP.AUTO-MCNC: 112 MG/DL (ref 70–110)

## 2022-09-08 PROCEDURE — 88305 TISSUE EXAM BY PATHOLOGIST: CPT

## 2022-09-08 PROCEDURE — 77030013992 HC SNR POLYP ENDOSC BSC -B: Performed by: INTERNAL MEDICINE

## 2022-09-08 PROCEDURE — 76040000007: Performed by: INTERNAL MEDICINE

## 2022-09-08 PROCEDURE — 00811 ANES LWR INTST NDSC NOS: CPT | Performed by: NURSE ANESTHETIST, CERTIFIED REGISTERED

## 2022-09-08 PROCEDURE — 82962 GLUCOSE BLOOD TEST: CPT

## 2022-09-08 PROCEDURE — 99100 ANES PT EXTEME AGE<1 YR&>70: CPT | Performed by: ANESTHESIOLOGY

## 2022-09-08 PROCEDURE — 74011250636 HC RX REV CODE- 250/636: Performed by: NURSE ANESTHETIST, CERTIFIED REGISTERED

## 2022-09-08 PROCEDURE — 2709999900 HC NON-CHARGEABLE SUPPLY: Performed by: INTERNAL MEDICINE

## 2022-09-08 PROCEDURE — 76060000032 HC ANESTHESIA 0.5 TO 1 HR: Performed by: INTERNAL MEDICINE

## 2022-09-08 PROCEDURE — 77030021593 HC FCPS BIOP ENDOSC BSC -A: Performed by: INTERNAL MEDICINE

## 2022-09-08 PROCEDURE — 77030031670 HC DEV INFL ENDOTEK BIG60 MRTM -B: Performed by: INTERNAL MEDICINE

## 2022-09-08 PROCEDURE — 74011000250 HC RX REV CODE- 250: Performed by: NURSE ANESTHETIST, CERTIFIED REGISTERED

## 2022-09-08 PROCEDURE — C1726 CATH, BAL DIL, NON-VASCULAR: HCPCS | Performed by: INTERNAL MEDICINE

## 2022-09-08 PROCEDURE — 00811 ANES LWR INTST NDSC NOS: CPT | Performed by: ANESTHESIOLOGY

## 2022-09-08 PROCEDURE — 74011250637 HC RX REV CODE- 250/637: Performed by: NURSE ANESTHETIST, CERTIFIED REGISTERED

## 2022-09-08 PROCEDURE — 99100 ANES PT EXTEME AGE<1 YR&>70: CPT | Performed by: NURSE ANESTHETIST, CERTIFIED REGISTERED

## 2022-09-08 PROCEDURE — 77030008565 HC TBNG SUC IRR ERBE -B: Performed by: INTERNAL MEDICINE

## 2022-09-08 RX ORDER — SODIUM CHLORIDE 0.9 % (FLUSH) 0.9 %
5-40 SYRINGE (ML) INJECTION AS NEEDED
Status: DISCONTINUED | OUTPATIENT
Start: 2022-09-08 | End: 2022-09-08 | Stop reason: HOSPADM

## 2022-09-08 RX ORDER — INSULIN LISPRO 100 [IU]/ML
INJECTION, SOLUTION INTRAVENOUS; SUBCUTANEOUS ONCE
Status: DISCONTINUED | OUTPATIENT
Start: 2022-09-08 | End: 2022-09-08 | Stop reason: HOSPADM

## 2022-09-08 RX ORDER — LIDOCAINE HYDROCHLORIDE 10 MG/ML
0.1 INJECTION, SOLUTION EPIDURAL; INFILTRATION; INTRACAUDAL; PERINEURAL AS NEEDED
Status: DISCONTINUED | OUTPATIENT
Start: 2022-09-08 | End: 2022-09-08 | Stop reason: HOSPADM

## 2022-09-08 RX ORDER — SODIUM CHLORIDE, SODIUM LACTATE, POTASSIUM CHLORIDE, CALCIUM CHLORIDE 600; 310; 30; 20 MG/100ML; MG/100ML; MG/100ML; MG/100ML
25 INJECTION, SOLUTION INTRAVENOUS CONTINUOUS
Status: DISCONTINUED | OUTPATIENT
Start: 2022-09-08 | End: 2022-09-08 | Stop reason: HOSPADM

## 2022-09-08 RX ORDER — DEXTROSE MONOHYDRATE 100 MG/ML
0-250 INJECTION, SOLUTION INTRAVENOUS AS NEEDED
Status: DISCONTINUED | OUTPATIENT
Start: 2022-09-08 | End: 2022-09-08 | Stop reason: HOSPADM

## 2022-09-08 RX ORDER — FAMOTIDINE 20 MG/1
20 TABLET, FILM COATED ORAL ONCE
Status: COMPLETED | OUTPATIENT
Start: 2022-09-08 | End: 2022-09-08

## 2022-09-08 RX ORDER — LIDOCAINE HYDROCHLORIDE 20 MG/ML
INJECTION, SOLUTION EPIDURAL; INFILTRATION; INTRACAUDAL; PERINEURAL AS NEEDED
Status: DISCONTINUED | OUTPATIENT
Start: 2022-09-08 | End: 2022-09-08 | Stop reason: HOSPADM

## 2022-09-08 RX ORDER — ONDANSETRON 2 MG/ML
4 INJECTION INTRAMUSCULAR; INTRAVENOUS ONCE
Status: DISCONTINUED | OUTPATIENT
Start: 2022-09-08 | End: 2022-09-08 | Stop reason: HOSPADM

## 2022-09-08 RX ORDER — PROPOFOL 10 MG/ML
INJECTION, EMULSION INTRAVENOUS AS NEEDED
Status: DISCONTINUED | OUTPATIENT
Start: 2022-09-08 | End: 2022-09-08 | Stop reason: HOSPADM

## 2022-09-08 RX ORDER — SODIUM CHLORIDE 0.9 % (FLUSH) 0.9 %
5-40 SYRINGE (ML) INJECTION EVERY 8 HOURS
Status: DISCONTINUED | OUTPATIENT
Start: 2022-09-08 | End: 2022-09-08 | Stop reason: HOSPADM

## 2022-09-08 RX ORDER — MAGNESIUM SULFATE 100 %
4 CRYSTALS MISCELLANEOUS AS NEEDED
Status: DISCONTINUED | OUTPATIENT
Start: 2022-09-08 | End: 2022-09-08 | Stop reason: HOSPADM

## 2022-09-08 RX ADMIN — PROPOFOL 20 MG: 10 INJECTION, EMULSION INTRAVENOUS at 12:19

## 2022-09-08 RX ADMIN — FAMOTIDINE 20 MG: 20 TABLET ORAL at 10:54

## 2022-09-08 RX ADMIN — PROPOFOL 50 MG: 10 INJECTION, EMULSION INTRAVENOUS at 12:10

## 2022-09-08 RX ADMIN — PROPOFOL 20 MG: 10 INJECTION, EMULSION INTRAVENOUS at 12:22

## 2022-09-08 RX ADMIN — PROPOFOL 20 MG: 10 INJECTION, EMULSION INTRAVENOUS at 12:16

## 2022-09-08 RX ADMIN — PROPOFOL 20 MG: 10 INJECTION, EMULSION INTRAVENOUS at 12:13

## 2022-09-08 RX ADMIN — SODIUM CHLORIDE, POTASSIUM CHLORIDE, SODIUM LACTATE AND CALCIUM CHLORIDE 25 ML/HR: 600; 310; 30; 20 INJECTION, SOLUTION INTRAVENOUS at 10:50

## 2022-09-08 RX ADMIN — LIDOCAINE HYDROCHLORIDE 40 MG: 20 INJECTION, SOLUTION EPIDURAL; INFILTRATION; INTRACAUDAL; PERINEURAL at 12:10

## 2022-09-08 NOTE — PROCEDURES
[place]  [address] [place], [address]      Brief Procedure Note    Marcel Bee  [id number]  [id number]    Date of Procedure: 9/8/2022    Preoperative diagnosis: Positive colorectal cancer screening using Cologuard test [R19.5]  Colostomy status ([facility] [address] [Z93.3]    Postoperative diagnosis:  colon polyp x1    [facility]est[facility]    Description of Findings: same as post op dx    Procedure: Procedure(s):  COLONOSCOPY/ Polypectomy/ Balloon Dilation - thr colostomy. Has diverting loop colostomy. :  Dr. Tim Vasquez MD    Assistant(s): [unfilled]    Type of Anesthesia:MAC     EBL:None, no implants.     Specimens:   ID Type Source Tests Collected by Time Destination   1 : colon polyp Preservative Colon  Alex Petit MD 9/8/2022 [facility]       Findings: See printed and scanned procedure note    Complications: None    Dr. Tim Vasquez MD  9/8/2022  12:44 PM

## 2022-09-08 NOTE — H&P
Chief Complaint: Screening colonoscopy    History of present illness: No other complaints. Has cirrhosis. PMH:   Past Medical History:   Diagnosis Date    Abscess of left kidney 2/16/2015    Acute deep vein thrombosis of right lower extremity ([address] 3/02/2015    Anticoagulated by anticoagulation treatment 3/06/2015    On Rivaroxaban    Bell's palsy 04/22/2017    no drooping    Candidal urinary tract infection 3/12/2015    Chronic anemia     Chronic diastolic heart failure ([address]     denies 10/2/19    Chronic pain     RT knee    Citrobacter infection 2/10/2015    Urinary tract infection    COVID-19 vaccine series completed     Decubitus ulcer of sacral region, stage 2 ([address] 2015    Diabetes ([address]     no meds    Dyslipidemia     Glaucoma     Heart failure (HCC)     Heart murmur 2020    History of hypertension     History of kidney stones 6/19/2012    Hypertension     no medications    Hypoalbuminemia     Liver disease     non alcoholic cirrhosis    Obesity, Class II, BMI 35-39.9     Pneumothorax on right 02/27/2015    Postoperative anemia due to acute blood loss     Sepsis ([address] 2015    Thrombocytopenia ([address]     Dr. Crespo Conquest     Allergies:    Allergies   Allergen Reactions    Augmentin [Amoxicillin-Pot Clavulanate] Other (comments)     Mouth sores    Cefepime Rash     Arm rashes, chest rashes    Ciprofloxacin Rash     Other reaction(s): mild rash/itching  Only w/ IV Cipro, takes PO Cipro without problem     Medications:   Current Facility-Administered Medications:     lidocaine (PF) (XYLOCAINE) 10 mg/mL (1 %) injection 0.1 mL, 0.1 mL, SubCUTAneous, PRN, Brein, Shalonda, CRNA    lactated Ringers infusion, 25 mL/hr, IntraVENous, CONTINUOUS, Brein, Aurora, CRNA, Last Rate: 25 mL/hr at 09/08/22 1050, 25 mL/hr at 09/08/22 1050    insulin lispro (HUMALOG) injection, , SubCUTAneous, ONCE, Brein Aurora, CRNA  FH:   Family History   Problem Relation Age of Onset    Heart Attack Father     Heart Attack Brother     Breast Cancer Paternal Aunt      Social:   Social History     Socioeconomic History    Marital status:    Tobacco Use    Smoking status: Never    Smokeless tobacco: Never   Vaping Use    Vaping Use: Never used   Substance and Sexual Activity    Alcohol use: Never    Drug use: Never    Sexual activity: Not Currently     Surgical H:   Past Surgical History:   Procedure Laterality Date    HX  SECTION      HX COLOSTOMY  2015    HX LITHOTRIPSY      5 times    HX OTHER SURGICAL  1977    excision nodule thyroid    HX OTHER SURGICAL  2015    S/P CT-guided cholecystostomy tube placement (2015 - Dr. Anni Scott)    HX OTHER SURGICAL Left 2015    S/P CT-guided left lower renal pole abscess drainage and drainage catheter placement (2015 - Dr. Anni Scott)    [facility] Left 2015    S/P Percutaneous drainage of abscess via anterior abdomen (2015 - Dr. Lilia Soliz)    [facility] Right 2015    S/P Right chest tube insertion (2015 - Dr. Lilia Soliz)    HX RENAL BIOPSY Left 2015    S/P CT-guided left lower renal pole biopsy (2015 - Dr. Anni Scott)    [address]      Colostomy       ROS: negative    Physical Exam: Visit Vitals  /68   Pulse 75   Temp 98.2 °F (36.8 °C)   Resp 20   Ht 5' 8\" (1.727 m)   Wt 81.2 kg (179 lb)   SpO2 98%   BMI 27.22 kg/m²     General appearance: alert, no distress  Eyes: pupils equal and reactive, extraocular eye movements intact  Nodes: No gross adenopathy in neck.   Skin: no spider angiomata, jaundice, palmar erythema   Respiratory: clear to auscultation bilaterally  Cardiovascular: regular heart rate, no murmurs, no JVD, normal rate and regular rhythm  Abdomen: soft, non-tender, liver not enlarged, spleen not palpable, no obvious ascites  Extremities: no muscle wasting, no gross arthritic changes  Neurologic: alert and oriented, cranial nerves grossly intact, no asterixis    Labs: Recent Results (from the past 24 hour(s))   GLUCOSE, POC    Collection Time: 09/08/22 10:54 AM   Result Value Ref Range    Glucose (POC) 112 (H) 70 - 110 mg/dL         Imp/ Plan: Will proceed with colonoscopy as planned. Risk benefits alternative including but not limited to infection, bleeding, perforation of viscous, allergic reaction and resultant morbidity and mortality was discussed. Chance of missing a significant lesion due to various reasons were discussed.       Brielle Grier MD  [facility]

## 2022-09-08 NOTE — DISCHARGE INSTRUCTIONS
Colonoscopy: What to Expect at [address] [place] [address]  After a colonoscopy, you'll stay at the clinic until you wake up. Then you can go home. But you'll need to arrange for a ride. Your doctor will tell you when you can eat and do your other usual activities. Your doctor will talk to you about when you'll need your next colonoscopy. Your doctor can help you decide how often you need to be checked. This will depend on the results of your test and your risk for colorectal cancer. After the test, you may be bloated or have gas pains. You may need to pass gas. If a biopsy was done or a polyp was removed, you may have streaks of blood in your stool (feces) for a few days. Problems such as heavy rectal bleeding may not occur until several weeks after the test. This isn't common. But it can happen after polyps are removed. This care sheet gives you a general idea about how long it will take for you to recover. But each person recovers at a different pace. Follow the steps below to get better as quickly as possible. How can you care for yourself at home? Activity    Rest when you feel tired. You can do your normal activities when it feels okay to do so. Diet    Follow your doctor's directions for eating. Unless your doctor has told you not to, drink plenty of fluids. This helps to replace the fluids that were lost during the colon prep. Do not drink alcohol. Medicines    Your doctor will tell you if and when you can restart your medicines. You will also be given instructions about taking any new medicines. If you take aspirin or some other blood thinner, ask your doctor if and when to start taking it again. Make sure that you understand exactly what your doctor wants you to do. If polyps were removed or a biopsy was done during the test, your doctor may tell you not to take aspirin or other anti-inflammatory medicines for a few days. These include ibuprofen (Advil, Motrin) and naproxen (Aleve). Other instructions    For your safety, do not drive or operate machinery until the medicine wears off and you can think clearly. Your doctor may tell you not to drive or operate machinery until the day after your test.     Do not sign legal documents or make major decisions until the medicine wears off and you can think clearly. The anesthesia can make it hard for you to fully understand what you are agreeing to. Follow-up care is a key part of your treatment and safety. Be sure to make and go to all appointments, and call your doctor if you are having problems. It's also a good idea to know your test results and keep a list of the medicines you take. When should you call for help? Call 911 anytime you think you may need emergency care. For example, call if:    You passed out (lost consciousness). You pass maroon or bloody stools. You have trouble breathing. Call your doctor now or seek immediate medical care if:    You have pain that does not get better after you take pain medicine. You are sick to your stomach or cannot drink fluids. You have new or worse belly pain. You have blood in your stools. You have a fever. You cannot pass stools or gas. Watch closely for changes in your health, and be sure to contact your doctor if you have any problems. Where can you learn more? Go to http://www.gray.com/  Enter E264 in the search box to learn more about \"Colonoscopy: What to Expect at Home. \"  Current as of: September 8, 2021               Content Version: 13.2  © 2006-2022 Healthwise, Incorporated. Care instructions adapted under license by [employer] (which disclaims liability or warranty for this information). If you have questions about a medical condition or this instruction, always ask your healthcare professional. [place] [address] any warranty or liability for your use of this information.      Colon Polyps: Care Instructions  Your Care Instructions     Colon polyps are growths in the colon or the rectum. The cause of most colon polyps is not known, and most people who get them do not have any problems. But a certain kind can turn into cancer. For this reason, regular testing for colon polyps is important for people as they get older. It is also important for anyone who has an increased risk for colon cancer. Polyps are usually found through routine colon cancer screening tests. Although most colon polyps are not cancerous, they are usually removed and then tested for cancer. Screening for colon cancer saves lives because the cancer can usually be cured if it is caught early. If you have a polyp that is the type that can turn into cancer, you may need more tests to examine your entire colon. The doctor will remove any other polyps that he or she finds, and you will be tested more often. Follow-up care is a key part of your treatment and safety. Be sure to make and go to all appointments, and call your doctor if you are having problems. It's also a good idea to know your test results and keep a list of the medicines you take. How can you care for yourself at home? Regular exams to look for colon polyps are the best way to prevent polyps from turning into colon cancer. These can include stool tests, sigmoidoscopy, colonoscopy, and CT colonography. Talk with your doctor about a testing schedule that is right for you. To prevent polyps  There is no home treatment that can prevent colon polyps. But these steps may help lower your risk for cancer. Stay active. Being active can help you get to and stay at a healthy weight. Try to exercise on most days of the week. Walking is a good choice. Eat well. Choose a variety of vegetables, fruits, legumes (such as peas and beans), fish, poultry, and whole grains. Do not smoke. If you need help quitting, talk to your doctor about stop-smoking programs and medicines.  These can increase your chances of quitting for good. If you drink alcohol, limit how much you drink. Limit alcohol to 2 drinks a day for men and 1 drink a day for women. When should you call for help? Call your doctor now or seek immediate medical care if:    You have severe belly pain. Your stools are maroon or very bloody. Watch closely for changes in your health, and be sure to contact your doctor if:    You have a fever. You have nausea or vomiting. You have a change in bowel habits (new constipation or diarrhea). Your symptoms get worse or are not improving as expected. Where can you learn more? Go to http://www.hensley.com/  Enter C571 in the search box to learn more about \"Colon Polyps: Care Instructions. \"  Current as of: September 8, 2021               Content Version: 13.2  © 2006-2022 [employer]. Care instructions adapted under license by [employer] (which disclaims liability or warranty for this information). If you have questions about a medical condition or this instruction, always ask your healthcare professional. [place] [address] any warranty or liability for your use of this information. DISCHARGE SUMMARY from Nurse     POST-PROCEDURE INSTRUCTIONS:    Call your Physician if you:  Observe any excess bleeding. Develop a temperature over 100.5o F. Experience abdominal, shoulder or chest pain. Notice any signs of decreased circulation or nerve impairment to an extremity such as a change in color, persistent numbness, tingling, coldness or increase in pain. Vomit blood or you have nausea and vomiting lasting longer than 4 hours. Are unable to take medications. Are unable to urinate within 8 hours after discharge following general anesthesia or intravenous sedation.     For the next 24 hours after receiving general anesthesia or intravenous sedation, or while taking prescription Narcotics, limit your activities:  Do NOT drive a motor vehicle, operate hazard machinery or power tools, or perform tasks that require coordination. The medication you received during your procedure may have some effect on your mental awareness. Do NOT make important personal or business decisions. The medication you received during your procedure may have some effect on your mental awareness. Do NOT drink alcoholic beverages. These drinks do not mix well with the medications that have been given to you. Upon discharge from the hospital, you must be accompanied by a responsible adult. Resume your diet as directed by your physician. Resume medications as your physician has prescribed. Please give a list of your current medications to your Primary Care Provider. Please update this list whenever your medications are discontinued, doses are changed, or new medications (including over-the-counter products) are added. Please carry medication information at all times in case of emergency situations. These are general instructions for a healthy lifestyle:    No smoking/ No tobacco products/ Avoid exposure to second hand smoke. Surgeon General's Warning:  Quitting smoking now greatly reduces serious risk to your health. Obesity, smoking, and a sedentary lifestyle greatly increase your risk for illness. A healthy diet, regular physical exercise & weight monitoring are important for maintaining a healthy lifestyle  You may be retaining fluid if you have a history of heart failure or if you experience any of the following symptoms:  Weight gain of 3 pounds or more overnight or 5 pounds in a week, increased swelling in our hands or feet or shortness of breath while lying flat in bed. Please call your doctor as soon as you notice any of these symptoms; do not wait until your next office visit.     Recognize signs and symptoms of STROKE:  F  -  Face looks uneven  A  -  Arms unable to move or move unevenly  S  -  Speech slurred or non-existent  T  -  Time to call 911 - as soon as signs and symptoms begin - DO NOT go back to bed or wait to see If you get better - TIME IS BRAIN. Colorectal Screening  Colorectal cancer almost always develops from precancerous polyps (abnormal growths) in the colon or rectum. Screening tests can find precancerous polyps, so that they can be removed before they turn into cancer. Screening tests can also find colorectal cancer early, when treatment works best.  Speak with your physician about when you should begin screening and how often you should be tested. [employer] Activation    Thank you for requesting access to [facility]. Please follow the instructions below to securely access and download your online medical record. [facility] allows you to send messages to your doctor, view your test results, renew your prescriptions, schedule appointments, and more. How Do I Sign Up? In your internet browser, go to [email or web address]. Click on the First Time User? Click Here link in the Sign In box. You will see the New Member Sign Up page. Enter your [facility] Access Code exactly as it appears below. You will not need to use this code after youve completed the sign-up process. If you do not sign up before the expiration date, you must request a new code. [facility] Access Code: Activation code not generated  Current [facility] Status: Active (This is the date your [facility] access code will )    Enter the last four digits of your Social Security Number (xxxx) and Date of Birth (mm/dd/yyyy) as indicated and click Submit. You will be taken to the next sign-up page. Create a [facility] ID. This will be your [facility] login ID and cannot be changed, so think of one that is secure and easy to remember. Create a [facility] password. You can change your password at any time. Enter your Password Reset Question and Answer. This can be used at a later time if you forget your password. Enter your e-mail address. You will receive e-mail notification when new information is available in [address] Click Sign Up. You can now view and download portions of your medical record. Click the [employer] link to download a portable copy of your medical information. Additional Information    If you have questions, please call [phone]. Remember, [employer]t is NOT to be used for urgent needs. For medical emergencies, dial 911. Educational references and/or instructions provided during this visit included:    See Attached      APPOINTMENTS:    Per MD Instruction    Discharge information has been reviewed with the patient. The patient verbalized understanding.

## 2022-09-08 NOTE — ANESTHESIA POSTPROCEDURE EVALUATION
Procedure(s):  COLONOSCOPY/ Polypectomy/ Balloon Dilation. MAC    Anesthesia Post Evaluation      Multimodal analgesia: multimodal analgesia used between 6 hours prior to anesthesia start to PACU discharge  Patient location during evaluation: bedside  Patient participation: complete - patient participated  Level of consciousness: awake  Pain management: adequate  Airway patency: patent  Anesthetic complications: no  Cardiovascular status: stable  Respiratory status: acceptable  Hydration status: acceptable  Post anesthesia nausea and vomiting:  controlled  Final Post Anesthesia Temperature Assessment:  Normothermia (36.0-37.5 degrees C)      INITIAL Post-op Vital signs:   Vitals Value Taken Time   /48 09/08/22 1236   Temp 36.3 °C (97.3 °F) 09/08/22 1236   Pulse 66 09/08/22 1244   Resp 13 09/08/22 1244   SpO2 100 % 09/08/22 1244   Vitals shown include unvalidated device data.

## 2022-09-08 NOTE — ANESTHESIA PREPROCEDURE EVALUATION
Relevant Problems   RENAL FAILURE   (+) Abscess of left kidney      ENDOCRINE   (+) Type 2 diabetes mellitus (HCC)      HEMATOLOGY   (+) Chronic anemia   (+) Postoperative anemia due to acute blood loss       Anesthetic History   No history of anesthetic complications            Review of Systems / Medical History  Patient summary reviewed, nursing notes reviewed and pertinent labs reviewed    Pulmonary  Within defined limits                 Neuro/Psych   Within defined limits           Cardiovascular    Hypertension: well controlled                   GI/Hepatic/Renal           Liver disease     Endo/Other    Diabetes: well controlled, type 2         Other Findings              Physical Exam    Airway  Mallampati: II  TM Distance: 4 - 6 cm  Neck ROM: normal range of motion   Mouth opening: Normal     Cardiovascular    Rhythm: regular  Rate: normal         Dental    Dentition: Full upper dentures and Lower partial plate     Pulmonary  Breath sounds clear to auscultation               Abdominal  GI exam deferred       Other Findings            Anesthetic Plan    ASA: 2  Anesthesia type: MAC          Induction: Intravenous  Anesthetic plan and risks discussed with: Patient

## 2022-10-17 ENCOUNTER — OFFICE VISIT (OUTPATIENT)
Dept: SURGERY | Age: 77
End: 2022-10-17
Payer: MEDICARE

## 2022-10-17 VITALS
OXYGEN SATURATION: 97 % | BODY MASS INDEX: 26.66 KG/M2 | WEIGHT: 180 LBS | HEART RATE: 84 BPM | RESPIRATION RATE: 18 BRPM | DIASTOLIC BLOOD PRESSURE: 60 MMHG | SYSTOLIC BLOOD PRESSURE: 138 MMHG | HEIGHT: 69 IN | TEMPERATURE: 98 F

## 2022-10-17 DIAGNOSIS — N73.9 PELVIC ABSCESS IN FEMALE: ICD-10-CM

## 2022-10-17 DIAGNOSIS — Z43.3 ATTENTION TO COLOSTOMY (HCC): Primary | ICD-10-CM

## 2022-10-17 PROCEDURE — G8417 CALC BMI ABV UP PARAM F/U: HCPCS | Performed by: COLON & RECTAL SURGERY

## 2022-10-17 PROCEDURE — G8536 NO DOC ELDER MAL SCRN: HCPCS | Performed by: COLON & RECTAL SURGERY

## 2022-10-17 PROCEDURE — 1090F PRES/ABSN URINE INCON ASSESS: CPT | Performed by: COLON & RECTAL SURGERY

## 2022-10-17 PROCEDURE — 1101F PT FALLS ASSESS-DOCD LE1/YR: CPT | Performed by: COLON & RECTAL SURGERY

## 2022-10-17 PROCEDURE — G8432 DEP SCR NOT DOC, RNG: HCPCS | Performed by: COLON & RECTAL SURGERY

## 2022-10-17 PROCEDURE — 99205 OFFICE O/P NEW HI 60 MIN: CPT | Performed by: COLON & RECTAL SURGERY

## 2022-10-17 PROCEDURE — 1123F ACP DISCUSS/DSCN MKR DOCD: CPT | Performed by: COLON & RECTAL SURGERY

## 2022-10-17 PROCEDURE — G8428 CUR MEDS NOT DOCUMENT: HCPCS | Performed by: COLON & RECTAL SURGERY

## 2022-10-17 PROCEDURE — G8399 PT W/DXA RESULTS DOCUMENT: HCPCS | Performed by: COLON & RECTAL SURGERY

## 2022-10-17 NOTE — LETTER
10/17/2022    Patient: Clark Stearns   YOB: 1945   Date of Visit: 10/17/2022     Roxana Browning MD  [address] [address]  Via Fax: [phone]     Anya Pablo MD  [address] [facility]  [address]  [address] [facility]  [place] [place]    Dear Yue Benítez,    I saw Nati Blum in the office today regarding her transverse colostomy. She originally developed some type of pelvic or retroperitoneal infection in 2015. Ultimately Dr. Adilene Kebede performed a transverse loop colostomy as part of her management. She also had IR drainage procedure performed. She states no prior history of diverticulitis and has not had any abdominal pain since that time. She did have a colonoscopy with an excellent bowel prep in 2015 which was entirely normal and has no family history of colon cancer. Her exam in the office today is benign. Regarding the possibility of colostomy reversal I think there is a reasonable chance this can be performed. I will start by repeating CT imaging of her abdomen pelvis to look for any evidence of residual disease. If we proceed with surgery I may decide to perform on table colonoscopy prior to reversal to be sure there is no other pathology in her colon. We will also be able to see if she has any residual ascites on CT. I tell her given her cirrhosis she is at increased risk of complications after surgery, however a transverse colostomy reversal should be a fairly straightforward procedure. I will also take some time to look through all her prior imaging. It is not my impression that her infection was related to diverticulitis, however if after reviewing everything I believe she has underlying diverticulitis a colostomy reversal would include resecting her sigmoid colon. This may make matters somewhat more complicated and given her comorbidities I would need to have a very thorough discussion with her about whether or not she wishes to proceed.     If you have questions, please do not hesitate to call me. I look forward to following your patient along with you.       Sincerely,    Perry Wilks MD

## 2022-10-17 NOTE — PROGRESS NOTES
HPI: Ronen Kevin is a 68 y.o. female presenting with chief complain of attention to colostomy. The patient had a pelvic abscess in 2015. This appeared almost retroperitoneal.  Some notes describe this is possibly related to renal source or retroperitoneal source. There was also concern for diverticulitis. She had no prior history of diverticulitis before this. This was managed with IR guided drainage and ultimately is diverting loop transverse colostomy by Dr. Tyrone Sofia. She is subsequently fiber. She has underlying cirrhosis. There has been ascites on her CT imaging in the past.  Prior to her colostomy she moves her bowels 4-5 times per week. She denies chronic issues with constipation or diarrhea. She denies a history of fecal incontinence. She has no family history of colon cancer. Recent colonoscopy was aborted due to concerns at the colostomy mucosa was friable.   She also had a previous colonoscopy in 2015 which was normal.     Past Medical History:   Diagnosis Date    Abscess of left kidney 2/16/2015    Acute deep vein thrombosis of right lower extremity ([address] 3/02/2015    Anticoagulated by anticoagulation treatment 3/06/2015    On Rivaroxaban    Bell's palsy 04/22/2017    no drooping    Candidal urinary tract infection 3/12/2015    Chronic anemia     Chronic diastolic heart failure ([address]     denies 10/2/19    Chronic pain     RT knee    Citrobacter infection 2/10/2015    Urinary tract infection    COVID-19 vaccine series completed     Decubitus ulcer of sacral region, stage 2 ([address] 2015    Diabetes ([address]     no meds    Dyslipidemia     Glaucoma     Heart failure ([address]     Heart murmur 2020    History of hypertension     History of kidney stones 6/19/2012    Hypertension     no medications    Hypoalbuminemia     Liver disease     non alcoholic cirrhosis    Obesity, Class II, BMI 35-39.9     Pneumothorax on right 02/27/2015    Postoperative anemia due to acute blood loss     Sepsis ([address] 2015 Thrombocytopenia ([facility]     Dr. Faith Mcdermott       Past Surgical History:   Procedure Laterality Date    COLONOSCOPY N/A 9/8/2022    COLONOSCOPY/ Polypectomy/ Balloon Dilation performed by Wilson Sy MD at [facility]    [address]      HX COLOSTOMY  09/2015    HX LITHOTRIPSY      5 times    HX OTHER SURGICAL  8/1977    excision nodule thyroid    HX OTHER SURGICAL  1/26/2015    S/P CT-guided cholecystostomy tube placement (1/26/2015 - Dr. Jade Judge)    HX OTHER SURGICAL Left 2/16/2015    S/P CT-guided left lower renal pole abscess drainage and drainage catheter placement (2/16/2015 - Dr. Jade Judge)    HX OTHER SURGICAL Left 2/17/2015    S/P Percutaneous drainage of abscess via anterior abdomen (2/17/2015 - Dr. Gustavo Carmichael)    HX OTHER SURGICAL Right 2/27/2015    S/P Right chest tube insertion (2/27/2015 - Dr. Gustavo Carmichael)    HX RENAL BIOPSY Left 2/16/2015    S/P CT-guided left lower renal pole biopsy (2/16/2015 - Dr. Jade Judge)    [address] [facility] UNLISTED  2015    Colostomy       Family History   Problem Relation Age of Onset    Heart Attack Father     Heart Attack Brother     Breast Cancer Paternal Aunt        Social History     Socioeconomic History    Marital status:    Tobacco Use    Smoking status: Never    Smokeless tobacco: Never   Vaping Use    Vaping Use: Never used   Substance and Sexual Activity    Alcohol use: Never    Drug use: Never    Sexual activity: Not Currently       Review of Systems - Review of Systems   Constitutional: Negative. HENT: Negative. Eyes: Negative. Respiratory: Negative. Cardiovascular:  Positive for leg swelling. Negative for chest pain, palpitations, orthopnea, claudication and PND. Gastrointestinal: Negative. Genitourinary: Negative. Musculoskeletal:  Positive for joint pain. Negative for back pain, falls, myalgias and neck pain. Skin: Negative. Neurological: Negative.     Endo/Heme/Allergies:  Negative for environmental allergies and polydipsia. Bruises/bleeds easily. Psychiatric/Behavioral: Negative. Outpatient Medications Marked as Taking for the 10/17/22 encounter (Office Visit) with Denice Wood MD   Medication Sig Dispense Refill    amLODIPine (NORVASC) 5 mg tablet take 1 tablet by mouth once daily 90 Tablet 3    ascorbic acid, vitamin C, (VITAMIN C) 500 mg tablet Take 500 mg by mouth daily. calcium-cholecalciferol, d3, (CALCIUM 600 + D) 600-125 mg-unit tab Take 1 Tab by mouth daily. ferrous sulfate 324 mg (65 mg iron) tablet Take 325 mg by mouth Daily (before breakfast). simvastatin (ZOCOR) 20 mg tablet Take 20 mg by mouth nightly. latanoprost (XALATAN) 0.005 % ophthalmic solution Administer 1 Drop to both eyes nightly. brimonidine-timoloL (COMBIGAN) 0.2-0.5 % drop ophthalmic solution Administer 1 Drop to both eyes every twelve (12) hours. ERGOCALCIFEROL, VITAMIN D2, (VITAMIN D2 PO) Take  by mouth every seven (7) days. Biotin 2,500 mcg cap Take  by mouth daily. potassium chloride (K-DUR, KLOR-CON) 20 mEq tablet Take 20 mEq by mouth daily. Allergies   Allergen Reactions    Augmentin [Amoxicillin-Pot Clavulanate] Other (comments)     Mouth sores    Cefepime Rash     Arm rashes, chest rashes    Ciprofloxacin Rash     Other reaction(s): mild rash/itching  Only w/ IV Cipro, takes PO Cipro without problem       Vitals:    10/17/22 1108   BP: 138/60   Pulse: 84   Resp: 18   Temp: 98 °F (36.7 °C)   SpO2: 97%   Weight: 81.6 kg (180 lb)   Height: 5' 9\" (1.753 m)   PainSc:   0 - No pain       Physical Exam  Constitutional:       Appearance: She is well-developed. HENT:      Head: Normocephalic and atraumatic. Eyes:      Conjunctiva/sclera: Conjunctivae normal.   Abdominal:      General: There is no distension. Palpations: Abdomen is soft. Tenderness: There is no abdominal tenderness. Musculoskeletal:         General: Normal range of motion. Lymphadenopathy:      Cervical: No cervical adenopathy. Skin:     General: Skin is warm and dry. Findings: No rash. Neurological:      Sensory: No sensory deficit. Psychiatric:         Speech: Speech normal.   Digital rectal exam, no mass, some stool in the vault, minor anal canal adhesion broken away with a finger    Colonoscopy report reviewed, stenosis of the transverse colon which was dilated, procedure not complete due to concerns for mucosal abnormalities of the stoma  Case discussed with Dr. Peter Cabrales by phone, above colonoscopy specifics noted  CT imaging visualized in 2015, all images reviewed by 2 sets of images reviewed showing inflammation in the retroperitoneum of the left abdomen and pelvis, unclear if this is related to the sigmoid colon  Operative report reviewed by Dr. Silvino Chopra; open transverse loop colostomy performed in the midline    Assessment / Plan    Transverse loop colostomy for a history of abdominal abscess  Unclear to me the source of the abscess, not specifically from sigmoid colon based on my initial review of images  She has not had any further abdominal pain since that time  CT of the abdomen pelvis with p.o. and VA contrast  Follow-up in the office after this to discuss transverse loop colostomy reversal  May need cardiac clearance, she will look at the CT imaging to see if there is any significant ascites which would put her at higher surgical risk given her underlying cirrhosis  Last time coags were checked they were normal  Can consider colonoscopy at the time of her surgery versus after surgery; she did have a normal colonoscopy with an excellent prep 7 years ago    The diagnoses and plan were discussed with the patient. All questions answered. Plan of care agreed to by all concerned.

## 2022-11-02 ENCOUNTER — OFFICE VISIT (OUTPATIENT)
Dept: CARDIOLOGY CLINIC | Age: 77
End: 2022-11-02
Payer: MEDICARE

## 2022-11-02 VITALS
DIASTOLIC BLOOD PRESSURE: 68 MMHG | BODY MASS INDEX: 26.96 KG/M2 | WEIGHT: 182 LBS | SYSTOLIC BLOOD PRESSURE: 138 MMHG | OXYGEN SATURATION: 98 % | HEART RATE: 75 BPM | HEIGHT: 69 IN

## 2022-11-02 DIAGNOSIS — I35.0 AORTIC VALVE STENOSIS, ETIOLOGY OF CARDIAC VALVE DISEASE UNSPECIFIED: ICD-10-CM

## 2022-11-02 DIAGNOSIS — R00.2 PALPITATIONS: Primary | ICD-10-CM

## 2022-11-02 PROCEDURE — 1101F PT FALLS ASSESS-DOCD LE1/YR: CPT | Performed by: INTERNAL MEDICINE

## 2022-11-02 PROCEDURE — 1090F PRES/ABSN URINE INCON ASSESS: CPT | Performed by: INTERNAL MEDICINE

## 2022-11-02 PROCEDURE — G8510 SCR DEP NEG, NO PLAN REQD: HCPCS | Performed by: INTERNAL MEDICINE

## 2022-11-02 PROCEDURE — 1123F ACP DISCUSS/DSCN MKR DOCD: CPT | Performed by: INTERNAL MEDICINE

## 2022-11-02 PROCEDURE — G8536 NO DOC ELDER MAL SCRN: HCPCS | Performed by: INTERNAL MEDICINE

## 2022-11-02 PROCEDURE — G8417 CALC BMI ABV UP PARAM F/U: HCPCS | Performed by: INTERNAL MEDICINE

## 2022-11-02 PROCEDURE — G8427 DOCREV CUR MEDS BY ELIG CLIN: HCPCS | Performed by: INTERNAL MEDICINE

## 2022-11-02 PROCEDURE — G8399 PT W/DXA RESULTS DOCUMENT: HCPCS | Performed by: INTERNAL MEDICINE

## 2022-11-02 PROCEDURE — 93000 ELECTROCARDIOGRAM COMPLETE: CPT | Performed by: INTERNAL MEDICINE

## 2022-11-02 PROCEDURE — 99214 OFFICE O/P EST MOD 30 MIN: CPT | Performed by: INTERNAL MEDICINE

## 2022-11-02 RX ORDER — ERGOCALCIFEROL 1.25 MG/1
50000 CAPSULE ORAL
COMMUNITY
Start: 2022-08-29

## 2022-11-02 RX ORDER — DORZOLAMIDE HCL 20 MG/ML
1 SOLUTION/ DROPS OPHTHALMIC 2 TIMES DAILY
COMMUNITY
Start: 2022-10-31

## 2022-11-02 RX ORDER — AMLODIPINE BESYLATE 5 MG/1
5 TABLET ORAL DAILY
Qty: 90 TABLET | Refills: 3 | Status: SHIPPED | OUTPATIENT
Start: 2022-11-02

## 2022-11-02 NOTE — PROGRESS NOTES
Kate Molina    Aortic stenosis    HPI    Kate Molina is a 68 y.o. a pleasant female with no known heart disease who established with me last year in 2019 in the setting of a murmur. As you know patient has had a lot of chronic medical problems and was hospitalized a couple years ago with several complications. She apparently did have episodes of fluid overload, hypertension, and diabetes-but is not on any chronic medications for these conditions and denies any recent problems. I do know that she follows with GI- she tells me she has MENCHACA and also recently had an EGD (for varices) and was asked to start Nadolol. She is afraid to take this and comes asking me if she should. I know that coincidentally several years ago a heart murmur was noticed when she was in preop. This led to screening echocardiograms that showed aortic sclerosis without any significant stenosis. More recently the quality of her murmur changed and she had another repeat echocardiogram that finally showed some mild aortic stenosis. Please see this report below. I independently obtained and reviewed this report with the patient today. She completely denies chest discomfort, no shortness of breath she has never passed out. She can occasionally see her sock line on her legs but really does not have any problems with swelling as this is longstanding and under decent control. Despite the pandemic she tries to stay active in her house and yard and go walking.     Past Medical History:   Diagnosis Date    Abscess of left kidney 2/16/2015    Acute deep vein thrombosis of right lower extremity ([address] 3/02/2015    Anticoagulated by anticoagulation treatment 3/06/2015    On Rivaroxaban    Bell's palsy 04/22/2017    no drooping    Candidal urinary tract infection 3/12/2015    Chronic anemia     Chronic diastolic heart failure ([address]     denies 10/2/19    Chronic pain     RT knee    Citrobacter infection 2/10/2015    Urinary tract infection COVID-19 vaccine series completed     Decubitus ulcer of sacral region, stage 2 ([facility] 2015    Diabetes ([facility]     no meds    Dyslipidemia     Glaucoma     Heart failure (HCC)     Heart murmur 2020    History of hypertension     History of kidney stones 6/19/2012    Hypertension     no medications    Hypoalbuminemia     Liver disease     non alcoholic cirrhosis    Obesity, Class II, BMI 35-39.9     Pneumothorax on right 02/27/2015    Postoperative anemia due to acute blood loss     Sepsis ([facility] 2015    Thrombocytopenia ([facility]     Dr. Flo Tellez       Past Surgical History:   Procedure Laterality Date    COLONOSCOPY N/A 9/8/2022    COLONOSCOPY/ Polypectomy/ Balloon Dilation performed by Christella Sandhoff, MD at [facility]      HX COLOSTOMY  09/2015    HX LITHOTRIPSY      5 times    HX OTHER SURGICAL  8/1977    excision nodule thyroid    HX OTHER SURGICAL  1/26/2015    S/P CT-guided cholecystostomy tube placement (1/26/2015 - Dr. Ananda Ramirez)    HX OTHER SURGICAL Left 2/16/2015    S/P CT-guided left lower renal pole abscess drainage and drainage catheter placement (2/16/2015 - Dr. Ananda Ramirez)    [facility] Left 2/17/2015    S/P Percutaneous drainage of abscess via anterior abdomen (2/17/2015 - Dr. Emilio Carmona)    [facility] Right 2/27/2015    S/P Right chest tube insertion (2/27/2015 - Dr. Emilio Carmona)    HX RENAL BIOPSY Left 2/16/2015    S/P CT-guided left lower renal pole biopsy (2/16/2015 - Dr. Ananda Ramirez)    [address]  2015    Colostomy       Current Outpatient Medications   Medication Sig Dispense Refill    amLODIPine (NORVASC) 5 mg tablet take 1 tablet by mouth once daily 90 Tablet 3    ascorbic acid, vitamin C, (VITAMIN C) 500 mg tablet Take 500 mg by mouth daily. calcium-cholecalciferol, d3, (CALCIUM 600 + D) 600-125 mg-unit tab Take 1 Tab by mouth daily.       ferrous sulfate 324 mg (65 mg iron) tablet Take 325 mg by mouth Daily (before breakfast). simvastatin (ZOCOR) 20 mg tablet Take 20 mg by mouth nightly. latanoprost (XALATAN) 0.005 % ophthalmic solution Administer 1 Drop to both eyes nightly. brimonidine-timoloL (COMBIGAN) 0.2-0.5 % drop ophthalmic solution Administer 1 Drop to both eyes every twelve (12) hours. ERGOCALCIFEROL, VITAMIN D2, (VITAMIN D2 PO) Take  by mouth every seven (7) days. Biotin 2,500 mcg cap Take  by mouth daily. potassium chloride (K-DUR, KLOR-CON) 20 mEq tablet Take 20 mEq by mouth daily. Allergies   Allergen Reactions    Augmentin [Amoxicillin-Pot Clavulanate] Other (comments)     Mouth sores    Cefepime Rash     Arm rashes, chest rashes    Ciprofloxacin Rash     Other reaction(s): mild rash/itching  Only w/ IV Cipro, takes PO Cipro without problem       Social History     Socioeconomic History    Marital status:      Spouse name: Not on file    Number of children: Not on file    Years of education: Not on file    Highest education level: Not on file   Occupational History    Not on file   Tobacco Use    Smoking status: Never    Smokeless tobacco: Never   Vaping Use    Vaping Use: Never used   Substance and Sexual Activity    Alcohol use: Never    Drug use: Never    Sexual activity: Not Currently   Other Topics Concern    Not on file   Social History Narrative    Not on file     Social Determinants of Health     Financial Resource Strain: Not on file   Food Insecurity: Not on file   Transportation Needs: Not on file   Physical Activity: Not on file   Stress: Not on file   Social Connections: Not on file   Intimate Partner Violence: Not on file   Housing Stability: Not on file        FH: +CAD fathers side, but no premature CAD or SCD per se    Review of Systems    14 pt Review of Systems is negative unless otherwise mentioned in the HPI.     Wt Readings from Last 3 Encounters:   10/17/22 81.6 kg (180 lb)   09/08/22 81.2 kg (179 lb)   12/12/21 86.2 kg (190 lb)     Temp Readings from Last 3 Encounters:   10/17/22 98 °F (36.7 °C)   09/08/22 97.8 °F (36.6 °C)   07/20/22 97.3 °F (36.3 °C)     BP Readings from Last 3 Encounters:   10/17/22 138/60   09/08/22 (!) 113/50   07/20/22 134/61     Pulse Readings from Last 3 Encounters:   10/17/22 84   09/08/22 73   07/20/22 73       07/29/19   ECHO ADULT COMPLETE 07/30/2019 7/30/2019    Narrative · Left Ventricle: Normal cavity size and systolic function (ejection   fraction normal). Mildly increased wall thickness. Estimated left   ventricular ejection fraction is 56 - 60%. Biplane method used to measure   ejection fraction. Age-appropriate left ventricular diastolic function. · Aortic Valve: Mild aortic valve sclerosis with reduced excursion. Aortic   valve mean gradient is 13 mmHg. Aortic valve area is 1.5 cm2. Mild aortic   valve stenosis is present. · Mitral Valve: Mitral valve thickening. Mild mitral annular   calcification. Trace mitral valve regurgitation. Signed by: Nevin Smith DO   2015 echo had echo with aortic sclerosis but no significant stenosis at that time per review of report    Lab Results   Component Value Date/Time    Hemoglobin A1c 7.2 (H) 09/20/2015 04:00 PM     Lab Results   Component Value Date/Time    TSH 4.99 (H) 04/22/2015 12:35 AM     Lab Results   Component Value Date/Time    Cholesterol, total <50 01/30/2015 01:10 AM    HDL Cholesterol 14 (L) 01/30/2015 01:10 AM    LDL, calculated  01/30/2015 01:10 AM     Calculation not valid with this patient's other Lipid values. VLDL, calculated  01/30/2015 01:10 AM     Calculation not valid with this patient's other Lipid values. Triglyceride 53 02/22/2015 05:50 AM    CHOL/HDL Ratio CANNOT BE CALCULATED 01/30/2015 01:10 AM         Physical Exam:    Visit Vitals  Ht 5' 9\" (1.753 m)   BMI 26.58 kg/m²      Physical Exam  HENT:      Head: Normocephalic and atraumatic. Eyes:      Pupils: Pupils are equal, round, and reactive to light.    Neck:      Vascular: No JVD. Cardiovascular:      Rate and Rhythm: Normal rate and regular rhythm. Heart sounds: Murmur heard. No friction rub. No gallop. Comments: +GRIS, early peaking, radiates to carotids, upstroke WNL  Pulmonary:      Effort: Pulmonary effort is normal. No respiratory distress. Breath sounds: Normal breath sounds. No wheezing or rales. Chest:      Chest wall: No tenderness. Abdominal:      General: Bowel sounds are normal.      Palpations: Abdomen is soft. Musculoskeletal:         General: No tenderness. Skin:     General: Skin is warm and dry. Neurological:      Mental Status: She is alert and oriented to person, place, and time. EKG today shows: NSR, normal axis and intervals, no ST segment abnormalities    Impression and Plan:  Ann Morrow is a 68 y.o. with:    Mild AS, CHERELLE 1.5 cm2  HTN, controlled with Norvasc  DM2  Dyslipidemia  Esophageal varices  H/o DVT and reaction to Xarelto? Echo  RTC yearly    All questions answered. Thank you for allowing me to participate in the care of your patient, please do not hesitate to call with questions or concerns.       [address] DO

## 2022-11-02 NOTE — PROGRESS NOTES
Loly Espana presents today for   Chief Complaint   Patient presents with    Follow-up     1 year follow up       Loly Espana preferred language for health care discussion is english/other. Is someone accompanying this pt? no    Is the patient using any DME equipment during [address]? no    Depression Screening:  3 most recent PHQ Screens 11/2/2022   Little interest or pleasure in doing things Not at all   Feeling down, depressed, irritable, or hopeless Not at all   Total Score PHQ 2 0       Learning Assessment:  Learning Assessment 11/2/2022   PRIMARY LEARNER Patient   PRIMARY LANGUAGE ENGLISH   LEARNER PREFERENCE PRIMARY DEMONSTRATION   ANSWERED BY patient   RELATIONSHIP SELF       Abuse Screening:  Abuse Screening Questionnaire 11/2/2022   Do you ever feel afraid of your partner? N   Are you in a relationship with someone who physically or mentally threatens you? N   Is it safe for you to go home? Y       Fall Risk  Fall Risk Assessment, last 12 mths 11/2/2022   Able to walk? Yes   Fall in past 12 months? 0   Do you feel unsteady? 0   Are you worried about falling 0           Pt currently taking Anticoagulant therapy? no    Pt currently taking Antiplatelet therapy ? no      Coordination of Care:  1. Have you been to the ER, urgent care clinic since your last visit? Hospitalized since your last visit? yes    2. Have you seen or consulted any other health care providers outside of the [address] [place] [address] since your last visit? Include any pap smears or colon screening.  no

## 2022-11-07 ENCOUNTER — HOSPITAL ENCOUNTER (OUTPATIENT)
Dept: CT IMAGING | Age: 77
Discharge: HOME | End: 2022-11-07
Attending: COLON & RECTAL SURGERY
Payer: MEDICARE

## 2022-11-07 DIAGNOSIS — N73.9 PELVIC ABSCESS IN FEMALE: ICD-10-CM

## 2022-11-07 DIAGNOSIS — Z43.3 ATTENTION TO COLOSTOMY (HCC): ICD-10-CM

## 2022-11-07 LAB — CREAT UR-MCNC: 1.3 MG/DL (ref 0.6–1.3)

## 2022-11-07 PROCEDURE — 74177 CT ABD & PELVIS W/CONTRAST: CPT

## 2022-11-07 PROCEDURE — 74011000636 HC RX REV CODE- 636: Performed by: COLON & RECTAL SURGERY

## 2022-11-07 PROCEDURE — 82565 ASSAY OF CREATININE: CPT

## 2022-11-07 RX ADMIN — IOPAMIDOL 60 ML: 612 INJECTION, SOLUTION INTRAVENOUS at 14:51

## 2022-11-07 RX ADMIN — DIATRIZOATE MEGLUMINE AND DIATRIZOATE SODIUM 30 ML: 660; 100 LIQUID ORAL; RECTAL at 14:52

## 2022-11-21 ENCOUNTER — OFFICE VISIT (OUTPATIENT)
Dept: SURGERY | Age: 77
End: 2022-11-21
Payer: MEDICARE

## 2022-11-21 VITALS
HEART RATE: 74 BPM | DIASTOLIC BLOOD PRESSURE: 70 MMHG | SYSTOLIC BLOOD PRESSURE: 138 MMHG | TEMPERATURE: 98.4 F | BODY MASS INDEX: 26.96 KG/M2 | HEIGHT: 69 IN | OXYGEN SATURATION: 99 % | WEIGHT: 182 LBS | RESPIRATION RATE: 16 BRPM

## 2022-11-21 DIAGNOSIS — Z43.3 ATTENTION TO COLOSTOMY (HCC): Primary | ICD-10-CM

## 2022-11-21 DIAGNOSIS — N73.9 PELVIC ABSCESS IN FEMALE: ICD-10-CM

## 2022-11-21 PROCEDURE — 99214 OFFICE O/P EST MOD 30 MIN: CPT | Performed by: COLON & RECTAL SURGERY

## 2022-11-21 PROCEDURE — G8510 SCR DEP NEG, NO PLAN REQD: HCPCS | Performed by: COLON & RECTAL SURGERY

## 2022-11-21 PROCEDURE — 1123F ACP DISCUSS/DSCN MKR DOCD: CPT | Performed by: COLON & RECTAL SURGERY

## 2022-11-21 PROCEDURE — G8417 CALC BMI ABV UP PARAM F/U: HCPCS | Performed by: COLON & RECTAL SURGERY

## 2022-11-21 PROCEDURE — 1101F PT FALLS ASSESS-DOCD LE1/YR: CPT | Performed by: COLON & RECTAL SURGERY

## 2022-11-21 PROCEDURE — G8399 PT W/DXA RESULTS DOCUMENT: HCPCS | Performed by: COLON & RECTAL SURGERY

## 2022-11-21 PROCEDURE — G8428 CUR MEDS NOT DOCUMENT: HCPCS | Performed by: COLON & RECTAL SURGERY

## 2022-11-21 PROCEDURE — G8536 NO DOC ELDER MAL SCRN: HCPCS | Performed by: COLON & RECTAL SURGERY

## 2022-11-21 PROCEDURE — 1090F PRES/ABSN URINE INCON ASSESS: CPT | Performed by: COLON & RECTAL SURGERY

## 2022-11-21 RX ORDER — NEOMYCIN SULFATE 500 MG/1
1000 TABLET ORAL 3 TIMES DAILY
Qty: 6 TABLET | Refills: 0 | Status: SHIPPED | OUTPATIENT
Start: 2022-11-21 | End: 2022-11-22

## 2022-11-21 RX ORDER — METRONIDAZOLE 500 MG/1
500 TABLET ORAL 3 TIMES DAILY
Qty: 3 TABLET | Refills: 0 | Status: SHIPPED | OUTPATIENT
Start: 2022-11-21 | End: 2022-11-22

## 2022-11-21 NOTE — PROGRESS NOTES
Inocencio Garcia is a 68 y.o. female  Chief Complaint   Patient presents with    Follow-up     Discuss reversal personal history of colostomy for abdominal abscess. Visit Vitals  /70 (BP 1 Location: Left upper arm, BP Patient Position: Sitting)   Pulse 74   Temp 98.4 °F (36.9 °C) (Temporal)   Resp 16   Ht 5' 9\" (1.753 m)   Wt 182 lb (82.6 kg)   SpO2 99%   BMI 26.88 kg/m²     1. Have you been to the ER, urgent care clinic since your last visit? Hospitalized since your last visit? No    2. Have you seen or consulted any other health care providers outside of the [address] [place] [address] since your last visit? Include any pap smears or colon screening.  No

## 2022-11-21 NOTE — PROGRESS NOTES
Subjective: She is here to discuss surgery. Past medical history and ROS were reviewed and unchanged. Exam deferred    CT with p.o. and MI contrast visualized; no evidence of diverticulitis, abscess or fistula    Assessment / Plan    Status post diverting transverse loop colostomy for pelvic abscess  Unclear whether this is related to diverticular disease or renal disease, she had IR drain placed  Currently she has no evidence of diverticulitis, abscess or fistula  She has been asymptomatic  We can proceed with transverse colostomy reversal  Colonoscopy at the beginning of the procedure as this was only partially performed by GI and there was a concern for stricture in the transverse colon  I tell her she may need a more extensive procedure if there is a stricture  Be sure she has at least 50,000 platelets to proceed with surgery, she has a history of thrombocytopenia  Cardiac clearance, she has recently seen her cardiologist    30 minutes was spent in patient care. The diagnoses and plan were discussed with patient. All questions answered. Plan of care agreed to by all concerned.

## 2022-12-08 ENCOUNTER — TELEPHONE (OUTPATIENT)
Dept: CARDIOLOGY CLINIC | Age: 77
End: 2022-12-08

## 2022-12-08 NOTE — TELEPHONE ENCOUNTER
Request for cardiac clearance for Loop transverse colostomy reversal , procedure scheduled 1-24-23.     Verbal order and read back per Huxiang Senate, DO  Low to moderate risk from a cardiac standpoint   Faxed from back to [facility] surgical specialists

## 2022-12-12 ENCOUNTER — HOSPITAL ENCOUNTER (OUTPATIENT)
Dept: LAB | Age: 77
Discharge: HOME | End: 2022-12-12
Payer: MEDICARE

## 2022-12-12 DIAGNOSIS — N73.9 PELVIC ABSCESS IN FEMALE: ICD-10-CM

## 2022-12-12 DIAGNOSIS — Z43.3 ATTENTION TO COLOSTOMY (HCC): ICD-10-CM

## 2022-12-12 LAB
ANION GAP SERPL CALC-SCNC: 7 MMOL/L (ref 3–18)
APTT PPP: 22.4 SEC (ref 23–36.4)
BASOPHILS # BLD: 0 K/UL (ref 0–0.1)
BASOPHILS NFR BLD: 1 % (ref 0–2)
BUN SERPL-MCNC: 25 MG/DL (ref 7–18)
BUN/CREAT SERPL: 21 (ref 12–20)
CALCIUM SERPL-MCNC: 9.3 MG/DL (ref 8.5–10.1)
CHLORIDE SERPL-SCNC: 109 MMOL/L (ref 100–111)
CO2 SERPL-SCNC: 28 MMOL/L (ref 21–32)
CREAT SERPL-MCNC: 1.19 MG/DL (ref 0.6–1.3)
DIFFERENTIAL METHOD BLD: ABNORMAL
EOSINOPHIL # BLD: 0.1 K/UL (ref 0–0.4)
EOSINOPHIL NFR BLD: 3 % (ref 0–5)
ERYTHROCYTE [DISTWIDTH] IN BLOOD BY AUTOMATED COUNT: 13.7 % (ref 11.6–14.5)
GLUCOSE SERPL-MCNC: 111 MG/DL (ref 74–99)
HCT VFR BLD AUTO: 39.5 % (ref 35–45)
HGB BLD-MCNC: 12.7 G/DL (ref 12–16)
IMM GRANULOCYTES # BLD AUTO: 0 K/UL (ref 0–0.04)
IMM GRANULOCYTES NFR BLD AUTO: 1 % (ref 0–0.5)
INR PPP: 1.1 (ref 0.8–1.2)
LYMPHOCYTES # BLD: 0.6 K/UL (ref 0.9–3.6)
LYMPHOCYTES NFR BLD: 18 % (ref 21–52)
MCH RBC QN AUTO: 29.2 PG (ref 24–34)
MCHC RBC AUTO-ENTMCNC: 32.2 G/DL (ref 31–37)
MCV RBC AUTO: 90.8 FL (ref 78–100)
MONOCYTES # BLD: 0.2 K/UL (ref 0.05–1.2)
MONOCYTES NFR BLD: 7 % (ref 3–10)
NEUTS SEG # BLD: 2.3 K/UL (ref 1.8–8)
NEUTS SEG NFR BLD: 70 % (ref 40–73)
NRBC # BLD: 0 K/UL (ref 0–0.01)
NRBC BLD-RTO: 0 PER 100 WBC
PLATELET # BLD AUTO: 63 K/UL (ref 135–420)
PLATELET COMMENTS,PCOM: ABNORMAL
PMV BLD AUTO: 11.6 FL (ref 9.2–11.8)
POTASSIUM SERPL-SCNC: 4.2 MMOL/L (ref 3.5–5.5)
PROTHROMBIN TIME: 14.5 SEC (ref 11.5–15.2)
RBC # BLD AUTO: 4.35 M/UL (ref 4.2–5.3)
RBC MORPH BLD: ABNORMAL
SODIUM SERPL-SCNC: 144 MMOL/L (ref 136–145)
WBC # BLD AUTO: 3.2 K/UL (ref 4.6–13.2)

## 2022-12-12 PROCEDURE — 85610 PROTHROMBIN TIME: CPT

## 2022-12-12 PROCEDURE — 80048 BASIC METABOLIC PNL TOTAL CA: CPT

## 2022-12-12 PROCEDURE — 85025 COMPLETE CBC W/AUTO DIFF WBC: CPT

## 2022-12-12 PROCEDURE — 36415 COLL VENOUS BLD VENIPUNCTURE: CPT

## 2022-12-12 PROCEDURE — 85730 THROMBOPLASTIN TIME PARTIAL: CPT

## 2023-01-04 NOTE — PERIOP NOTES
Analia Sea PAT phone assessment completed on 1/4/23. The following instructions were reviewed with Analia Choi and she  verbalized understanding. Do NOT eat or drink anything, including candy, gum, or ice chips after midnight on 1/24/23, unless you have specific instructions from your surgeon or anesthesia provider to do so. You may brush your teeth before coming to the hospital.  No smoking 24 hours prior to the day of surgery. No alcohol 24 hours prior to the day of surgery. No recreational drugs for one week prior to the day of surgery. Leave all valuables, including money/purse, at home. Remove all jewelry, nail polish, acrylic nails, and makeup (including mascara); no lotions powders, deodorant, or perfume/cologne/after shave on the skin. Glasses/contact lenses and dentures may be worn to the hospital.  They will be removed prior to surgery. Call your doctor if symptoms of a cold or illness develop within 24-48 hours prior to your surgery. 10.  AN ADULT MUST DRIVE YOU HOME AFTER OUTPATIENT SURGERY. 11.  If you are having an outpatient procedure, please make arrangements for a responsible adult to be with you for 24 hours after your surgery. Special Instructions:      Bring list of CURRENT medications. .  Bring any pertinent legal medical records. Take these medications the morning of surgery with a sip of water:  per surgeon  Follow physician instructions about stopping anticoagulants. Complete bowel prep per MD instructions.

## 2023-01-10 ENCOUNTER — TELEPHONE (OUTPATIENT)
Dept: CARDIOLOGY CLINIC | Age: 78
End: 2023-01-10

## 2023-01-10 NOTE — TELEPHONE ENCOUNTER
----- Message from Patricia Sewell DO sent at 11/30/2022  2:58 PM EST -----  While her EF is normal her aortic stenosis does look worse compared to her last echo  Knowing her history, I think it would be best to meet with an experienced heart team to discuss options in the future (not saying she needs to do anything now).     If she agrees, I would refer her to [address] [place] [address] Dr. Lynn Cr for aortic stenosis    Thanks    ----- Message -----  From: Marybeth Augustin LPN  Sent: 53/16/3253   7:34 AM EST  To: Patricia Sewell, DO    Per your last note:    Mild AS, CHERELLE 1.5 cm2  HTN, controlled with Norvasc  DM2  Dyslipidemia  Esophageal varices  H/o DVT and reaction to Xarelto?     Echo  RTC yearly

## 2023-01-10 NOTE — TELEPHONE ENCOUNTER
S/W patient in regards to Echo results. Pt verbalized understanding. Patient sts she will contact Dr Christiano Roman office about the referral after she has a procedure she is going for next week.  Information for Dr Christiano Roman office g

## 2023-01-10 NOTE — TELEPHONE ENCOUNTER
----- Message from Pattricia Ganser, DO sent at 11/30/2022  2:58 PM EST -----  While her EF is normal her aortic stenosis does look worse compared to her last echo  Knowing her history, I think it would be best to meet with an experienced heart team to discuss options in the future (not saying she needs to do anything now).     If she agrees, I would refer her to [address] [place] [address] Dr. Cici Smith for aortic stenosis    Thanks    ----- Message -----  From: Diego Hurtado LPN  Sent: 81/37/3262   7:34 AM EST  To: Pattricia Ganser, DO    Per your last note:    Mild AS, CHERELLE 1.5 cm2  HTN, controlled with Norvasc  DM2  Dyslipidemia  Esophageal varices  H/o DVT and reaction to Xarelto?     Echo  RTC yearly

## 2023-01-21 ENCOUNTER — HOSPITAL ENCOUNTER (OUTPATIENT)
Dept: LAB | Age: 78
Discharge: HOME | DRG: 331 | End: 2023-01-21
Payer: MEDICARE

## 2023-01-21 DIAGNOSIS — Z43.3 ATTENTION TO COLOSTOMY (HCC): ICD-10-CM

## 2023-01-21 DIAGNOSIS — N73.9 PELVIC ABSCESS IN FEMALE: ICD-10-CM

## 2023-01-21 LAB
ABO + RH BLD: NORMAL
BLOOD GROUP ANTIBODIES SERPL: NORMAL
SPECIMEN EXP DATE BLD: NORMAL

## 2023-01-21 PROCEDURE — 86900 BLOOD TYPING SEROLOGIC ABO: CPT

## 2023-01-21 PROCEDURE — 36415 COLL VENOUS BLD VENIPUNCTURE: CPT

## 2023-01-23 ENCOUNTER — ANESTHESIA EVENT (OUTPATIENT)
Dept: SURGERY | Age: 78
DRG: 331 | End: 2023-01-23
Payer: MEDICARE

## 2023-01-24 ENCOUNTER — ANESTHESIA (OUTPATIENT)
Dept: SURGERY | Age: 78
DRG: 331 | End: 2023-01-24
Payer: MEDICARE

## 2023-01-24 ENCOUNTER — HOSPITAL ENCOUNTER (INPATIENT)
Age: 78
LOS: 4 days | Discharge: HOME HEALTH | DRG: 330 | End: 2023-01-28
Attending: COLON & RECTAL SURGERY | Admitting: COLON & RECTAL SURGERY
Payer: MEDICARE

## 2023-01-24 DIAGNOSIS — Z43.3 ATTENTION TO COLOSTOMY (HCC): Primary | ICD-10-CM

## 2023-01-24 LAB
ERYTHROCYTE [DISTWIDTH] IN BLOOD BY AUTOMATED COUNT: 13.3 % (ref 11.6–14.5)
GLUCOSE BLD STRIP.AUTO-MCNC: 111 MG/DL (ref 70–110)
GLUCOSE BLD STRIP.AUTO-MCNC: 143 MG/DL (ref 70–110)
HCT VFR BLD AUTO: 39.5 % (ref 35–45)
HGB BLD-MCNC: 13.6 G/DL (ref 12–16)
MCH RBC QN AUTO: 29.6 PG (ref 24–34)
MCHC RBC AUTO-ENTMCNC: 34.4 G/DL (ref 31–37)
MCV RBC AUTO: 86.1 FL (ref 78–100)
NRBC # BLD: 0 K/UL (ref 0–0.01)
NRBC BLD-RTO: 0 PER 100 WBC
PLATELET # BLD AUTO: 67 K/UL (ref 135–420)
PMV BLD AUTO: 10.5 FL (ref 9.2–11.8)
RBC # BLD AUTO: 4.59 M/UL (ref 4.2–5.3)
WBC # BLD AUTO: 5.5 K/UL (ref 4.6–13.2)

## 2023-01-24 PROCEDURE — 74011250637 HC RX REV CODE- 250/637: Performed by: COLON & RECTAL SURGERY

## 2023-01-24 PROCEDURE — 76010000132 HC OR TIME 2.5 TO 3 HR: Performed by: COLON & RECTAL SURGERY

## 2023-01-24 PROCEDURE — 77030036731 HC STPLR ENDOSC J&J -F: Performed by: COLON & RECTAL SURGERY

## 2023-01-24 PROCEDURE — 77030011808 HC STPLR ENDOSCOPIC J&J -D: Performed by: COLON & RECTAL SURGERY

## 2023-01-24 PROCEDURE — 74011250636 HC RX REV CODE- 250/636: Performed by: COLON & RECTAL SURGERY

## 2023-01-24 PROCEDURE — 44394 COLONOSCOPY W/SNARE: CPT | Performed by: COLON & RECTAL SURGERY

## 2023-01-24 PROCEDURE — 77030009978 HC RELD STPLR TCR J&J -B: Performed by: COLON & RECTAL SURGERY

## 2023-01-24 PROCEDURE — 2709999900 HC NON-CHARGEABLE SUPPLY: Performed by: COLON & RECTAL SURGERY

## 2023-01-24 PROCEDURE — 77030013992 HC SNR POLYP ENDOSC BSC -B: Performed by: COLON & RECTAL SURGERY

## 2023-01-24 PROCEDURE — 74011250636 HC RX REV CODE- 250/636: Performed by: ANESTHESIOLOGY

## 2023-01-24 PROCEDURE — 77030031139 HC SUT VCRL2 J&J -A: Performed by: COLON & RECTAL SURGERY

## 2023-01-24 PROCEDURE — 74011000250 HC RX REV CODE- 250: Performed by: COLON & RECTAL SURGERY

## 2023-01-24 PROCEDURE — 77030002986 HC SUT PROL J&J -A: Performed by: COLON & RECTAL SURGERY

## 2023-01-24 PROCEDURE — 77030040361 HC SLV COMPR DVT MDII -B: Performed by: COLON & RECTAL SURGERY

## 2023-01-24 PROCEDURE — 2709999900 HC NON-CHARGEABLE SUPPLY

## 2023-01-24 PROCEDURE — 0WQF0ZZ REPAIR ABDOMINAL WALL, OPEN APPROACH: ICD-10-PCS | Performed by: COLON & RECTAL SURGERY

## 2023-01-24 PROCEDURE — 88305 TISSUE EXAM BY PATHOLOGIST: CPT

## 2023-01-24 PROCEDURE — 94762 N-INVAS EAR/PLS OXIMTRY CONT: CPT

## 2023-01-24 PROCEDURE — 74011250636 HC RX REV CODE- 250/636: Performed by: NURSE ANESTHETIST, CERTIFIED REGISTERED

## 2023-01-24 PROCEDURE — 74011250637 HC RX REV CODE- 250/637: Performed by: NURSE ANESTHETIST, CERTIFIED REGISTERED

## 2023-01-24 PROCEDURE — 82962 GLUCOSE BLOOD TEST: CPT

## 2023-01-24 PROCEDURE — 77030003028 HC SUT VCRL J&J -A: Performed by: COLON & RECTAL SURGERY

## 2023-01-24 PROCEDURE — 74011000258 HC RX REV CODE- 258: Performed by: COLON & RECTAL SURGERY

## 2023-01-24 PROCEDURE — 77030002966 HC SUT PDS J&J -A: Performed by: COLON & RECTAL SURGERY

## 2023-01-24 PROCEDURE — 77030040830 HC CATH URETH FOL MDII -A: Performed by: COLON & RECTAL SURGERY

## 2023-01-24 PROCEDURE — 0DQL0ZZ REPAIR TRANSVERSE COLON, OPEN APPROACH: ICD-10-PCS | Performed by: COLON & RECTAL SURGERY

## 2023-01-24 PROCEDURE — 65270000029 HC RM PRIVATE

## 2023-01-24 PROCEDURE — 88304 TISSUE EXAM BY PATHOLOGIST: CPT

## 2023-01-24 PROCEDURE — 0DBK8ZZ EXCISION OF ASCENDING COLON, VIA NATURAL OR ARTIFICIAL OPENING ENDOSCOPIC: ICD-10-PCS | Performed by: COLON & RECTAL SURGERY

## 2023-01-24 PROCEDURE — 76060000036 HC ANESTHESIA 2.5 TO 3 HR: Performed by: COLON & RECTAL SURGERY

## 2023-01-24 PROCEDURE — 77030040922 HC BLNKT HYPOTHRM STRY -A: Performed by: COLON & RECTAL SURGERY

## 2023-01-24 PROCEDURE — 85027 COMPLETE CBC AUTOMATED: CPT

## 2023-01-24 PROCEDURE — 77030018836 HC SOL IRR NACL ICUM -A: Performed by: COLON & RECTAL SURGERY

## 2023-01-24 PROCEDURE — 44625 REPAIR BOWEL OPENING: CPT | Performed by: COLON & RECTAL SURGERY

## 2023-01-24 PROCEDURE — 76210000017 HC OR PH I REC 1.5 TO 2 HR: Performed by: COLON & RECTAL SURGERY

## 2023-01-24 PROCEDURE — 0DBH8ZZ EXCISION OF CECUM, VIA NATURAL OR ARTIFICIAL OPENING ENDOSCOPIC: ICD-10-PCS | Performed by: COLON & RECTAL SURGERY

## 2023-01-24 RX ORDER — DIPHENHYDRAMINE HYDROCHLORIDE 50 MG/ML
25 INJECTION, SOLUTION INTRAMUSCULAR; INTRAVENOUS
Status: DISCONTINUED | OUTPATIENT
Start: 2023-01-24 | End: 2023-01-28 | Stop reason: HOSPADM

## 2023-01-24 RX ORDER — NALOXONE HYDROCHLORIDE 0.4 MG/ML
0.1 INJECTION, SOLUTION INTRAMUSCULAR; INTRAVENOUS; SUBCUTANEOUS AS NEEDED
Status: DISCONTINUED | OUTPATIENT
Start: 2023-01-24 | End: 2023-01-24 | Stop reason: HOSPADM

## 2023-01-24 RX ORDER — INSULIN LISPRO 100 [IU]/ML
INJECTION, SOLUTION INTRAVENOUS; SUBCUTANEOUS ONCE
Status: DISCONTINUED | OUTPATIENT
Start: 2023-01-24 | End: 2023-01-24 | Stop reason: HOSPADM

## 2023-01-24 RX ORDER — SODIUM CHLORIDE 0.9 % (FLUSH) 0.9 %
5-40 SYRINGE (ML) INJECTION EVERY 8 HOURS
Status: DISCONTINUED | OUTPATIENT
Start: 2023-01-24 | End: 2023-01-24 | Stop reason: HOSPADM

## 2023-01-24 RX ORDER — ONDANSETRON 2 MG/ML
4 INJECTION INTRAMUSCULAR; INTRAVENOUS ONCE
Status: COMPLETED | OUTPATIENT
Start: 2023-01-24 | End: 2023-01-24

## 2023-01-24 RX ORDER — ACETAMINOPHEN 500 MG
1000 TABLET ORAL ONCE
Status: COMPLETED | OUTPATIENT
Start: 2023-01-24 | End: 2023-01-24

## 2023-01-24 RX ORDER — FAMOTIDINE 20 MG/1
20 TABLET, FILM COATED ORAL ONCE
Status: COMPLETED | OUTPATIENT
Start: 2023-01-24 | End: 2023-01-24

## 2023-01-24 RX ORDER — FENTANYL CITRATE 50 UG/ML
25 INJECTION, SOLUTION INTRAMUSCULAR; INTRAVENOUS AS NEEDED
Status: DISCONTINUED | OUTPATIENT
Start: 2023-01-24 | End: 2023-01-24 | Stop reason: HOSPADM

## 2023-01-24 RX ORDER — FENTANYL CITRATE 50 UG/ML
INJECTION, SOLUTION INTRAMUSCULAR; INTRAVENOUS AS NEEDED
Status: DISCONTINUED | OUTPATIENT
Start: 2023-01-24 | End: 2023-01-24 | Stop reason: HOSPADM

## 2023-01-24 RX ORDER — GABAPENTIN 300 MG/1
600 CAPSULE ORAL ONCE
Status: COMPLETED | OUTPATIENT
Start: 2023-01-24 | End: 2023-01-24

## 2023-01-24 RX ORDER — ONDANSETRON 2 MG/ML
INJECTION INTRAMUSCULAR; INTRAVENOUS AS NEEDED
Status: DISCONTINUED | OUTPATIENT
Start: 2023-01-24 | End: 2023-01-24 | Stop reason: HOSPADM

## 2023-01-24 RX ORDER — LATANOPROST 50 UG/ML
1 SOLUTION/ DROPS OPHTHALMIC EVERY EVENING
Status: DISCONTINUED | OUTPATIENT
Start: 2023-01-24 | End: 2023-01-28 | Stop reason: HOSPADM

## 2023-01-24 RX ORDER — DORZOLAMIDE HCL 20 MG/ML
1 SOLUTION/ DROPS OPHTHALMIC 2 TIMES DAILY
Status: DISCONTINUED | OUTPATIENT
Start: 2023-01-24 | End: 2023-01-28 | Stop reason: HOSPADM

## 2023-01-24 RX ORDER — ACETAMINOPHEN 500 MG
1000 TABLET ORAL EVERY 6 HOURS
Status: DISCONTINUED | OUTPATIENT
Start: 2023-01-24 | End: 2023-01-28 | Stop reason: HOSPADM

## 2023-01-24 RX ORDER — HEPARIN SODIUM 5000 [USP'U]/ML
5000 INJECTION, SOLUTION INTRAVENOUS; SUBCUTANEOUS EVERY 8 HOURS
Status: DISCONTINUED | OUTPATIENT
Start: 2023-01-26 | End: 2023-01-28 | Stop reason: HOSPADM

## 2023-01-24 RX ORDER — HYDROMORPHONE HYDROCHLORIDE 2 MG/ML
0.2 INJECTION, SOLUTION INTRAMUSCULAR; INTRAVENOUS; SUBCUTANEOUS
Status: DISCONTINUED | OUTPATIENT
Start: 2023-01-24 | End: 2023-01-24 | Stop reason: HOSPADM

## 2023-01-24 RX ORDER — PROPOFOL 10 MG/ML
INJECTION, EMULSION INTRAVENOUS AS NEEDED
Status: DISCONTINUED | OUTPATIENT
Start: 2023-01-24 | End: 2023-01-24 | Stop reason: HOSPADM

## 2023-01-24 RX ORDER — ONDANSETRON 2 MG/ML
4 INJECTION INTRAMUSCULAR; INTRAVENOUS
Status: DISCONTINUED | OUTPATIENT
Start: 2023-01-24 | End: 2023-01-28 | Stop reason: HOSPADM

## 2023-01-24 RX ORDER — HYDROMORPHONE HYDROCHLORIDE 2 MG/ML
INJECTION, SOLUTION INTRAMUSCULAR; INTRAVENOUS; SUBCUTANEOUS AS NEEDED
Status: DISCONTINUED | OUTPATIENT
Start: 2023-01-24 | End: 2023-01-24 | Stop reason: HOSPADM

## 2023-01-24 RX ORDER — GABAPENTIN 300 MG/1
300 CAPSULE ORAL 3 TIMES DAILY
Status: DISCONTINUED | OUTPATIENT
Start: 2023-01-24 | End: 2023-01-28 | Stop reason: HOSPADM

## 2023-01-24 RX ORDER — SODIUM CHLORIDE, SODIUM LACTATE, POTASSIUM CHLORIDE, CALCIUM CHLORIDE 600; 310; 30; 20 MG/100ML; MG/100ML; MG/100ML; MG/100ML
50 INJECTION, SOLUTION INTRAVENOUS CONTINUOUS
Status: DISCONTINUED | OUTPATIENT
Start: 2023-01-24 | End: 2023-01-24 | Stop reason: HOSPADM

## 2023-01-24 RX ORDER — SODIUM CHLORIDE, SODIUM LACTATE, POTASSIUM CHLORIDE, CALCIUM CHLORIDE 600; 310; 30; 20 MG/100ML; MG/100ML; MG/100ML; MG/100ML
INJECTION, SOLUTION INTRAVENOUS
Status: DISCONTINUED | OUTPATIENT
Start: 2023-01-24 | End: 2023-01-24 | Stop reason: HOSPADM

## 2023-01-24 RX ORDER — MORPHINE SULFATE 2 MG/ML
2 INJECTION, SOLUTION INTRAMUSCULAR; INTRAVENOUS
Status: DISCONTINUED | OUTPATIENT
Start: 2023-01-24 | End: 2023-01-28 | Stop reason: HOSPADM

## 2023-01-24 RX ORDER — AMLODIPINE BESYLATE 5 MG/1
5 TABLET ORAL DAILY
Status: DISCONTINUED | OUTPATIENT
Start: 2023-01-25 | End: 2023-01-28 | Stop reason: HOSPADM

## 2023-01-24 RX ORDER — SODIUM CHLORIDE, SODIUM LACTATE, POTASSIUM CHLORIDE, CALCIUM CHLORIDE 600; 310; 30; 20 MG/100ML; MG/100ML; MG/100ML; MG/100ML
125 INJECTION, SOLUTION INTRAVENOUS CONTINUOUS
Status: DISCONTINUED | OUTPATIENT
Start: 2023-01-24 | End: 2023-01-28 | Stop reason: HOSPADM

## 2023-01-24 RX ORDER — SUCCINYLCHOLINE CHLORIDE 20 MG/ML
INJECTION INTRAMUSCULAR; INTRAVENOUS AS NEEDED
Status: DISCONTINUED | OUTPATIENT
Start: 2023-01-24 | End: 2023-01-24 | Stop reason: HOSPADM

## 2023-01-24 RX ORDER — VECURONIUM BROMIDE FOR INJECTION 1 MG/ML
INJECTION, POWDER, LYOPHILIZED, FOR SOLUTION INTRAVENOUS AS NEEDED
Status: DISCONTINUED | OUTPATIENT
Start: 2023-01-24 | End: 2023-01-24 | Stop reason: HOSPADM

## 2023-01-24 RX ORDER — DEXAMETHASONE SODIUM PHOSPHATE 4 MG/ML
INJECTION, SOLUTION INTRA-ARTICULAR; INTRALESIONAL; INTRAMUSCULAR; INTRAVENOUS; SOFT TISSUE AS NEEDED
Status: DISCONTINUED | OUTPATIENT
Start: 2023-01-24 | End: 2023-01-24 | Stop reason: HOSPADM

## 2023-01-24 RX ORDER — SODIUM CHLORIDE 0.9 % (FLUSH) 0.9 %
5-40 SYRINGE (ML) INJECTION AS NEEDED
Status: DISCONTINUED | OUTPATIENT
Start: 2023-01-24 | End: 2023-01-24 | Stop reason: HOSPADM

## 2023-01-24 RX ADMIN — GABAPENTIN 300 MG: 300 CAPSULE ORAL at 20:54

## 2023-01-24 RX ADMIN — HYDROMORPHONE HYDROCHLORIDE 0.2 MG: 2 INJECTION, SOLUTION INTRAMUSCULAR; INTRAVENOUS; SUBCUTANEOUS at 09:29

## 2023-01-24 RX ADMIN — FAMOTIDINE 20 MG: 10 INJECTION, SOLUTION INTRAVENOUS at 20:55

## 2023-01-24 RX ADMIN — VECURONIUM BROMIDE FOR INJECTION 3 MG: 1 INJECTION, POWDER, LYOPHILIZED, FOR SOLUTION INTRAVENOUS at 07:50

## 2023-01-24 RX ADMIN — FENTANYL CITRATE 25 MCG: 50 INJECTION, SOLUTION INTRAMUSCULAR; INTRAVENOUS at 08:40

## 2023-01-24 RX ADMIN — SODIUM CHLORIDE, POTASSIUM CHLORIDE, SODIUM LACTATE AND CALCIUM CHLORIDE 125 ML/HR: 600; 310; 30; 20 INJECTION, SOLUTION INTRAVENOUS at 13:13

## 2023-01-24 RX ADMIN — FENTANYL CITRATE 25 MCG: 50 INJECTION, SOLUTION INTRAMUSCULAR; INTRAVENOUS at 11:07

## 2023-01-24 RX ADMIN — FENTANYL CITRATE 50 MCG: 50 INJECTION, SOLUTION INTRAMUSCULAR; INTRAVENOUS at 07:38

## 2023-01-24 RX ADMIN — DORZOLAMIDE HYDROCHLORIDE 1 DROP: 20 SOLUTION/ DROPS OPHTHALMIC at 21:11

## 2023-01-24 RX ADMIN — SODIUM CHLORIDE, POTASSIUM CHLORIDE, SODIUM LACTATE AND CALCIUM CHLORIDE 125 ML/HR: 600; 310; 30; 20 INJECTION, SOLUTION INTRAVENOUS at 23:55

## 2023-01-24 RX ADMIN — PROPOFOL 125 MG: 10 INJECTION, EMULSION INTRAVENOUS at 07:38

## 2023-01-24 RX ADMIN — GABAPENTIN 600 MG: 300 CAPSULE ORAL at 06:54

## 2023-01-24 RX ADMIN — AMPICILLIN SODIUM AND SULBACTAM SODIUM 3 G: 2; 1 INJECTION, POWDER, FOR SOLUTION INTRAMUSCULAR; INTRAVENOUS at 07:52

## 2023-01-24 RX ADMIN — VECURONIUM BROMIDE FOR INJECTION 2 MG: 1 INJECTION, POWDER, LYOPHILIZED, FOR SOLUTION INTRAVENOUS at 08:35

## 2023-01-24 RX ADMIN — ACETAMINOPHEN 1000 MG: 500 TABLET ORAL at 06:54

## 2023-01-24 RX ADMIN — DEXAMETHASONE SODIUM PHOSPHATE 4 MG: 4 INJECTION, SOLUTION INTRA-ARTICULAR; INTRALESIONAL; INTRAMUSCULAR; INTRAVENOUS; SOFT TISSUE at 09:46

## 2023-01-24 RX ADMIN — SUCCINYLCHOLINE CHLORIDE 100 MG: 20 INJECTION INTRAMUSCULAR; INTRAVENOUS at 07:38

## 2023-01-24 RX ADMIN — DIPHENHYDRAMINE HYDROCHLORIDE 25 MG: 50 INJECTION INTRAMUSCULAR; INTRAVENOUS at 20:54

## 2023-01-24 RX ADMIN — SODIUM CHLORIDE, POTASSIUM CHLORIDE, SODIUM LACTATE AND CALCIUM CHLORIDE 50 ML/HR: 600; 310; 30; 20 INJECTION, SOLUTION INTRAVENOUS at 06:25

## 2023-01-24 RX ADMIN — FAMOTIDINE 20 MG: 20 TABLET, FILM COATED ORAL at 06:54

## 2023-01-24 RX ADMIN — FAMOTIDINE 20 MG: 10 INJECTION, SOLUTION INTRAVENOUS at 13:27

## 2023-01-24 RX ADMIN — LATANOPROST 1 DROP: 50 SOLUTION OPHTHALMIC at 21:11

## 2023-01-24 RX ADMIN — ACETAMINOPHEN 1000 MG: 500 TABLET ORAL at 13:13

## 2023-01-24 RX ADMIN — FENTANYL CITRATE 25 MCG: 50 INJECTION, SOLUTION INTRAMUSCULAR; INTRAVENOUS at 07:58

## 2023-01-24 RX ADMIN — ONDANSETRON 4 MG: 2 INJECTION INTRAMUSCULAR; INTRAVENOUS at 09:46

## 2023-01-24 RX ADMIN — ACETAMINOPHEN 1000 MG: 500 TABLET ORAL at 20:54

## 2023-01-24 RX ADMIN — FENTANYL CITRATE 25 MCG: 50 INJECTION, SOLUTION INTRAMUSCULAR; INTRAVENOUS at 11:18

## 2023-01-24 RX ADMIN — VECURONIUM BROMIDE FOR INJECTION 1 MG: 1 INJECTION, POWDER, LYOPHILIZED, FOR SOLUTION INTRAVENOUS at 07:38

## 2023-01-24 RX ADMIN — SODIUM CHLORIDE, SODIUM LACTATE, POTASSIUM CHLORIDE, CALCIUM CHLORIDE: 600; 310; 30; 20 INJECTION, SOLUTION INTRAVENOUS at 09:44

## 2023-01-24 RX ADMIN — VECURONIUM BROMIDE FOR INJECTION 1 MG: 1 INJECTION, POWDER, LYOPHILIZED, FOR SOLUTION INTRAVENOUS at 09:24

## 2023-01-24 RX ADMIN — SODIUM CHLORIDE, SODIUM LACTATE, POTASSIUM CHLORIDE, CALCIUM CHLORIDE: 600; 310; 30; 20 INJECTION, SOLUTION INTRAVENOUS at 07:20

## 2023-01-24 RX ADMIN — SODIUM CHLORIDE, POTASSIUM CHLORIDE, SODIUM LACTATE AND CALCIUM CHLORIDE 125 ML/HR: 600; 310; 30; 20 INJECTION, SOLUTION INTRAVENOUS at 16:14

## 2023-01-24 RX ADMIN — ONDANSETRON 4 MG: 2 INJECTION INTRAMUSCULAR; INTRAVENOUS at 11:45

## 2023-01-24 NOTE — ROUTINE PROCESS
TRANSFER - OUT REPORT:    Verbal report given to Dustin Brizuela RN(name) on Nani Sanchez  being transferred to 2 Surgical (Room 2217)(unit) for routine post - op       Report consisted of patients Situation, Background, Assessment and   Recommendations(SBAR). Information from the following report(s) SBAR, Procedure Summary, MAR, and Cardiac Rhythm NSR  was reviewed with the receiving nurse. Lines:   Peripheral IV 01/24/23 Distal;Left;Posterior Forearm (Active)   Site Assessment Clean, dry, & intact 01/24/23 1144   Phlebitis Assessment 0 01/24/23 1144   Infiltration Assessment 0 01/24/23 1144   Dressing Status Clean, dry, & intact 01/24/23 1144   Dressing Type Transparent;Tape 01/24/23 1144   Hub Color/Line Status Pink; Infusing 01/24/23 1144   Action Taken Open ports on tubing capped 01/24/23 1144   Alcohol Cap Used Yes 01/24/23 1144        Opportunity for questions and clarification was provided.       Patient transported with:   [employer]

## 2023-01-24 NOTE — PROGRESS NOTES
Problem: Falls - Risk of  Goal: *Absence of Falls  Description: Document Harvey Flow Fall Risk and appropriate interventions in the flowsheet.   Outcome: Progressing Towards Goal  Note: Fall Risk Interventions:                                Problem: Patient Education: Go to Patient Education Activity  Goal: Patient/Family Education  Outcome: Progressing Towards Goal

## 2023-01-24 NOTE — ANESTHESIA POSTPROCEDURE EVALUATION
Procedure(s):  LOOP TRANSVERSE COLOSTOMY REVERSAL  COLONOSCOPY *ENDO TECH NEEDED*. general    Anesthesia Post Evaluation      Multimodal analgesia: multimodal analgesia used between 6 hours prior to anesthesia start to PACU discharge  Patient location during evaluation: PACU  Patient participation: complete - patient participated  Level of consciousness: sleepy but conscious  Pain management: adequate  Airway patency: patent  Anesthetic complications: no  Cardiovascular status: acceptable  Respiratory status: acceptable  Hydration status: acceptable  Post anesthesia nausea and vomiting:  controlled  Final Post Anesthesia Temperature Assessment:  Normothermia (36.0-37.5 degrees C)      INITIAL Post-op Vital signs:   Vitals Value Taken Time   /41 01/24/23 1034   Temp 37.1 °C (98.7 °F) 01/24/23 1030   Pulse 61 01/24/23 1040   Resp 11 01/24/23 1040   SpO2 100 % 01/24/23 1040   Vitals shown include unvalidated device data.

## 2023-01-24 NOTE — PROGRESS NOTES
TRANSFER - IN REPORT:    Verbal report received from Kirk(name) on Gregg Sink  being received from [facility]) for routine post - op      Report consisted of patients Situation, Background, Assessment and   Recommendations(SBAR). Information from the following report(s) Procedure Summary, MAR, and Recent Results was reviewed with the receiving nurse. Opportunity for questions and clarification was provided. Assessment completed upon patients arrival to unit and care assumed.

## 2023-01-24 NOTE — BRIEF OP NOTE
Brief Postoperative Note    Patient: Ren Leal  YOB: 1945  MRN: [id number]    Date of Procedure: 1/24/2023     Pre-Op Diagnosis: Z43.3 ATTENTION TO COLOSTOMY,colon stricture      Post-Op Diagnosis:  Attention to colostomy,    cecal polyp x2,  ascending colon polyp    Procedure(s):  LOOP TRANSVERSE COLOSTOMY REVERSAL  COLONOSCOPY, snare cautery polypectomy    Surgeon(s):  Caryn Martinez MD    Surgical Assistant: Surg Asst-1: Garima Wilson    Anesthesia: General     Estimated Blood Loss (mL): less than 50     Complications: None    Specimens:   ID Type Source Tests Collected by Time Destination   1 : cecum polyps Preservative   Caryn Martinez MD 1/24/2023 0815 Pathology   2 : ascending colon polyps Preservative   Caryn Martinez MD 1/24/2023 0815 Pathology   3 : transverse colostomy  Preservative   Caryn Martinez MD 1/24/2023 1015 Pathology        Implants: * No implants in log *    Drains: * No LDAs found *    Findings: polyps, stoma    [id number]    Electronically Signed by Martin Luciano MD on 1/24/2023 at 10:16 AM

## 2023-01-24 NOTE — OP NOTES
[address] [place] [address]   OPERATIVE REPORT    Name:  Sania Aparicio  MR#:   [id number]  :  1945  ACCOUNT #:  [de-identified]  DATE OF SERVICE:  2023    PREOPERATIVE DIAGNOSES:  Attention to colostomy, colon stricture. POSTOPERATIVE DIAGNOSES:  Attention to colostomy, cecal polyp x2, ascending colon polyp. PROCEDURES PERFORMED:  Colonoscopy with snare polypectomy x3, loop transverse colostomy reversal.    SURGEON:  Laurel Vieira MD    ASSISTANT:  Kina Barkley. ANESTHESIA:  General.    COMPLICATIONS:  None. SPECIMENS REMOVED:  Colon polyps and transverse colostomy to Pathology. IMPLANTS:  None. ESTIMATED BLOOD LOSS:  50 mL. FINDINGS:  Cecal and ascending colon polyps, stoma. INDICATIONS:  The patient is a 14-year-old woman who presents for colostomy reversal.  There was a question of colon stricture on incomplete colonoscopy. She was brought to the operating room for colonoscopy to rule out colon stricture as well as to take down her loop transverse colostomy. The colostomy was created for a pelvic abscess approximately 8 years ago. Recent CT did not show any residual disease. It also did not show any evidence of diverticulitis. I explained the risks to the patient including bleeding, infection, injury to surrounding structures, need for a temporary or permanent stoma and death. She understood and wished to proceed. I explained the risks of the colonoscopy as well including bleeding and injury to the intestine. She understood this as well. PROCEDURE:  The patient was properly identified in the holding area and brought to the operating room. She was laid supine on the operating room table. General anesthesia was administered. Wilder catheter was placed. We performed colonoscopy via her transverse colostomy. We first proceeded proximally to the cecum which was identified by both the ileocecal valve and appendiceal orifice.   In addition, the terminal ileum was intubated. There were two polyps in the cecum, each 5 mm in size, removed with snare cautery and retrieved for pathology. There was an additional 5-mm polyp in the ascending colon which was removed by snare cautery and sent for pathology as well. We withdrew the colonoscope until we had come through the entire ascending and transverse colon. We then placed the colonoscope per anus and advanced it to the transverse colostomy as well. There was evidence of diversion colitis of the distal transverse and proximal descending colon, but there was no evidence of stricture anywhere throughout. View was somewhat limited in the rectal area due to some mucus accumulation, but overall, the bowel prep was fair. Subsequently, the colonoscope was removed from the patient. The transverse colostomy site was suture closed with 2-0 Vicryl suture. The abdomen was prepped and draped in the usual sterile fashion. Local anesthetic was injected. We incised around the colostomy with cautery, carried this down through subcutaneous tissues and circumferentially dissected out the transverse colostomy. There were some omental attachments which needed to be transected. There was some oozing from manipulation of the omentum. This was controlled with cautery and 3-0 Vicryl ties. Of note, the patient's platelet count prior to surgery was approximately 65,000. We then finally stapled across the transverse colostomy proximally and distally with single firings of a 75-mm blue load LADY stapler, transected the mesentery after ligating it with #0 Vicryl ties and passed the specimen off the field. We created a side-to-side functional end-to-end anastomosis between the remaining limbs of the transverse colon. Common defect was closed with a TX-60 linear stapler. This staple line was oversewn. Crotch stitch was placed. The mesenteric rent was closed with 3-0 Vicryl suture in running fashion.   The anastomosis was placed back into the abdominal cavity. We spent some time at this point assuring hemostasis. The omentum was oozing in certain areas. This was also once again controlled with cautery and 3-0 Vicryl ties. We transected a small amount of parastomal hernia sac and cleared away the fascia. We closed the fascia with #1 PDS suture in running fashion. Subcutaneous tissues were irrigated. The wound was bisected with a 0-Prolene suture and packed with half-strength Betadine-soaked Kerlix. Dry sterile dressings were applied. The patient tolerated the procedure well. All instrument, sponge, and needle counts were correct at the end of the case x2. The patient awoke from anesthesia, was extubated and transported to the PACU in stable condition.       Brian Rehman MD      [id number]  D:  01/24/2023 10:24  T:  01/24/2023 16:20  JOB #:  [id number]

## 2023-01-24 NOTE — H&P
HPI: Ugo Dao is a 68 y.o. female presenting with chief complain of attention to colostomy, colon stricture    Past Medical History:   Diagnosis Date    Abscess of left kidney 2/16/2015    Acute deep vein thrombosis of right lower extremity ([address] 3/02/2015    Anticoagulated by anticoagulation treatment 3/06/2015    On Rivaroxaban    Bell's palsy 04/22/2017    no drooping    Candidal urinary tract infection 3/12/2015    Chronic anemia     Chronic diastolic heart failure ([address]     denies 10/2/19    Chronic pain     RT knee    Citrobacter infection 2/10/2015    Urinary tract infection    COVID-19 vaccine series completed     Decubitus ulcer of sacral region, stage 2 ([address] 2015    Diabetes ([address]     no meds    Dyslipidemia     Glaucoma     Heart failure (HCC)     Heart murmur 2020    History of hypertension     History of kidney stones 6/19/2012    Hypertension     no medications    Hypoalbuminemia     Liver disease     non alcoholic cirrhosis    Obesity, Class II, BMI 35-39.9     Pneumothorax on right 02/27/2015    Postoperative anemia due to acute blood loss     Sepsis ([address] 2015    Thrombocytopenia ([address]     Dr. Petra Silver       Past Surgical History:   Procedure Laterality Date    COLONOSCOPY N/A 9/8/2022    COLONOSCOPY/ Polypectomy/ Balloon Dilation performed by Charley Brand MD at [facility]      HX COLOSTOMY  09/2015    HX LITHOTRIPSY      5 times    HX OTHER SURGICAL  8/1977    excision nodule thyroid    HX OTHER SURGICAL  1/26/2015    S/P CT-guided cholecystostomy tube placement (1/26/2015 - Dr. Lexie Mccrary)    HX OTHER SURGICAL Left 2/16/2015    S/P CT-guided left lower renal pole abscess drainage and drainage catheter placement (2/16/2015 - Dr. Lexie Mccrary)    [address] Left 2/17/2015    S/P Percutaneous drainage of abscess via anterior abdomen (2/17/2015 - Dr. Urvashi Mays)    HX OTHER SURGICAL Right 2/27/2015    S/P Right chest tube insertion (2/27/2015 -  Stephanie Hartley)    HX RENAL BIOPSY Left 2/16/2015    S/P CT-guided left lower renal pole biopsy (2/16/2015 - Dr. Joaquín Harris)    [address]  2015    Colostomy       Family History   Problem Relation Age of Onset    Heart Attack Father     Heart Attack Brother     Breast Cancer Paternal Aunt        Social History     Socioeconomic History    Marital status:    Tobacco Use    Smoking status: Never    Smokeless tobacco: Never   Vaping Use    Vaping Use: Never used   Substance and Sexual Activity    Alcohol use: Never    Drug use: Never    Sexual activity: Not Currently       Outpatient Medications Marked as Taking for the 1/24/23 encounter [facility] Encounter)   Medication Sig Dispense Refill    dorzolamide (TRUSOPT) 2 % ophthalmic solution Administer 1 Drop to both eyes two (2) times a day. Vitamin D2 1,250 mcg (50,000 unit) capsule Take 50,000 Units by mouth every seven (7) days. amLODIPine (NORVASC) 5 mg tablet Take 1 Tablet by mouth daily. 90 Tablet 3    ascorbic acid, vitamin C, (VITAMIN C) 500 mg tablet Take 500 mg by mouth daily. calcium-cholecalciferol, d3, (CALCIUM 600 + D) 600-125 mg-unit tab Take 1 Tab by mouth daily. ferrous sulfate 324 mg (65 mg iron) tablet Take 325 mg by mouth Daily (before breakfast). simvastatin (ZOCOR) 20 mg tablet Take 20 mg by mouth nightly. latanoprost (XALATAN) 0.005 % ophthalmic solution Administer 1 Drop to both eyes nightly. Biotin 2,500 mcg cap Take  by mouth daily. potassium chloride (K-DUR, KLOR-CON) 20 mEq tablet Take 20 mEq by mouth daily.          Allergies   Allergen Reactions    Augmentin [Amoxicillin-Pot Clavulanate] Other (comments)     Mouth sores    Cefepime Rash     Arm rashes, chest rashes    Ciprofloxacin Rash     Other reaction(s): mild rash/itching  Only w/ IV Cipro, takes PO Cipro without problem       Vitals:    01/04/23 1343 01/24/23 0634   BP:  (!) 147/68   Pulse:  82 Resp:  18   Temp:  97.9 °F (36.6 °C)   SpO2:  98%   Weight: 82.6 kg (182 lb) 78 kg (172 lb)   Height: 5' 9\" (1.753 m) 5' 9\" (1.753 m)       Physical Exam  Constitutional:       Appearance: She is well-developed. HENT:      Head: Normocephalic and atraumatic. Eyes:      Conjunctiva/sclera: Conjunctivae normal.   Abdominal:      General: There is no distension. Palpations: Abdomen is soft. Tenderness: There is no abdominal tenderness. Musculoskeletal:         General: Normal range of motion. Lymphadenopathy:      Cervical: No cervical adenopathy. Skin:     General: Skin is warm and dry. Findings: No rash. Neurological:      Sensory: No sensory deficit. Psychiatric:         Speech: Speech normal.       Assessment / Plan    Loop colostomy reversal  Colonoscopy via stoma    The diagnoses and plan were discussed with the patient. All questions answered. Plan of care agreed to by all concerned.

## 2023-01-24 NOTE — PERIOP NOTES
Spoke with pharmacist ,0840 ampicillin ok for pt.  & patient stated she may have rash & itching from amoxicillin, Dr. Susan Ventura will be notified, Dr. Moriah Posey aware

## 2023-01-24 NOTE — ANESTHESIA PREPROCEDURE EVALUATION
Relevant Problems   No relevant active problems       Anesthetic History   No history of anesthetic complications            Review of Systems / Medical History  Patient summary reviewed, nursing notes reviewed and pertinent labs reviewed    Pulmonary  Within defined limits                 Neuro/Psych   Within defined limits           Cardiovascular    Hypertension                Comments: Mod AS   GI/Hepatic/Renal           Liver disease     Endo/Other    Diabetes: type 2         Other Findings   Comments: Plt 67, Dr. Moraes Filter aware and understands, he wishes to proceed and does not anticipate much EBL.  Pt is chronically thrombocytopenic, and has tolerated prior procedures with similar plt count         Physical Exam    Airway  Mallampati: II  TM Distance: 4 - 6 cm  Neck ROM: normal range of motion   Mouth opening: Normal     Cardiovascular  Regular rate and rhythm,  S1 and S2 normal,  no murmur, click, rub, or gallop  Rhythm: regular  Rate: normal         Dental    Dentition: Poor dentition     Pulmonary  Breath sounds clear to auscultation               Abdominal  GI exam deferred       Other Findings            Anesthetic Plan    ASA: 3  Anesthesia type: general          Induction: Intravenous  Anesthetic plan and risks discussed with: Patient

## 2023-01-24 NOTE — PROGRESS NOTES
01/24/23 1607   Urinary Catheter 01/24/23   Placement Date/Time: 01/24/23 1200     Indications for Use Perioperative use for selected surgical procedures   Status Patent;Draining   Site Condition No abnormalities   Catheter Care Completed Yes   Drainage Tube Clipped to Bed Yes   Catheter Secured to Thigh Yes   Tamper Seal Intact Yes   Bag Below Bladder/Not on Floor Yes   Lack of Dependent Loop in Tubing Yes   Drainage Bag Less Than Half Full Yes   Sterile Solution Used for  Irrigation N/A   Urine Output (mL) 75 ml   Discontinuation Reason Per provider order

## 2023-01-25 LAB
ANION GAP SERPL CALC-SCNC: 12 MMOL/L (ref 3–18)
BUN SERPL-MCNC: 31 MG/DL (ref 7–18)
BUN/CREAT SERPL: 20 (ref 12–20)
CALCIUM SERPL-MCNC: 7.9 MG/DL (ref 8.5–10.1)
CHLORIDE SERPL-SCNC: 106 MMOL/L (ref 100–111)
CO2 SERPL-SCNC: 20 MMOL/L (ref 21–32)
CREAT SERPL-MCNC: 1.57 MG/DL (ref 0.6–1.3)
ERYTHROCYTE [DISTWIDTH] IN BLOOD BY AUTOMATED COUNT: 13.7 % (ref 11.6–14.5)
GLUCOSE SERPL-MCNC: 101 MG/DL (ref 74–99)
HCT VFR BLD AUTO: 35.3 % (ref 35–45)
HGB BLD-MCNC: 11.7 G/DL (ref 12–16)
MAGNESIUM SERPL-MCNC: 1.5 MG/DL (ref 1.6–2.6)
MCH RBC QN AUTO: 29.1 PG (ref 24–34)
MCHC RBC AUTO-ENTMCNC: 33.1 G/DL (ref 31–37)
MCV RBC AUTO: 87.8 FL (ref 78–100)
NRBC # BLD: 0 K/UL (ref 0–0.01)
NRBC BLD-RTO: 0 PER 100 WBC
PHOSPHATE SERPL-MCNC: 5.2 MG/DL (ref 2.5–4.9)
PLATELET # BLD AUTO: 50 K/UL (ref 135–420)
PMV BLD AUTO: 11.6 FL (ref 9.2–11.8)
POTASSIUM SERPL-SCNC: 3.7 MMOL/L (ref 3.5–5.5)
RBC # BLD AUTO: 4.02 M/UL (ref 4.2–5.3)
SODIUM SERPL-SCNC: 138 MMOL/L (ref 136–145)
WBC # BLD AUTO: 14.8 K/UL (ref 4.6–13.2)

## 2023-01-25 PROCEDURE — 80048 BASIC METABOLIC PNL TOTAL CA: CPT

## 2023-01-25 PROCEDURE — 99024 POSTOP FOLLOW-UP VISIT: CPT | Performed by: COLON & RECTAL SURGERY

## 2023-01-25 PROCEDURE — 65270000029 HC RM PRIVATE

## 2023-01-25 PROCEDURE — 83735 ASSAY OF MAGNESIUM: CPT

## 2023-01-25 PROCEDURE — 85027 COMPLETE CBC AUTOMATED: CPT

## 2023-01-25 PROCEDURE — 36415 COLL VENOUS BLD VENIPUNCTURE: CPT

## 2023-01-25 PROCEDURE — 74011250637 HC RX REV CODE- 250/637: Performed by: COLON & RECTAL SURGERY

## 2023-01-25 PROCEDURE — 74011250636 HC RX REV CODE- 250/636: Performed by: COLON & RECTAL SURGERY

## 2023-01-25 PROCEDURE — 84100 ASSAY OF PHOSPHORUS: CPT

## 2023-01-25 PROCEDURE — 74011000250 HC RX REV CODE- 250: Performed by: COLON & RECTAL SURGERY

## 2023-01-25 RX ADMIN — ACETAMINOPHEN 1000 MG: 500 TABLET ORAL at 23:29

## 2023-01-25 RX ADMIN — FAMOTIDINE 20 MG: 10 INJECTION, SOLUTION INTRAVENOUS at 21:48

## 2023-01-25 RX ADMIN — AMLODIPINE BESYLATE 5 MG: 5 TABLET ORAL at 08:51

## 2023-01-25 RX ADMIN — ACETAMINOPHEN 1000 MG: 500 TABLET ORAL at 14:07

## 2023-01-25 RX ADMIN — GABAPENTIN 300 MG: 300 CAPSULE ORAL at 08:51

## 2023-01-25 RX ADMIN — GABAPENTIN 300 MG: 300 CAPSULE ORAL at 18:10

## 2023-01-25 RX ADMIN — GABAPENTIN 300 MG: 300 CAPSULE ORAL at 21:48

## 2023-01-25 RX ADMIN — DORZOLAMIDE HYDROCHLORIDE 1 DROP: 20 SOLUTION/ DROPS OPHTHALMIC at 22:26

## 2023-01-25 RX ADMIN — DORZOLAMIDE HYDROCHLORIDE 1 DROP: 20 SOLUTION/ DROPS OPHTHALMIC at 09:00

## 2023-01-25 RX ADMIN — FAMOTIDINE 20 MG: 10 INJECTION, SOLUTION INTRAVENOUS at 08:51

## 2023-01-25 NOTE — PROGRESS NOTES
conducted an initial consultation and Spiritual Assessment for Vani Falk, who is a 68 y.o.,female. Patients Primary Language is: [place]. According to the patients EMR Congregation Affiliation is: [facility].     The reason the Patient came to the hospital is:   Patient Active Problem List    Diagnosis Date Noted    Attention to colostomy ([facility] 01/24/2023    Colonic diverticular abscess 11/16/2015    Facial laceration 08/28/2015    Abscess 03/24/2015    Candidal urinary tract infection 03/12/2015    Anticoagulated by anticoagulation treatment 03/09/2015    Generalized weakness 03/09/2015    Impaired mobility and ADLs 03/09/2015    Wilder catheter in place 03/09/2015    Citrobacter infection     Urinary tract infection     Abscess of left kidney     Acute deep vein thrombosis of right lower extremity (HCC)     Pneumothorax on right     Decubitus ulcer of sacral region, stage 2 (HCC)     Chronic anemia     Cholecystostomy care ([facility]     Glaucoma     Chronic diastolic heart failure (HCC)     Postoperative anemia due to acute blood loss     Hypoalbuminemia 02/23/2015    Left renal mass 02/15/2015    Sepsis ([facility] 01/26/2015    Acute cholecystitis 01/26/2015    Dyslipidemia     History of hypertension     Type 2 diabetes mellitus (HCC)     Obesity, Class II, BMI 35-39.9     History of kidney stones 06/19/2012        The  provided the following Interventions:  Initiated a relationship of care and support. Provided information about Spiritual Care Services. Chart reviewed. The following outcomes where achieved:  Patient is not interested at this time in completing an Advance Medical Directive. Patient expressed gratitude for 's visit. Assessment:  Patient does not have any Scientologist/cultural needs that will affect patients preferences in health care. There are no spiritual or Scientologist issues which require intervention at this time.      Plan:  Chaplains will continue to follow and will provide pastoral care on an as needed/requested basis.  recommends bedside caregivers page  on duty if patient shows signs of acute spiritual or emotional distress.     [address]  [phone])

## 2023-01-25 NOTE — PROGRESS NOTES
Bedside and Verbal shift change report given to Breanne Segura (oncoming nurse) by Wendy Winchester RN (offgoing nurse). Report included the following information SBAR, Kardex, Intake/Output and MAR.

## 2023-01-25 NOTE — PROGRESS NOTES
[facility] 2 SURGICAL  [address] [facility] [address]  [phone]  Colon and Rectal Surgery Progress Note      Patient: Manuela Sebastian MRN: [id number]  SSN: [id number]    YOB: 1945  Age: 68 y.o.   Sex: female      Admit Date: 1/24/2023    LOS: 1 day     Subjective:     No events    Objective:     Vitals:    01/24/23 2114 01/24/23 2349 01/25/23 0530 01/25/23 0735   BP: (!) 128/52 (!) 125/58 (!) 118/55 (!) 141/50   Pulse: 99 99 86 97   Resp: 16 16 16 16   Temp: (!) 96.6 °F (35.9 °C) 97.1 °F (36.2 °C) 98.1 °F (36.7 °C) 98.1 °F (36.7 °C)   SpO2: 99% 98% 95% 95%   Weight:       Height:            Intake and Output:  Current Shift: 01/25 0701 - 01/25 1900  In: 0   Out: 100 [Urine:100]  Last three shifts: 01/23 1901 - 01/25 0700  In: 1300 [I.V.:1300]  Out: 1160 [Urine:1160]    Physical Exam:     Constitutional: well developed, in NAD  Abdomen: soft, appr tender, ND    Lab/Data Review:    CMP:   Lab Results   Component Value Date/Time     01/25/2023 03:48 AM    K 3.7 01/25/2023 03:48 AM     01/25/2023 03:48 AM    CO2 20 (L) 01/25/2023 03:48 AM    AGAP 12 01/25/2023 03:48 AM     (H) 01/25/2023 03:48 AM    BUN 31 (H) 01/25/2023 03:48 AM    CREA 1.57 (H) 01/25/2023 03:48 AM    CA 7.9 (L) 01/25/2023 03:48 AM    MG 1.5 (L) 01/25/2023 03:48 AM    PHOS 5.2 (H) 01/25/2023 03:48 AM     CBC:   Lab Results   Component Value Date/Time    WBC 14.8 (H) 01/25/2023 03:48 AM    HGB 11.7 (L) 01/25/2023 03:48 AM    HCT 35.3 01/25/2023 03:48 AM    PLT 50 (L) 01/25/2023 03:48 AM        Assessment:     S/p loop colostomy reversal    Plan:     Clears  Dressing changes  D/C man    Signed By: Tracey Loya MD        January 25, 2023

## 2023-01-25 NOTE — PROGRESS NOTES
0900- Patient denies pain or discomfort. Wilder  discontinued as per ordered. Pt tolerating  clear liquid  diet . No other issues noted. Will continue to  monitor. 1200-  patient voided  250 ml. Will continue to monitor. 1400- New dressing applied to abdomen. Patient  tolerated well denies  pain  or discomfort. Family at bedside  will continue to monitor. Problem: Falls - Risk of  Goal: *Absence of Falls  Description: Document Su Senior Fall Risk and appropriate interventions in the flowsheet.   Outcome: Progressing Towards Goal  Note: Fall Risk Interventions:            Medication Interventions: Bed/chair exit alarm                   Problem: Patient Education: Go to Patient Education Activity  Goal: Patient/Family Education  Outcome: Progressing Towards Goal

## 2023-01-26 LAB
ANION GAP SERPL CALC-SCNC: 6 MMOL/L (ref 3–18)
BUN SERPL-MCNC: 45 MG/DL (ref 7–18)
BUN/CREAT SERPL: 24 (ref 12–20)
CALCIUM SERPL-MCNC: 7.7 MG/DL (ref 8.5–10.1)
CHLORIDE SERPL-SCNC: 105 MMOL/L (ref 100–111)
CO2 SERPL-SCNC: 24 MMOL/L (ref 21–32)
CREAT SERPL-MCNC: 1.88 MG/DL (ref 0.6–1.3)
ERYTHROCYTE [DISTWIDTH] IN BLOOD BY AUTOMATED COUNT: 14.3 % (ref 11.6–14.5)
GLUCOSE SERPL-MCNC: 81 MG/DL (ref 74–99)
HCT VFR BLD AUTO: 31.5 % (ref 35–45)
HGB BLD-MCNC: 10.5 G/DL (ref 12–16)
MAGNESIUM SERPL-MCNC: 1.7 MG/DL (ref 1.6–2.6)
MCH RBC QN AUTO: 29.2 PG (ref 24–34)
MCHC RBC AUTO-ENTMCNC: 33.3 G/DL (ref 31–37)
MCV RBC AUTO: 87.5 FL (ref 78–100)
NRBC # BLD: 0 K/UL (ref 0–0.01)
NRBC BLD-RTO: 0 PER 100 WBC
PHOSPHATE SERPL-MCNC: 3.6 MG/DL (ref 2.5–4.9)
PLATELET # BLD AUTO: 52 K/UL (ref 135–420)
PMV BLD AUTO: 10.9 FL (ref 9.2–11.8)
POTASSIUM SERPL-SCNC: 4 MMOL/L (ref 3.5–5.5)
RBC # BLD AUTO: 3.6 M/UL (ref 4.2–5.3)
SODIUM SERPL-SCNC: 135 MMOL/L (ref 136–145)
WBC # BLD AUTO: 11.6 K/UL (ref 4.6–13.2)

## 2023-01-26 PROCEDURE — 83735 ASSAY OF MAGNESIUM: CPT

## 2023-01-26 PROCEDURE — 36415 COLL VENOUS BLD VENIPUNCTURE: CPT

## 2023-01-26 PROCEDURE — 84100 ASSAY OF PHOSPHORUS: CPT

## 2023-01-26 PROCEDURE — 74011250637 HC RX REV CODE- 250/637: Performed by: COLON & RECTAL SURGERY

## 2023-01-26 PROCEDURE — 74011000250 HC RX REV CODE- 250: Performed by: COLON & RECTAL SURGERY

## 2023-01-26 PROCEDURE — 85027 COMPLETE CBC AUTOMATED: CPT

## 2023-01-26 PROCEDURE — 80048 BASIC METABOLIC PNL TOTAL CA: CPT

## 2023-01-26 PROCEDURE — 74011250636 HC RX REV CODE- 250/636: Performed by: COLON & RECTAL SURGERY

## 2023-01-26 PROCEDURE — 65270000029 HC RM PRIVATE

## 2023-01-26 RX ORDER — SODIUM CHLORIDE 9 MG/ML
500 INJECTION, SOLUTION INTRAVENOUS ONCE
Status: COMPLETED | OUTPATIENT
Start: 2023-01-26 | End: 2023-01-26

## 2023-01-26 RX ADMIN — HEPARIN SODIUM 5000 UNITS: 5000 INJECTION INTRAVENOUS; SUBCUTANEOUS at 17:29

## 2023-01-26 RX ADMIN — ACETAMINOPHEN 1000 MG: 500 TABLET ORAL at 06:00

## 2023-01-26 RX ADMIN — ACETAMINOPHEN 1000 MG: 500 TABLET ORAL at 13:57

## 2023-01-26 RX ADMIN — LATANOPROST 1 DROP: 50 SOLUTION OPHTHALMIC at 18:00

## 2023-01-26 RX ADMIN — GABAPENTIN 300 MG: 300 CAPSULE ORAL at 17:29

## 2023-01-26 RX ADMIN — FAMOTIDINE 20 MG: 10 INJECTION, SOLUTION INTRAVENOUS at 20:27

## 2023-01-26 RX ADMIN — DORZOLAMIDE HYDROCHLORIDE 1 DROP: 20 SOLUTION/ DROPS OPHTHALMIC at 20:27

## 2023-01-26 RX ADMIN — ACETAMINOPHEN 1000 MG: 500 TABLET ORAL at 23:27

## 2023-01-26 RX ADMIN — GABAPENTIN 300 MG: 300 CAPSULE ORAL at 08:04

## 2023-01-26 RX ADMIN — FAMOTIDINE 20 MG: 10 INJECTION, SOLUTION INTRAVENOUS at 08:04

## 2023-01-26 RX ADMIN — SODIUM CHLORIDE, POTASSIUM CHLORIDE, SODIUM LACTATE AND CALCIUM CHLORIDE 125 ML/HR: 600; 310; 30; 20 INJECTION, SOLUTION INTRAVENOUS at 06:02

## 2023-01-26 RX ADMIN — SODIUM CHLORIDE 500 ML: 9 INJECTION, SOLUTION INTRAVENOUS at 08:08

## 2023-01-26 RX ADMIN — HEPARIN SODIUM 5000 UNITS: 5000 INJECTION INTRAVENOUS; SUBCUTANEOUS at 08:08

## 2023-01-26 RX ADMIN — HEPARIN SODIUM 5000 UNITS: 5000 INJECTION INTRAVENOUS; SUBCUTANEOUS at 23:27

## 2023-01-26 RX ADMIN — DORZOLAMIDE HYDROCHLORIDE 1 DROP: 20 SOLUTION/ DROPS OPHTHALMIC at 08:08

## 2023-01-26 RX ADMIN — GABAPENTIN 300 MG: 300 CAPSULE ORAL at 22:19

## 2023-01-26 NOTE — PROGRESS NOTES
Problem: Falls - Risk of  Goal: *Absence of Falls  Description: Document [place] Cease Fall Risk and appropriate interventions in the flowsheet.   Outcome: Progressing Towards Goal  Note: Fall Risk Interventions:  Mobility Interventions: Patient to call before getting OOB         Medication Interventions: Patient to call before getting OOB    Elimination Interventions: Bed/chair exit alarm              Problem: Patient Education: Go to Patient Education Activity  Goal: Patient/Family Education  Outcome: Progressing Towards Goal     Problem: Pain  Goal: *Control of Pain  Outcome: Progressing Towards Goal  Goal: *PALLIATIVE CARE:  Alleviation of Pain  Outcome: Progressing Towards Goal     Problem: Patient Education: Go to Patient Education Activity  Goal: Patient/Family Education  Outcome: Progressing Towards Goal

## 2023-01-26 NOTE — PROGRESS NOTES
[facility] 2 SURGICAL  [address] [facility] [address]  [phone]  Colon and Rectal Surgery Progress Note      Patient: Cristobal Oden MRN: [id number]  SSN: [id number]    YOB: 1945  Age: 68 y.o. Sex: female      Admit Date: 1/24/2023    LOS: 2 days     Subjective: Tolerating clears with bm and flatus. Objective:     Vitals:    01/25/23 1609 01/25/23 1947 01/26/23 0007 01/26/23 0357   BP: (!) 102/52 (!) 101/53 (!) 109/56 (!) 118/53   Pulse: 98 92 92 91   Resp: 18 16 16 16   Temp: 98.2 °F (36.8 °C) 97.2 °F (36.2 °C) 97.1 °F (36.2 °C) 97.3 °F (36.3 °C)   SpO2: 91% 95% 95% 93%   Weight:       Height:            Intake and Output:  Current Shift: No intake/output data recorded.   Last three shifts: 01/24 1901 - 01/26 0700  In: 962 [P.O.:962]  Out: 1550 [Urine:1550]    Physical Exam:     Constitutional: well developed, in NAD  Abdomen: soft, appr tender, ND    Lab/Data Review:    CMP:   Lab Results   Component Value Date/Time     (L) 01/26/2023 04:28 AM    K 4.0 01/26/2023 04:28 AM     01/26/2023 04:28 AM    CO2 24 01/26/2023 04:28 AM    AGAP 6 01/26/2023 04:28 AM    GLU 81 01/26/2023 04:28 AM    BUN 45 (H) 01/26/2023 04:28 AM    CREA 1.88 (H) 01/26/2023 04:28 AM    CA 7.7 (L) 01/26/2023 04:28 AM    MG 1.7 01/26/2023 04:28 AM    PHOS 3.6 01/26/2023 04:28 AM     CBC:   Lab Results   Component Value Date/Time    WBC 11.6 01/26/2023 04:28 AM    HGB 10.5 (L) 01/26/2023 04:28 AM    HCT 31.5 (L) 01/26/2023 04:28 AM    PLT 52 (L) 01/26/2023 04:28 AM        Assessment:     S/p loop colostomy reversal, ARF    Plan:     Full liquids  Dressing changes  ARF- may be prerenal - bolus for elevated creatinine  Renal consult tomorrow if no resolution    Signed By: Donna Giron MD        January 26, 2023

## 2023-01-26 NOTE — PROGRESS NOTES
1925 Pt in bed resting ,family member at bed side. 2230 pt ambulated to bathroom then at the hallway. She tolerated. Bedside and Verbal shift change report given to RN (oncoming nurse) by Manuelito Vargas RN (offgoing nurse). Report included the following information SBAR, Kardex, Intake/Output, and MAR.

## 2023-01-27 LAB
ANION GAP SERPL CALC-SCNC: 3 MMOL/L (ref 3–18)
BUN SERPL-MCNC: 40 MG/DL (ref 7–18)
BUN/CREAT SERPL: 28 (ref 12–20)
CALCIUM SERPL-MCNC: 7.7 MG/DL (ref 8.5–10.1)
CHLORIDE SERPL-SCNC: 109 MMOL/L (ref 100–111)
CO2 SERPL-SCNC: 27 MMOL/L (ref 21–32)
CREAT SERPL-MCNC: 1.42 MG/DL (ref 0.6–1.3)
ERYTHROCYTE [DISTWIDTH] IN BLOOD BY AUTOMATED COUNT: 14.5 % (ref 11.6–14.5)
GLUCOSE SERPL-MCNC: 87 MG/DL (ref 74–99)
HCT VFR BLD AUTO: 29.6 % (ref 35–45)
HGB BLD-MCNC: 9.6 G/DL (ref 12–16)
MAGNESIUM SERPL-MCNC: 1.8 MG/DL (ref 1.6–2.6)
MCH RBC QN AUTO: 29 PG (ref 24–34)
MCHC RBC AUTO-ENTMCNC: 32.4 G/DL (ref 31–37)
MCV RBC AUTO: 89.4 FL (ref 78–100)
NRBC # BLD: 0 K/UL (ref 0–0.01)
NRBC BLD-RTO: 0 PER 100 WBC
PHOSPHATE SERPL-MCNC: 2.7 MG/DL (ref 2.5–4.9)
PLATELET # BLD AUTO: 46 K/UL (ref 135–420)
PMV BLD AUTO: 10.4 FL (ref 9.2–11.8)
POTASSIUM SERPL-SCNC: 3.7 MMOL/L (ref 3.5–5.5)
RBC # BLD AUTO: 3.31 M/UL (ref 4.2–5.3)
SODIUM SERPL-SCNC: 139 MMOL/L (ref 136–145)
WBC # BLD AUTO: 5.7 K/UL (ref 4.6–13.2)

## 2023-01-27 PROCEDURE — 74011250637 HC RX REV CODE- 250/637: Performed by: COLON & RECTAL SURGERY

## 2023-01-27 PROCEDURE — 74011250636 HC RX REV CODE- 250/636: Performed by: COLON & RECTAL SURGERY

## 2023-01-27 PROCEDURE — 83735 ASSAY OF MAGNESIUM: CPT

## 2023-01-27 PROCEDURE — 85027 COMPLETE CBC AUTOMATED: CPT

## 2023-01-27 PROCEDURE — 36415 COLL VENOUS BLD VENIPUNCTURE: CPT

## 2023-01-27 PROCEDURE — 65270000029 HC RM PRIVATE

## 2023-01-27 PROCEDURE — 80048 BASIC METABOLIC PNL TOTAL CA: CPT

## 2023-01-27 PROCEDURE — 74011000250 HC RX REV CODE- 250: Performed by: COLON & RECTAL SURGERY

## 2023-01-27 PROCEDURE — 99024 POSTOP FOLLOW-UP VISIT: CPT | Performed by: COLON & RECTAL SURGERY

## 2023-01-27 PROCEDURE — 84100 ASSAY OF PHOSPHORUS: CPT

## 2023-01-27 RX ORDER — ACETAMINOPHEN 500 MG
1000 TABLET ORAL EVERY 6 HOURS
Qty: 56 TABLET | Refills: 0 | Status: SHIPPED | OUTPATIENT
Start: 2023-01-27 | End: 2023-02-03

## 2023-01-27 RX ORDER — SODIUM CHLORIDE 9 MG/ML
500 INJECTION, SOLUTION INTRAVENOUS ONCE
Status: COMPLETED | OUTPATIENT
Start: 2023-01-27 | End: 2023-01-27

## 2023-01-27 RX ORDER — GABAPENTIN 300 MG/1
300 CAPSULE ORAL 3 TIMES DAILY
Qty: 21 CAPSULE | Refills: 0 | Status: SHIPPED | OUTPATIENT
Start: 2023-01-27 | End: 2023-01-30

## 2023-01-27 RX ADMIN — HEPARIN SODIUM 5000 UNITS: 5000 INJECTION INTRAVENOUS; SUBCUTANEOUS at 16:32

## 2023-01-27 RX ADMIN — FAMOTIDINE 20 MG: 10 INJECTION, SOLUTION INTRAVENOUS at 23:13

## 2023-01-27 RX ADMIN — DORZOLAMIDE HYDROCHLORIDE 1 DROP: 20 SOLUTION/ DROPS OPHTHALMIC at 21:09

## 2023-01-27 RX ADMIN — DORZOLAMIDE HYDROCHLORIDE 1 DROP: 20 SOLUTION/ DROPS OPHTHALMIC at 08:49

## 2023-01-27 RX ADMIN — HEPARIN SODIUM 5000 UNITS: 5000 INJECTION INTRAVENOUS; SUBCUTANEOUS at 08:15

## 2023-01-27 RX ADMIN — ACETAMINOPHEN 1000 MG: 500 TABLET ORAL at 23:16

## 2023-01-27 RX ADMIN — SODIUM CHLORIDE, POTASSIUM CHLORIDE, SODIUM LACTATE AND CALCIUM CHLORIDE 125 ML/HR: 600; 310; 30; 20 INJECTION, SOLUTION INTRAVENOUS at 01:09

## 2023-01-27 RX ADMIN — ACETAMINOPHEN 1000 MG: 500 TABLET ORAL at 05:55

## 2023-01-27 RX ADMIN — GABAPENTIN 300 MG: 300 CAPSULE ORAL at 08:15

## 2023-01-27 RX ADMIN — GABAPENTIN 300 MG: 300 CAPSULE ORAL at 22:17

## 2023-01-27 RX ADMIN — SODIUM CHLORIDE, POTASSIUM CHLORIDE, SODIUM LACTATE AND CALCIUM CHLORIDE 125 ML/HR: 600; 310; 30; 20 INJECTION, SOLUTION INTRAVENOUS at 18:20

## 2023-01-27 RX ADMIN — FAMOTIDINE 20 MG: 10 INJECTION, SOLUTION INTRAVENOUS at 08:14

## 2023-01-27 RX ADMIN — GABAPENTIN 300 MG: 300 CAPSULE ORAL at 16:32

## 2023-01-27 RX ADMIN — AMLODIPINE BESYLATE 5 MG: 5 TABLET ORAL at 08:15

## 2023-01-27 RX ADMIN — SODIUM CHLORIDE 500 ML: 9 INJECTION, SOLUTION INTRAVENOUS at 08:49

## 2023-01-27 RX ADMIN — LATANOPROST 1 DROP: 50 SOLUTION OPHTHALMIC at 18:00

## 2023-01-27 NOTE — PROGRESS NOTES
Problem: Falls - Risk of  Goal: *Absence of Falls  Description: Document Esequiel David Fall Risk and appropriate interventions in the flowsheet.   Outcome: Progressing Towards Goal  Note: Fall Risk Interventions:  Mobility Interventions: Patient to call before getting OOB         Medication Interventions: Patient to call before getting OOB    Elimination Interventions: Patient to call for help with toileting needs, Call light in reach              Problem: Patient Education: Go to Patient Education Activity  Goal: Patient/Family Education  Outcome: Progressing Towards Goal     Problem: Pain  Goal: *Control of Pain  Outcome: Progressing Towards Goal  Goal: *PALLIATIVE CARE:  Alleviation of Pain  Outcome: Progressing Towards Goal     Problem: Patient Education: Go to Patient Education Activity  Goal: Patient/Family Education  Outcome: Progressing Towards Goal

## 2023-01-27 NOTE — PROGRESS NOTES
2025 Pt ambulated hallway      Bedside and Verbal shift change report given to [facility] JASKARAN MENDEZ RN (oncoming nurse) by Todd Maurice RN (offgoing nurse). Report included the following information SBAR, Kardex, Intake/Output, and MAR.

## 2023-01-27 NOTE — PROGRESS NOTES
Bedside shift change report given to Shreyas Smith RN (oncoming nurse) by Deyanira Romero RN (offgoing nurse). Report included the following information SBAR, Kardex, Procedure Summary, and MAR.

## 2023-01-27 NOTE — ROUTINE PROCESS
TRANSFER - OUT REPORT:    Verbal report given to Elsie Malone (name) on Whitney Simmons  being transferred to [employer] (unit) for routine progression of care       Report consisted of patients Situation, Background, Assessment and   Recommendations(SBAR). Information from the following report(s) SBAR, Kardex, Intake/Output, and Recent Results was reviewed with the receiving nurse. Lines:   Peripheral IV 01/24/23 Distal;Left;Posterior Forearm (Active)   Site Assessment Clean, dry, & intact 01/27/23 0758   Phlebitis Assessment 0 01/27/23 0758   Infiltration Assessment 0 01/27/23 0758   Dressing Status Clean, dry, & intact 01/27/23 0758   Dressing Type Tape;Transparent 01/27/23 0758   Hub Color/Line Status Pink; Infusing 01/27/23 0758   Action Taken Open ports on tubing capped 01/27/23 0758   Alcohol Cap Used Yes 01/27/23 0758        Opportunity for questions and clarification was provided.       Patient transported with:   [employer]

## 2023-01-27 NOTE — ACP (ADVANCE CARE PLANNING)
Advance Care Planning   Advance Care Planning Inpatient Note  [address] [facility] Department    Today's Date: 1/27/2023  Unit: [facility]    Received request from admission screening. Upon review of chart and communication with care team, patient's decision making abilities are not in question. Patient was/were present in the room during visit. Goals of ACP Conversation:  Verify healthcare decision makers    Health Care Decision Makers:      Primary Decision Maker: Pat Bowles () - Daughter - [phone]    Secondary Decision Maker: Fidelina Contreras - [phone]    Supplemental (Other) Decision Maker: Raghavendra Spencer - [phone]    Summary:  [address]  Documented Next of Kin, per patient report  Patient stated, \"I'm sure I had one at home (referring to an Advance Medical Directive). I had it here in 2017. Chart reviewed but patient does not have an Advance Medical Directive on file. Asked who among her list of contacts are the primary and secondary decision makers and she stated, \"All my children will decide. \" Patient named Aleksandar Rahman as the primary decision maker and Palmer Rojas as the secondary decision maker and Agatha Finn as other/supplemental decision maker. Patient   deferred conversation and deferred creating a new AMD because she believes she has a copy at home that she could bring in next time. Advance Care Planning Documents (Patient Wishes) on file:  None     Assessment:    Patient's ability to make healthcare decisions is not in question. Patient claims she has an AMD at home.     Interventions:  Requested patient/family submit existing document(s) for our records: Documented patient's healthcare decision makers and nearest of kin    Care Preferences Communicated:  No    Outcomes/Plan:  ACP Discussion Completed    SHALOM Ortez on 1/27/2023 at 3:17 PM

## 2023-01-27 NOTE — ROUTINE PROCESS
Wet to Dry dressing change. Pt tolerated well, did not complain of pain. No signs of distress noted.

## 2023-01-27 NOTE — PROGRESS NOTES
[facility] SURGICAL  [address] [facility] [place] [address]  [phone]  Colon and Rectal Surgery Progress Note      Patient: Nani Sanchez MRN: [id number]  SSN: [id number]    YOB: 1945  Age: 68 y.o. Sex: female      Admit Date: 1/24/2023    LOS: 3 days     Subjective: Tolerating fulls. +BM.      Objective:     Vitals:    01/26/23 2025 01/26/23 2352 01/27/23 0407 01/27/23 0727   BP: (!) 107/52 114/64 117/65 (!) 103/58   Pulse: 84 84 78 74   Resp: 16 16 16 18   Temp: 97.8 °F (36.6 °C) 98 °F (36.7 °C) 97.8 °F (36.6 °C) 97.5 °F (36.4 °C)   SpO2: 95% 94% 95% 98%   Weight:       Height:            Intake and Output:  Current Shift: 01/27 0701 - 01/27 1900  In: -   Out: 600 [Urine:600]  Last three shifts: 01/25 1901 - 01/27 0700  In: 1200 [P.O.:1200]  Out: 1000 [Urine:1000]    Physical Exam:     Constitutional: well developed, in NAD  Abdomen: soft, appr tender, ND    Lab/Data Review:    CMP:   Lab Results   Component Value Date/Time     01/27/2023 04:58 AM    K 3.7 01/27/2023 04:58 AM     01/27/2023 04:58 AM    CO2 27 01/27/2023 04:58 AM    AGAP 3 01/27/2023 04:58 AM    GLU 87 01/27/2023 04:58 AM    BUN 40 (H) 01/27/2023 04:58 AM    CREA 1.42 (H) 01/27/2023 04:58 AM    CA 7.7 (L) 01/27/2023 04:58 AM    MG 1.8 01/27/2023 04:58 AM    PHOS 2.7 01/27/2023 04:58 AM     CBC:   Lab Results   Component Value Date/Time    WBC 5.7 01/27/2023 04:58 AM    HGB 9.6 (L) 01/27/2023 04:58 AM    HCT 29.6 (L) 01/27/2023 04:58 AM    PLT 46 (L) 01/27/2023 04:58 AM        Assessment:     S/p loop colostomy reversal, ARF    Plan:     Advance to regular diet  Dressing changes  ARF improved, additional bolus today, check again tomorrow    Signed By: Oly Lucero MD        January 27, 2023

## 2023-01-27 NOTE — PROGRESS NOTES
conducted a Follow up consultation and Spiritual Assessment for Whitney Simmons, who is a 68 y.o.,female. The  provided the following Interventions:  Continued the relationship of care and support. Listened empathically. Offered prayer and assurance of continued prayer on patient's behalf. Chart reviewed. The following outcomes were achieved:  Goals of ACP Conversation:  Verify healthcare decision makers     Health Care Decision Makers:      Primary Decision Maker: Elizabeth Ricoer () - Daughter - [phone]    Secondary Decision Maker: Marjose alfredojesi Franklin - [phone]    Supplemental (Other) Decision Maker: Jaki Umaña - [phone]     Summary:  [address]  Documented Next of Kin, per patient report  Patient stated, \"I'm sure I had one at home (referring to an Advance Medical Directive). I had it here in 2017. Chart reviewed but patient does not have an Advance Medical Directive on file. Asked who among her list of contacts are the primary and secondary decision makers and she stated, \"All my children will decide. \" Patient named Janae Verma as the primary decision maker and Juan Tolliver as the secondary decision maker and Can Cochran as other/supplemental decision maker. Patient   deferred conversation and deferred creating a new AMD because she believes she has a copy at home that she could bring in next time. Patient expressed gratitude for 's visit. Assessment:  There are no further spiritual or Taoism issues which require Spiritual Care Services interventions at this time. Plan:  Chaplains will continue to follow and will provide pastoral care on an as needed/requested basis.  recommends bedside caregivers page  on duty if patient shows signs of acute spiritual or emotional distress.      Derrell Arambula5   [phone]

## 2023-01-27 NOTE — ROUTINE PROCESS
End of Shift    Patient: Giovana Linda (47 y.o. female)  Date: 1/26/2023  Diagnosis: Attention to colostomy [facility]) [Z43.3] <principal problem not specified>    Precautions:      Bedside verbal report given to Sis. Mely REYES, (oncoming nurse) from ERIC Galicia (outgoing nurse)    Report consisted of patients Situation, Background, Assessment and   Recommendations(SBAR). Information from the following report(s): Kardex, MAR and Recent Results was reviewed with the oncoming nurse. Opportunity for questions and clarification was provided. Wound Prevention Checklist    []  Heel prevention boots placed on patient    []  Patient turned q2h during shift    []  Lift team ordered    []  Patient on [place] bed/Specialty bed    []  Each Wound is documented during shift (Stage, Color, drainage, odor, measurements, and dressings)    [x]  Dual skin checks done at bedside during shift report with Sis.  Mely Reyna RN

## 2023-01-27 NOTE — DISCHARGE INSTRUCTIONS
Instructions After Colectomy    Dressing changes daily    No heavy lifting (nothing more than 10-15 lbs)    Low fiber diet - avoid raw fruits and vegetables, but small amounts of cooked vegetables are ok. Eat white bread products instead of wheat bread products. Meats, fish, chicken, noodles are ok. Showers over your wounds are ok, but avoid submerging wounds in pools or baths.  The strips over your wound will fall off on their own or I will remove them in the office    Make an office follow up appointment in 2 weeks

## 2023-01-27 NOTE — PROGRESS NOTES
01/27/23 1813   Vitals   Temp 98 °F (36.7 °C)   Temp Source Oral   Pulse (Heart Rate) 82   Heart Rate Source Monitor   Resp Rate 18   O2 Sat (%) 98 %   Level of Consciousness 0   BP (!) 113/58   MAP (Calculated) 76   BP 1 Location Right upper arm   BP 1 Method Automatic   BP Patient Position At rest   MEWS Score 1   Pain 1   Pain Scale 1 Numeric (0 - 10)   Pain Intensity 1 0   Patient Stated Pain Goal 0   Pain Reassessment 1 Patient resting w/respiratory rate greater than 10     Patient arrived to unit via wheelchair in no apparent distress. Primary RN oriented patient to room and call light.

## 2023-01-28 ENCOUNTER — HOME HEALTH ADMISSION (OUTPATIENT)
Dept: HOME HEALTH SERVICES | Facility: HOME HEALTH | Age: 78
End: 2023-01-28
Payer: MEDICARE

## 2023-01-28 VITALS
BODY MASS INDEX: 25.48 KG/M2 | HEART RATE: 70 BPM | TEMPERATURE: 97.8 F | RESPIRATION RATE: 20 BRPM | HEIGHT: 69 IN | OXYGEN SATURATION: 94 % | SYSTOLIC BLOOD PRESSURE: 173 MMHG | DIASTOLIC BLOOD PRESSURE: 93 MMHG | WEIGHT: 172 LBS

## 2023-01-28 LAB
ANION GAP SERPL CALC-SCNC: 4 MMOL/L (ref 3–18)
BUN SERPL-MCNC: 29 MG/DL (ref 7–18)
BUN/CREAT SERPL: 24 (ref 12–20)
CALCIUM SERPL-MCNC: 7.7 MG/DL (ref 8.5–10.1)
CHLORIDE SERPL-SCNC: 113 MMOL/L (ref 100–111)
CO2 SERPL-SCNC: 26 MMOL/L (ref 21–32)
CREAT SERPL-MCNC: 1.19 MG/DL (ref 0.6–1.3)
ERYTHROCYTE [DISTWIDTH] IN BLOOD BY AUTOMATED COUNT: 14.4 % (ref 11.6–14.5)
GLUCOSE SERPL-MCNC: 96 MG/DL (ref 74–99)
HCT VFR BLD AUTO: 27.7 % (ref 35–45)
HGB BLD-MCNC: 9.1 G/DL (ref 12–16)
MAGNESIUM SERPL-MCNC: 1.9 MG/DL (ref 1.6–2.6)
MCH RBC QN AUTO: 29.1 PG (ref 24–34)
MCHC RBC AUTO-ENTMCNC: 32.9 G/DL (ref 31–37)
MCV RBC AUTO: 88.5 FL (ref 78–100)
NRBC # BLD: 0 K/UL (ref 0–0.01)
NRBC BLD-RTO: 0 PER 100 WBC
PHOSPHATE SERPL-MCNC: 2.1 MG/DL (ref 2.5–4.9)
PLATELET # BLD AUTO: 55 K/UL (ref 135–420)
PMV BLD AUTO: 10.9 FL (ref 9.2–11.8)
POTASSIUM SERPL-SCNC: 3.6 MMOL/L (ref 3.5–5.5)
RBC # BLD AUTO: 3.13 M/UL (ref 4.2–5.3)
SODIUM SERPL-SCNC: 143 MMOL/L (ref 136–145)
WBC # BLD AUTO: 2.5 K/UL (ref 4.6–13.2)

## 2023-01-28 PROCEDURE — 84100 ASSAY OF PHOSPHORUS: CPT

## 2023-01-28 PROCEDURE — 85027 COMPLETE CBC AUTOMATED: CPT

## 2023-01-28 PROCEDURE — 74011250637 HC RX REV CODE- 250/637: Performed by: COLON & RECTAL SURGERY

## 2023-01-28 PROCEDURE — 36415 COLL VENOUS BLD VENIPUNCTURE: CPT

## 2023-01-28 PROCEDURE — 2709999900 HC NON-CHARGEABLE SUPPLY

## 2023-01-28 PROCEDURE — 74011250636 HC RX REV CODE- 250/636: Performed by: COLON & RECTAL SURGERY

## 2023-01-28 PROCEDURE — 74011000250 HC RX REV CODE- 250: Performed by: COLON & RECTAL SURGERY

## 2023-01-28 PROCEDURE — 80048 BASIC METABOLIC PNL TOTAL CA: CPT

## 2023-01-28 PROCEDURE — 83735 ASSAY OF MAGNESIUM: CPT

## 2023-01-28 RX ADMIN — ACETAMINOPHEN 1000 MG: 500 TABLET ORAL at 06:26

## 2023-01-28 RX ADMIN — FAMOTIDINE 20 MG: 10 INJECTION, SOLUTION INTRAVENOUS at 08:35

## 2023-01-28 RX ADMIN — HEPARIN SODIUM 5000 UNITS: 5000 INJECTION INTRAVENOUS; SUBCUTANEOUS at 08:35

## 2023-01-28 RX ADMIN — GABAPENTIN 300 MG: 300 CAPSULE ORAL at 08:39

## 2023-01-28 RX ADMIN — DORZOLAMIDE HYDROCHLORIDE 1 DROP: 20 SOLUTION/ DROPS OPHTHALMIC at 09:00

## 2023-01-28 RX ADMIN — SODIUM CHLORIDE, POTASSIUM CHLORIDE, SODIUM LACTATE AND CALCIUM CHLORIDE 125 ML/HR: 600; 310; 30; 20 INJECTION, SOLUTION INTRAVENOUS at 02:04

## 2023-01-28 NOTE — PROGRESS NOTES
Care Management Interventions  PCP Verified by CM: Yes  Mode of Transport at Discharge: Self (Patient's daughter or son will be transporting patient home today at time of discharge. )  Transition of Care Consult (CM Consult): [address]: Yes  Discharge Durable Medical Equipment: No  Physical Therapy Consult: No  Occupational Therapy Consult: No  Speech Therapy Consult: No  Support Systems: Other (Comment) (Patient lives alone, but her children will be staying with her for a few days after discharge to assist her. )  Confirm Follow Up Transport: Family  The Plan for Transition of Care is Related to the Following Treatment Goals : Home with [address] [place] [address] Alexy Hare Gacristal  The Patient and/or Patient Representative was Provided with a Choice of Provider and Agrees with the Discharge Plan?: Yes  Freedom of Choice List was Provided with Basic Dialogue that Supports the Patient's Individualized Plan of Care/Goals, Treatment Preferences and Shares the Quality Data Associated with the Providers?: Yes  Discharge Location  Patient Expects to be Discharged to[de-identified] Home with home health        Patient lives alone, with 4 steps to enter with handrails. Patient's children will be staying with patient for a few days after discharge to assist the patient. Patient has DME [employer] at home. Patient was not using Home Health services at home, has stayed at SNF/Rehab before, but not in the last 60 days. Patient is not a Dialysis patient, and does not use Oxygen at home. FOC verbal consent given for [facility]. Patient's daughter or son will be transporting patient home today at time of discharge. CM notified Roberto Joel with [facility], said they can take patient. CM put patient in the queue for [facility].            Derek Grimes, RN  Case Management [phone]

## 2023-01-28 NOTE — PROGRESS NOTES
Admit Date: 1/24/2023    Assessment    Vani Falk is a 68 y.o. female s/p loop colostomy reversal    Patient Active Problem List   Diagnosis Code    History of kidney stones Z87.442    Dyslipidemia E78.5    History of hypertension Z86.79    Type 2 diabetes mellitus ([facility]) E11.9    Obesity, Class II, BMI 35-39.9 E66.9    Sepsis ([facility] A41.9    Acute cholecystitis K81.0    Left renal mass N28.89    Hypoalbuminemia E88.09    Citrobacter infection A49.8    Urinary tract infection N39.0    Abscess of left kidney N15.1    Acute deep vein thrombosis of right lower extremity ([facility]) I82.401    Anticoagulated by anticoagulation treatment Z79.01    Pneumothorax on right J93.9    Decubitus ulcer of sacral region, stage 2 ([facility]) L89.152    Chronic anemia D64.9    Generalized weakness R53.1    Impaired mobility and ADLs Z74.09, Z78.9    Cholecystostomy care ([facility]) Z43.4    Glaucoma H40.9    Chronic diastolic heart failure ([facility]) I50.32    Postoperative anemia due to acute blood loss D62    Candidal urinary tract infection B37.49    Wilder catheter in place Z97.8    Abscess L02.91    Facial laceration S01.81XA    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 She is tolerating her diet with no nausea or vomiting. She is passing flatus and having bowel movements.      Objective    Physical Exam:   Visit Vitals  /60 (BP 1 Location: Left upper arm, BP Patient Position: At rest)   Pulse 78   Temp 97.5 °F (36.4 °C)   Resp 18   Ht 5' 9\" (1.753 m)   Wt 78 kg (172 lb)   SpO2 94%   BMI 25.40 kg/m²       Intake/Output Summary (Last 24 hours) at 1/28/2023 0629  Last data filed at 1/28/2023 0405  Gross per 24 hour   Intake 1086 ml   Output 1600 ml   Net -514 ml     Physical Exam  Constitutional:       Appearance: Normal appearance. Abdominal:      General: Abdomen is flat. Palpations: Abdomen is soft. Comments: Wound packed with gauze minimal serous drainage, non tender   Neurological:      Mental Status: She is alert.              Amie President,   Phone: [phone]

## 2023-01-28 NOTE — PROGRESS NOTES
Problem: Falls - Risk of  Goal: *Absence of Falls  Description: Document Green Salvia Fall Risk and appropriate interventions in the flowsheet.   Outcome: Progressing Towards Goal  Note: Fall Risk Interventions:  Mobility Interventions: Patient to call before getting OOB         Medication Interventions: Patient to call before getting OOB    Elimination Interventions: Patient to call for help with toileting needs, Call light in reach              Problem: Patient Education: Go to Patient Education Activity  Goal: Patient/Family Education  Outcome: Progressing Towards Goal     Problem: Pain  Goal: *Control of Pain  Outcome: Progressing Towards Goal  Goal: *PALLIATIVE CARE:  Alleviation of Pain  Outcome: Progressing Towards Goal     Problem: Patient Education: Go to Patient Education Activity  Goal: Patient/Family Education  Outcome: Progressing Towards Goal     Problem: Infection - Risk of, Surgical Site Infection  Goal: *Absence of surgical site infection signs and symptoms  Outcome: Progressing Towards Goal     Problem: Patient Education: Go to Patient Education Activity  Goal: Patient/Family Education  Outcome: Progressing Towards Goal

## 2023-01-28 NOTE — PROGRESS NOTES
Problem: Falls - Risk of  Goal: *Absence of Falls  Description: Document Evelia Paz Fall Risk and appropriate interventions in the flowsheet.   1/28/2023 0410 by Colby Power RN  Outcome: Progressing Towards Goal  Note: Fall Risk Interventions:  Mobility Interventions: Patient to call before getting OOB         Medication Interventions: Patient to call before getting OOB    Elimination Interventions: Patient to call for help with toileting needs, Call light in reach           1/28/2023 0409 by Colby Power RN  Outcome: Progressing Towards Goal  Note: Fall Risk Interventions:  Mobility Interventions: Patient to call before getting OOB         Medication Interventions: Patient to call before getting OOB    Elimination Interventions: Patient to call for help with toileting needs, Call light in reach              Problem: Pain  Goal: *Control of Pain  1/28/2023 0410 by Colby Power RN  Outcome: Progressing Towards Goal  1/28/2023 0409 by Colby Power RN  Outcome: Progressing Towards Goal     Problem: Patient Education: Go to Patient Education Activity  Goal: Patient/Family Education  1/28/2023 0410 by Colby Power RN  Outcome: Progressing Towards Goal  1/28/2023 0409 by Colby Power RN  Outcome: Progressing Towards Goal     Problem: Infection - Risk of, Surgical Site Infection  Goal: *Absence of surgical site infection signs and symptoms  Outcome: Progressing Towards Goal     Problem: Patient Education: Go to Patient Education Activity  Goal: Patient/Family Education  Outcome: Progressing Towards Goal     Problem: Patient Education: Go to Patient Education Activity  Goal: Patient/Family Education  Outcome: Progressing Towards Goal

## 2023-01-28 NOTE — PROGRESS NOTES
Bedside and Verbal shift change report given to [address], RN (oncoming nurse) by Jason Stearns, RN (offgoing nurse). Report given with SBAR, Kardex, Intake/Output, MAR and Recent Results.

## 2023-01-28 NOTE — PROGRESS NOTES
Discharge order noted for today. Pt has been accepted to [facility] agency. Spoke with patient and she is  agreeable to the transition plan today. Transport has been arranged through patient's daughter or son. Patient's  home health  orders have been forwarded to [address] [place] [address] via [facility].   Discharge information has been documented on the AVS.           Aida Cruz RN  Case Management [phone]

## 2023-01-29 ENCOUNTER — HOME CARE VISIT (OUTPATIENT)
Dept: SCHEDULING | Facility: HOME HEALTH | Age: 78
End: 2023-01-29
Payer: MEDICARE

## 2023-01-29 PROCEDURE — 400018 HH-NO PAY CLAIM PROCEDURE

## 2023-01-29 PROCEDURE — G0299 HHS/HOSPICE OF RN EA 15 MIN: HCPCS

## 2023-01-29 NOTE — Clinical Note
[address] [facility]] ADMITTED  Mikhail Montelongo FOR WOUND CARE S/P LOOP COLOSTOMY REVERSAL, ANTICIPATED SN FREQUENCY 4WK1, [id number], 2PRN. THANK YOU FOR REFERRAL !     RESPECTFULLY,  ERIC ALCARAZ

## 2023-01-30 ENCOUNTER — HOME CARE VISIT (OUTPATIENT)
Dept: SCHEDULING | Facility: HOME HEALTH | Age: 78
End: 2023-01-30
Payer: MEDICARE

## 2023-01-30 DIAGNOSIS — Z43.3 ATTENTION TO COLOSTOMY (HCC): ICD-10-CM

## 2023-01-30 PROCEDURE — G0299 HHS/HOSPICE OF RN EA 15 MIN: HCPCS

## 2023-01-30 RX ORDER — GABAPENTIN 300 MG/1
CAPSULE ORAL
Qty: 21 CAPSULE | Refills: 0 | Status: SHIPPED | OUTPATIENT
Start: 2023-01-30

## 2023-01-31 VITALS
HEART RATE: 80 BPM | DIASTOLIC BLOOD PRESSURE: 82 MMHG | TEMPERATURE: 97.7 F | SYSTOLIC BLOOD PRESSURE: 120 MMHG | OXYGEN SATURATION: 99 % | RESPIRATION RATE: 20 BRPM

## 2023-01-31 VITALS
OXYGEN SATURATION: 99 % | SYSTOLIC BLOOD PRESSURE: 130 MMHG | TEMPERATURE: 97.7 F | RESPIRATION RATE: 20 BRPM | HEART RATE: 78 BPM | DIASTOLIC BLOOD PRESSURE: 72 MMHG

## 2023-01-31 NOTE — HOME HEALTH
delivery delivered Skilled reason for visit: s/p loop colostomy reversal wound care        Primary caregiver is pt son available Monday full day from 8am to 8pm and is able to assist with meal prep and setup, fill med box, perform wound care, grocery shopping, household chores and transportation to MD appointment      Secondary caregiver is daughter available weekends only from [address] m to 9pm and is able to assist with meal prep and setup, grocery shopping and household chores          Medications reconciled and all medications are available in the home this visit. The following education was provided regarding medications: SN to educate patient concerning the use of gabapentin is to increased pain relief affects by using the CNS to decrease symptoms of pain and assist Tramadol or other prescribed pain medication s, even Tylenol ER in ultimate pain relief. SN to advise patient to not allow pain level to linger for long periods of time; try taking either drug 30mins or so apart, only when due at prescribed times. Medications  are effective at this time. MD notified of any discrepancies/look a like medications/medication interactions NA. Home health supplies by type and quantity ordered/de livered this visit include: wound care supplies      Patient education provided this visit to include: patient made aware to monitor for s/s of infection [increased swelling, increased redness around site, increased pain, foul smelling drainage, fever] aware who to report to/when. .        Patient level of understanding of education provided: pt verbalized understanding of education provided this visit        [employer] Education Provided: Cont ainers should be made of hard plastic, be puncture-resistant and leakproof,    such as a laundry detergent or bleach bottle. When the container is ¾ full, it should be sealed with tape and labeled    DO NOT RECYCLE prior to discarding in the regular trash.           Patient response to procedure performed:  pt tolerated without complaints of pain        Home exercise program/Homework provided: pt instructed to follow a high protein diet for h ealing- to try to get 90g protein daily. agency progress towards goal : progressing    patient progress towards goal: progressing     Pt/Caregiver instructed on plan of care and are agreeable to plan of care at this time. Discharge planning discussed with patient and caregiver. Discharge planning as follows: Patient will  be discharged once post surgical incision is healed, education has completed, patient is medically stable and pt/cg are able to independently manage medications and disease process.

## 2023-01-31 NOTE — HOME HEALTH
Skilled services/Home bound verification: Dx: s/p loop colostomy reversal     Skilled Reason for admission/summary of clinical condition:   Skilled nursing for disease and medication management, Educate and teach patient and family, on signs of symptoms of infection, and wet to dry dressing changes after discharge   This patient is homebound for the following reasons Requires considerable and taxing effort to leave the home  and Requires the assistance of 1 or more persons to leave the home . Discussed with patient and caregiver(s) availability and willingness to provide care. Primary caregiver is pt son available Monday full day from 8am to 8pm and is able to assist with meal prep and setup, fill med box, perform wound care, grocery shopping, household chores and transportation to MD appointment    Secondary caregiver is daughter available weekends only from 9am to 9pm and is able to assist with meal prep and setup, grocery shopping and household chores        Medications reconciled and all medications are available in the home this visit. The following education was provided regarding medications: SN to educate patient concerning the use of gabapentin is to increased pain relief affects by using the CNS to decrease symptoms of pain and assist Tramadol or other prescribed pain medications, even Tylenol ER in ultimate pain relief. SN to advise patient to not allow pain level to linger for long periods of time; try taking either drug 30mins or so apart, only when due at prescribed times. Medications  are effective at this time. High risk medication teaching regarding anticoagulants, hyperglycemic agents or opiod narcotics performed (specify) High risk medication teaching regarding anticoagulants, hyperglycemic agents or opiod narcotics performed (specify) Controlled substance OXYCODONE  High risk for addiction and dependence.  Can cause respiratory distress and death when taken in high doses or when combined with other substances, especially alcohol or other illicit drugs such as heroin or cocaine.    ,    MD notified of any discrepancies/look a like medications/medication interactions NA. Home health supplies by type and quantity ordered/delivered this visit include: wound care supplies     Patient education provided this visit to include: patient made aware to monitor for s/s of infection [increased swelling, increased redness around site, increased pain, foul smelling drainage, fever] aware who to report to/when. .      Patient level of understanding of education provided: pt verbalized understanding of education provided this visit      Sharps Education Provided: Containers should be made of hard plastic, be puncture-resistant and leakproof,   such as a laundry detergent or bleach bottle. When the container is ¾ full, it should be sealed with tape and labeled   DO NOT RECYCLE prior to discarding in the regular trash. Patient response to procedure performed:  pt tolerated without complaints of pain      Home exercise program/Homework provided: pt instructed to follow a high protein diet for healing- to try to get 90g protein daily. Pt/Caregiver instructed on plan of care and are agreeable to plan of care at this time. Physician Grace Benavidez MD notified of patient admission to home health and plan of care including anticipated frequency of 4wk1,3wk4 2prn and treatments/interventions/modalities of SN ONLY    Discharge planning discussed with patient and caregiver. Discharge planning as follows: Patient will be discharged once post surgical incision is healed, education has completed, patient is medically stable and pt/cg are able to independently manage medications and disease process. Martha Peñaloza Pt/Caregiver did verbalize understanding of discharge planning.      Next MD appointment 2/9/23 with Grace Benavidez MD.  Patient/caregiver encouraged/instructed to keep appointment as lack of follow through with physician appointment could result in discontinuation of home care services for non-compliance.

## 2023-01-31 NOTE — DISCHARGE SUMMARY
Colon and Rectal Surgery Discharge Summary     Patient: Oskar Rivera MRN: [id number]  SSN: [id number]    YOB: 1945  Age: 68 y.o.   Sex: female       Admit Date: 1/24/2023    Discharge Date: 1/31/2023      Admission Diagnoses: Attention to colostomy [facility]) [Z43.3]    Discharge Diagnoses: Same    Procedure: Loop Colostomy Reversal, Colonoscopy    Problem List as of 1/28/2023 Date Reviewed: 9/7/2022            Codes Class Noted - Resolved    Colonic diverticular abscess ICD-10-CM: K57.20  ICD-9-CM: 562.11  11/16/2015 - Present        Facial laceration ICD-10-CM: S01.81XA  ICD-9-CM: 873.40  8/28/2015 - Present        Abscess ICD-10-CM: L02.91  ICD-9-CM: 682.9  3/24/2015 - Present        Candidal urinary tract infection ICD-10-CM: B37.49  ICD-9-CM: 112.2  3/12/2015 - Present        Citrobacter infection ICD-10-CM: A49.8  ICD-9-CM: 041.85  Unknown - Present        Urinary tract infection ICD-10-CM: N39.0  ICD-9-CM: 599.0  Unknown - Present        Abscess of left kidney ICD-10-CM: N15.1  ICD-9-CM: 590.2  Unknown - Present    Overview Addendum 3/9/2015  4:13 PM by Lew Valle MD     S/P CT-guided left lower renal pole abscess drainage and drainage catheter placement (2/16/2015 - Dr. Amado Quintero)   S/P Percutaneous drainage of abscess via anterior abdomen (2/17/2015 - Dr. Yeison Leos)             Acute deep vein thrombosis of right lower extremity ([facility] ICD-10-CM: I82.401  ICD-9-CM: 453.40  Unknown - Present        Anticoagulated by anticoagulation treatment ICD-10-CM: Z79.01  ICD-9-CM: V58.61  3/9/2015 - Present    Overview Signed 3/9/2015  3:45 PM by Lew Valle MD     On Rivaroxaban             Pneumothorax on right ICD-10-CM: J93.9  ICD-9-CM: 512.89  Unknown - Present    Overview Signed 3/9/2015  4:15 PM by Lew Valle MD     S/P Right chest tube insertion (2/27/2015 - Dr. Yeison Leos)             Decubitus ulcer of sacral region, stage 2 ([facility] (Chronic) ICD-10-CM: J76.697  ICD-9-CM: 707.03, 707.22  Unknown - Present        Chronic anemia (Chronic) ICD-10-CM: D64.9  ICD-9-CM: 250. 9  Unknown - Present        Generalized weakness ICD-10-CM: R53.1  ICD-9-CM: 780.79  3/9/2015 - Present        Impaired mobility and ADLs ICD-10-CM: Z74.09, Z78.9  ICD-9-CM: V49.89  3/9/2015 - Present        Cholecystostomy care [facility]) ICD-10-CM: Z43.4  ICD-9-CM: V55.4  Unknown - Present        Glaucoma (Chronic) ICD-10-CM: H40.9  ICD-9-CM: 365.9  Unknown - Present        Chronic diastolic heart failure (HCC) (Chronic) ICD-10-CM: I50.32  ICD-9-CM: 428.32  Unknown - Present        Postoperative anemia due to acute blood loss ICD-10-CM: D62  ICD-9-CM: 285.1  Unknown - Present        Wilder catheter in place ICD-10-CM: Z97.8  ICD-9-CM: V45.89  3/9/2015 - Present        Hypoalbuminemia (Chronic) ICD-10-CM: E88.09  ICD-9-CM: 273.8  2/23/2015 - Present        Left renal mass (Chronic) ICD-10-CM: N28.89  ICD-9-CM: 593.9  2/15/2015 - Present    Overview Signed 3/9/2015  4:12 PM by Cory Samaniego MD     S/P CT-guided left lower renal pole biopsy (2/16/2015 - Dr. Krystal Mccrary)             Sepsis [facility]) ICD-10-CM: A41.9  ICD-9-CM: 038.9, 995.91  1/26/2015 - Present        Acute cholecystitis ICD-10-CM: K81.0  ICD-9-CM: 575.0  1/26/2015 - Present    Overview Signed 3/9/2015  4:09 PM by Cory Samaniego MD     S/P CT-guided cholecystostomy tube placement (1/26/2015 - Dr. Krystal Mccrary)             Dyslipidemia (Chronic) ICD-10-CM: E78.5  ICD-9-CM: 272.4  Unknown - Present        History of hypertension (Chronic) ICD-10-CM: Z86.79  ICD-9-CM: V12.59  Unknown - Present        Type 2 diabetes mellitus (HCC) (Chronic) ICD-10-CM: E11.9  ICD-9-CM: 250.00  Unknown - Present        Obesity, Class II, BMI 35-39.9 (Chronic) ICD-10-CM: E66.9  ICD-9-CM: 278.00  Unknown - Present        History of kidney stones (Chronic) ICD-10-CM: I25.793  ICD-9-CM: V13.01  6/19/2012 - Present        RESOLVED: Anemia ICD-10-CM: D64.9  ICD-9-CM: 285.9  4/22/2015 - 4/29/2015        RESOLVED: Pancytopenia ([facility] ICD-10-CM: R90.217  ICD-9-CM: 284.19  4/21/2015 - 4/29/2015        RESOLVED: ANIL (acute kidney injury) ([facility] ICD-10-CM: N17.9  ICD-9-CM: 584.9  2/10/2015 - 2/22/2015        RESOLVED: ARF (acute renal failure) ([facility] ICD-10-CM: N17.9  ICD-9-CM: 584.9  1/26/2015 - 2/22/2015        Attention to colostomy [facility]) ICD-10-CM: Z43.3  ICD-9-CM: V55.3  1/24/2023 - Present            Discharge Condition: Good    Hospital Course: To OR for above. Diet advanced. + BM. Afebrile througout. Consults: None    Significant Diagnostic Studies: None    Disposition: home    Discharge Medications:   Cannot display discharge medications since this patient is not currently admitted. Activity: No heavy lifting for 6 weeks  Diet: Regular Diet  Wound Care: Wet to dry dressings to wound    Follow-up Appointments   Procedures    FOLLOW UP VISIT Appointment in: Two Weeks     Standing Status:   Standing     Number of Occurrences:   1     Order Specific Question:   Appointment in     Answer: Two Weeks       Signed By: Bekah Germain MD     January 31, 2023        .

## 2023-02-01 ENCOUNTER — HOME CARE VISIT (OUTPATIENT)
Dept: SCHEDULING | Facility: HOME HEALTH | Age: 78
End: 2023-02-01
Payer: MEDICARE

## 2023-02-01 PROCEDURE — G0300 HHS/HOSPICE OF LPN EA 15 MIN: HCPCS

## 2023-02-04 ENCOUNTER — HOME CARE VISIT (OUTPATIENT)
Dept: SCHEDULING | Facility: HOME HEALTH | Age: 78
End: 2023-02-04
Payer: MEDICARE

## 2023-02-04 VITALS
RESPIRATION RATE: 20 BRPM | TEMPERATURE: 98.7 F | OXYGEN SATURATION: 98 % | DIASTOLIC BLOOD PRESSURE: 69 MMHG | SYSTOLIC BLOOD PRESSURE: 134 MMHG | HEART RATE: 78 BPM

## 2023-02-04 PROCEDURE — G0299 HHS/HOSPICE OF RN EA 15 MIN: HCPCS

## 2023-02-06 ENCOUNTER — HOME CARE VISIT (OUTPATIENT)
Dept: SCHEDULING | Facility: HOME HEALTH | Age: 78
End: 2023-02-06
Payer: MEDICARE

## 2023-02-06 PROCEDURE — G0299 HHS/HOSPICE OF RN EA 15 MIN: HCPCS

## 2023-02-07 VITALS
TEMPERATURE: 97.8 F | HEART RATE: 78 BPM | RESPIRATION RATE: 20 BRPM | DIASTOLIC BLOOD PRESSURE: 72 MMHG | OXYGEN SATURATION: 98 % | SYSTOLIC BLOOD PRESSURE: 122 MMHG

## 2023-02-07 VITALS
DIASTOLIC BLOOD PRESSURE: 78 MMHG | RESPIRATION RATE: 20 BRPM | OXYGEN SATURATION: 99 % | HEART RATE: 78 BPM | TEMPERATURE: 97.8 F | SYSTOLIC BLOOD PRESSURE: 130 MMHG

## 2023-02-07 NOTE — HOME HEALTH
Skilled reason for visit: s/p loop colostomy reversal wound care                Primary caregiver is pt son available Monday full day from 8am to 8pm and is able to assist with meal prep and setup, fill med box, perform wound care, grocery shopping, household chores and transportation to MD appointment           Secondary caregiver is daughter available weekends only from [address] m to 9pm and is able to assist with meal prep and setup, grocery shopping and household chores                     Medications reconciled and all medications are available in the home this visit. The following education was provided regarding medications: SN to educate patient concerning the use of gabapentin is to increased pain relief affects by using the CNS to decrease symptoms of pain and assist Tramadol or other prescribed pain medication s, even Tylenol ER in ultimate pain relief. SN to advise patient to not allow pain level to linger for long periods of time; try taking either drug 30mins or so apart, only when due at prescribed times. Medications  are effective at this time. MD notified of any discrepancies/look a like medications/medication interactions NA. Home health supplies by type and quantity ordered/de livered this visit include: wound care supplies           Patient education provided this visit to include: patient made aware to monitor for s/s of infection [increased swelling, increased redness around site, increased pain, foul smelling drainage, fever] aware who to report to/when. .             Patient level of understanding of education provided: pt verbalized understanding of education provided this visit                Sharps Education Provided: Cont ainers should be made of hard plastic, be puncture-resistant and leakproof,      such as a laundry detergent or bleach bottle.   When the container is ¾ full, it should be sealed with tape and labeled      DO NOT RECYCLE prior to discarding in the regular trash. Patient response to procedure performed:  pt tolerated without complaints of pain                Home exercise program/Homework provided: pt instructed to follow a high protein diet for h ealing- to try to get 90g protein daily. agency progress towards goal : progressing         patient progress towards goal: progressing          Pt/Caregiver instructed on plan of care and are agreeable to plan of care at this time. Discharge planning discussed with patient and caregiver. Discharge planning as follows: Patient will  be discharged once post surgical incision is healed, education has completed, patient is medically stable and pt/cg are able to independently manage medications and disease process.

## 2023-02-07 NOTE — HOME HEALTH
Skilled reason for visit: s/p loop colostomy reversal wound care                               Primary caregiver is pt son available Monday full day from 8am to 8pm and is able to assist with meal prep and setup, fill med box, perform wound care, grocery shopping, household chores and transportation to MD appointment                     Secondary caregiver is daughter available weekends only from [address] m to 9pm and is able to assist with meal prep and setup, grocery shopping and household chores                                         Medications reconciled and all medications are available in the home this visit. The following education was provided regarding medications: SN to educate patient concerning the use of gabapentin is to increased pain relief affects by using the CNS to decrease symptoms of pain and assist Tramadol or other prescribed pain medication s, even Tylenol ER in ultimate pain relief. SN to advise patient to not allow pain level to linger for long periods of time; try taking either drug 30mins or so apart, only when due at prescribed times. Medications  are effective at this time. MD notified of any discrepancies/look a like medications/medication interactions NA. Home health supplies by type and quantity ordered/de livered this visit include: wound care supplies                     Patient education provided this visit to include: patient made aware to monitor for s/s of infection [increased swelling, increased redness around site, increased pain, foul smelling drainage, fever] aware who to report to/when.            .                       Patient level of understanding of education provided: pt verbalized understanding of education provided this visit                               Sharps Education Provided: Cont ainers should be made of hard plastic, be puncture-resistant and leakproof,           such as a laundry detergent or bleach bottle. When the container is ¾ full, it should be sealed with tape and labeled           DO NOT RECYCLE prior to discarding in the regular trash. Patient response to procedure performed:  pt tolerated without complaints of pain                               Home exercise program/Homework provided: pt instructed to follow a high protein diet for h ealing- to try to get 90g protein daily. agency progress towards goal : progressing                   patient progress towards goal: progressing                    Pt/Caregiver instructed on plan of care and are agreeable to plan of care at this time. Discharge planning discussed with patient and caregiver. Discharge planning as follows: Patient will  be discharged once post surgical incision is healed, education has completed, patient is medically stable and pt/cg are able to independently manage medications and disease process.

## 2023-02-08 ENCOUNTER — HOME CARE VISIT (OUTPATIENT)
Dept: SCHEDULING | Facility: HOME HEALTH | Age: 78
End: 2023-02-08
Payer: MEDICARE

## 2023-02-08 PROCEDURE — G0299 HHS/HOSPICE OF RN EA 15 MIN: HCPCS

## 2023-02-09 ENCOUNTER — OFFICE VISIT (OUTPATIENT)
Dept: SURGERY | Age: 78
End: 2023-02-09
Payer: MEDICARE

## 2023-02-09 VITALS
SYSTOLIC BLOOD PRESSURE: 106 MMHG | HEART RATE: 82 BPM | RESPIRATION RATE: 18 BRPM | BODY MASS INDEX: 26.96 KG/M2 | OXYGEN SATURATION: 99 % | DIASTOLIC BLOOD PRESSURE: 58 MMHG | TEMPERATURE: 98.4 F | HEIGHT: 69 IN | WEIGHT: 182 LBS

## 2023-02-09 DIAGNOSIS — Z43.3 ATTENTION TO COLOSTOMY (HCC): Primary | ICD-10-CM

## 2023-02-09 DIAGNOSIS — N73.9 PELVIC ABSCESS IN FEMALE: ICD-10-CM

## 2023-02-09 PROCEDURE — 99024 POSTOP FOLLOW-UP VISIT: CPT | Performed by: COLON & RECTAL SURGERY

## 2023-02-09 NOTE — PROGRESS NOTES
intermittent claudication  Chief Complaint   Patient presents with    Post OP Follow Up     Colostomy reversal     1. Have you been to the ER, urgent care clinic since your last visit? Hospitalized since your last visit? No    2. Have you seen or consulted any other health care providers outside of the [address] [place] [address] since your last visit? Include any pap smears or colon screening.  No

## 2023-02-09 NOTE — LETTER
2/9/2023    Patient: Alexander Vieyra   YOB: 1945   Date of Visit: 2/9/2023     Teressa Chiang MD  [address] [address]  Via Fax: [address] MD  [address] [facility] 200  [address] [facility]  Via In [facility] [address]    Dear Jazmine Umaña is seen here in follow-up after loop transverse colostomy reversal.  She did very well after surgery and was discharged without complication. In the office she tells me she is tolerating a diet although she has some mild constipation. The colostomy site wound is nearly healed. She will follow-up in another 3 weeks weeks time for wound check. As part of her surgery had performed an intraoperative colonoscopy and did identify 3 adenomatous polyps, each 5 mm in size. I will have her return to you for future colonoscopy. If you have questions, please do not hesitate to call me. I look forward to following your patient along with you.       Sincerely,    Carissa Hammond MD

## 2023-02-09 NOTE — PROGRESS NOTES
Subjective: Tolerating a diet. Mildly constipated. Past medical history and ROS were reviewed and unchanged. Abdomen: Soft, nontender nondistended  Wound healing and well    Assessment / Plan    Status post loop transverse colostomy reversal and colonoscopy, history of pelvic abscess  Regular diet, Metamucil or Citrucel daily  Follow-up in 3 weeks for additional wound check  I do not see a suture in the middle of her wound, I asked her to see if the visiting nurse remove them  Intraoperative colonoscopy identified three 5 mm polyps, all adenomatous. She can follow-up with [facility] for future colonoscopy in 3 to 5 years, at his discretion. The diagnoses and plan were discussed with patient. All questions answered. Plan of care agreed to by all concerned.

## 2023-02-10 ENCOUNTER — HOME CARE VISIT (OUTPATIENT)
Dept: SCHEDULING | Facility: HOME HEALTH | Age: 78
End: 2023-02-10
Payer: MEDICARE

## 2023-02-10 VITALS
HEART RATE: 70 BPM | DIASTOLIC BLOOD PRESSURE: 72 MMHG | SYSTOLIC BLOOD PRESSURE: 130 MMHG | OXYGEN SATURATION: 99 % | TEMPERATURE: 98.2 F | RESPIRATION RATE: 20 BRPM

## 2023-02-10 PROCEDURE — G0300 HHS/HOSPICE OF LPN EA 15 MIN: HCPCS

## 2023-02-10 NOTE — HOME HEALTH
Skilled reason for visit: s/p loop colostomy reversal wound care         Primary caregiver is pt son available is able to assist with meal prep and setup, fill med box, perform wound care, grocery shopping, household chores and transportation to MD appointment      Medications reconciled and all medications are available in the home this visit. The following education was provided regarding medications: patient to continue to take medications as prescribed. patient aware to monitor for effectiveness and to notify staff of any adverse reactions to medications/any changes to medication regimen. Medications  are effective at this time. MD notified of any discrepancies/look a like medications/medication interactions NA. Home health supplies by type and quantity ordered/de livered this visit include: wound care supplies                     Patient education provided this visit to include: patient made aware to monitor for s/s of infection [increased swelling, increased redness around site, increased pain, foul smelling drainage, fever] aware who to report to/when. .                       Patient level of understanding of education provided: pt verbalized understanding of education provided this visit                               Sharps Education Provided:na                            Patient response to procedure performed:  pt tolerated without complaints of pain                               Home exercise program/Homework provided: pt instructed to follow a high protein diet for h ealing- to try to get 90g protein daily. agency progress towards goal : progressing                   patient progress towards goal: progressing                    Pt/Caregiver instructed on plan of care and are agreeable to plan of care at this time. Discharge planning discussed with patient and caregiver.   Discharge planning as follows: Patient will  be discharged once post surgical incision is healed, education has completed, patient is medically stable and pt/cg are able to independently manage medications and disease process.

## 2023-02-12 VITALS
RESPIRATION RATE: 20 BRPM | HEART RATE: 62 BPM | TEMPERATURE: 98.4 F | DIASTOLIC BLOOD PRESSURE: 59 MMHG | SYSTOLIC BLOOD PRESSURE: 132 MMHG | OXYGEN SATURATION: 99 %

## 2023-02-13 ENCOUNTER — HOME CARE VISIT (OUTPATIENT)
Dept: SCHEDULING | Facility: HOME HEALTH | Age: 78
End: 2023-02-13
Payer: MEDICARE

## 2023-02-13 VITALS
RESPIRATION RATE: 18 BRPM | DIASTOLIC BLOOD PRESSURE: 72 MMHG | HEART RATE: 90 BPM | OXYGEN SATURATION: 98 % | TEMPERATURE: 97.9 F | SYSTOLIC BLOOD PRESSURE: 122 MMHG

## 2023-02-13 PROCEDURE — G0299 HHS/HOSPICE OF RN EA 15 MIN: HCPCS

## 2023-02-13 NOTE — HOME HEALTH
Skilled reason for visit: abdomen surgical wound  Caregiver involvement: daughter for assistance as needed. Medications reviewed and all medications are available in the home this visit. The following education was provided regarding medications, medication interactions, and look alike medications (specify): no med changes noted or reported. Medications  are effective at this time. Home health supplies by type and quantity ordered/delivered this visit include: none  Patient education provided this visit:standard precautions for infection control are utilized    Progress toward goals: pt tolerated wound care well, no pain or distress noted. Home exercise program: Patient was instructed on strategies that can significantly help decrease the risk of a fall such as: Good lighting throughout the home, especially in stairwells and hallways, Non-slip floors and rugs, Hand rails on stairs, next to the toilet and in the shower and bathtub.     Continued need for the following skills: Nursing  Patient and/or caregiver notified and agrees to changes in the Plan of Care YES/NO/NA: N/A    Patient will be discharged once education and wounds if applicable has been completed, patient is medically stable, and all goals met

## 2023-02-13 NOTE — HOME HEALTH
Skilled reason for visit: DISEASE MED MANAGEMENT, PHYSICAL ASSESSMENT, VITAL SIGNS, WOUND CARE  PIC TAKEN THIS VISIT, LOCATED IN THE MEDIA SECTION  Caregiver involvement: family, iadls, adls, meal prep, med management, taking to md appointments. Medications reviewed and all medications are available in the home this visit. The following education was provided regarding medications:  patient/cg reminded to continue to take medications as prescribed. patient aware to monitor for effectiveness and to notify staff of any adverse reactions to medications/any changes to medication regimen. .    MD notified of any discrepancies/look a-like medications/medication interactions: NA  Medications are EFFECTIVE at this time. Home health supplies by type and quantity ordered/delivered this visit include: NA    Patient education provided this visit: encouraged patient to get three nutritional meals daily and to stay hydrated, drink plenty of fluids. patient made aware to monitor for s/s of infection [increased swelling, increased redness around site, increased pain, foul smelling drainage, fever] aware who to report to/when. SEE WOUND ADDENDUM AND CARE PLAN  Sharps education provided: NA    Patient level of understanding of education provided: PATIENT/CG  WAS ABLE TO REPEAT BACK AND VOICED UNDERSTANDING OF ALL EDUCATION PROVIDED.     Skilled Care Performed this visit: SAME AS REASON FOR VISIT    Patient response to procedure performed:   PATIENT TOLERATED WELL WITH NO C/O OF INCREASED PAIN OR DISCOMFORT    Agency Progress toward goals: PROGRESSING     Patient's Progress towards personal goals: progressing    Home exercise program: PATIENT TO TAKE ALL MEDS AS ORDERED, DO ICS X10/HR WHILE AWAKE, DRINK PLENTY OF FLUIDS,    Continued need for the following skills: Nursing,     Plan for next visit - wound care

## 2023-02-15 ENCOUNTER — HOME CARE VISIT (OUTPATIENT)
Dept: SCHEDULING | Facility: HOME HEALTH | Age: 78
End: 2023-02-15
Payer: MEDICARE

## 2023-02-15 PROCEDURE — G0299 HHS/HOSPICE OF RN EA 15 MIN: HCPCS

## 2023-02-15 NOTE — HOME HEALTH
Skilled reason for visit: s/p loop colostomy reversal wound care    Primary caregiver is pt son available is able to assist with meal prep and setup, fill med box, perform wound care, grocery shopping, household chores and transportation to MD appointment       Medications reconciled and all medications are available in the home this visit. The following education was provided regarding medications: patient to continue to take medications as prescribed. patient aware to monitor for effectiveness and to notify staff of any adverse reactions to medications/any changes to medication regimen. Medications  are effective at this time. MD notified of any discrepancies/look a like medications/medication interactions NA. Home health supplies by type and quantity ordered/de livered this visit include: adequate supplies       Patient education provided this visit to include: patient made aware to monitor for s/s of infection [increased swelling, increased redness around site, increased pain, foul smelling drainage, fever] aware who to report to/when. Patient level of understanding of education provided: pt verbalized understanding of education provided this visit       Sharps Education Provided:na       Patient response to procedure performed:  pt tolerated without complaints of pain       Home exercise program/Homework provided: pt instructed to follow a high protein diet for h ealing- to try to get 90g protein daily. agency progress towards goal : progressing    patient progress towards goal: progressing     Pt/Caregiver instructed on plan of care and are agreeable to plan of care at this time. Discharge planning discussed with patient and caregiver. Discharge planning as follows: Patient will  be discharged once post surgical incision is healed, education has completed, patient is medically stable and pt/cg are able to independently manage medications and disease process.

## 2023-02-17 ENCOUNTER — HOME CARE VISIT (OUTPATIENT)
Dept: SCHEDULING | Facility: HOME HEALTH | Age: 78
End: 2023-02-17
Payer: MEDICARE

## 2023-02-17 PROCEDURE — G0299 HHS/HOSPICE OF RN EA 15 MIN: HCPCS

## 2023-02-19 VITALS
RESPIRATION RATE: 16 BRPM | OXYGEN SATURATION: 96 % | DIASTOLIC BLOOD PRESSURE: 76 MMHG | TEMPERATURE: 98.8 F | HEART RATE: 76 BPM | SYSTOLIC BLOOD PRESSURE: 122 MMHG

## 2023-02-19 NOTE — HOME HEALTH
Skilled reason for visit: s/p loop colostomy reversal requiring wound care. Caregiver involvement: Patient's cg is her family. cg does not live with patient but is available as needed for assistance with iadls, adls, meal prep, medication management, taking to md appointments. Medications reviewed and all medications are available in the home this visit. Pt taking fiber gummies, added to list.  The following education was provided regarding medications, medication interactions, and look alike medications (specify): reviewed side effects, purposes, dosage, frequencies. High risk medication teaching regarding anticoagulants, hyperglycemic agents or opiod narcotics performed (specify) NA  Medications  are effective at this time. Home health supplies by type and quantity ordered/delivered this visit include: none needed at this time- patient has adequate supplies. Patient education provided this visit: patient/cg instructed to monitor for edema/increase in edema, to elevate extremity when edema occurs and to notify md if edema exceeds normal limits for patient, baseline edema for patient noted at this time. pt reminded to monitor weight daily and to report weight gain of 2lbs overnight or 5lbs in a week to MD. patient also instructed to weigh at the same time daily, first thing in the morning after urinating and wearing similar amounts of clothing each day. patient made aware to monitor for s/s of infection [increased swelling, increased redness around site, increased pain, foul smelling drainage, fever] aware who to report to/when. no s/s of infection noted. encouraged patient to get three nutritional meals daily and to stay hydrated, drink plenty of fluids. pt instructed to follow a high protein diet for healing- to try to get 90g protein daily.  Discussed dietary adjustments such as: Reduce portion sizes, Limit daily carbohydrate intake to not greater than 45-60 grams per meal for a total of <180 grams of carbohydrate daily, Avoid concentrated carbohydrates such as soft drinks, sweet tea, and sweet treats. Increase physical activity along with strength training as tolerated to facilitate weight loss and build muscle mass. pt does not monitor bs- diet controlled. discussed fall precautions in detail- having lighted hallways, removing throw rugs, monitoring medication that may alter mental status. patient aware to turn every 2 hours and to keep pressure off of bony prominences, to monitor for any pressure ulcer development/worsening. Sharps education provided: na  Patient level of understanding of education provided: patient has a good understanding of education that was provided at this time by engaging in all education provided and is able to verbalize understanding, pt denies any questions or concerns at this time. Skilled Care Performed this visit: wound care to abdomen performed per orders- old dressing completely removed, wound cleansed with ns and applied saline soaked gauze to wound bed, secured with dry dressing and tape. Patient response to procedure performed:  patient tolerated procedure with no signs of discomfort, grimacing or pain, no complications or concerns noted. Agency Progress toward goals: goals/teaching reviewed. patient is progressing towards goals at this time, skilled need to continue wound care  Patient's Progress towards personal goals: pt reporting they are progressing towards their goals at this time, feeling better every day. Home exercise program: patient instructed to perform sob hep 4-5 x daily and prn for sob, to promote lung expansion. pt also encouraged to use ICS q 2 hours and to perform sob hep during therapy.   Continued need for the following skills: Nursing  Plan for next visit: wound care  Patient and/or caregiver notified and agrees to changes in the Plan of Care NA  The following discharge planning was discussed with the pt/caregiver: Patient will be discharged once education has completed, patient is medically stable and pt/cg are able to independently manage wound care/ wound has healed or no longer requires skilled care.

## 2023-02-20 ENCOUNTER — HOME CARE VISIT (OUTPATIENT)
Dept: SCHEDULING | Facility: HOME HEALTH | Age: 78
End: 2023-02-20
Payer: MEDICARE

## 2023-02-20 VITALS
TEMPERATURE: 97.5 F | OXYGEN SATURATION: 97 % | SYSTOLIC BLOOD PRESSURE: 112 MMHG | RESPIRATION RATE: 17 BRPM | HEART RATE: 82 BPM | DIASTOLIC BLOOD PRESSURE: 66 MMHG

## 2023-02-20 PROCEDURE — G0300 HHS/HOSPICE OF LPN EA 15 MIN: HCPCS

## 2023-02-20 NOTE — HOME HEALTH
Skilled reason for visit: s/p loop colostomy reversal wound care     Caregiver involvement: Pt resides in her home, son is primary CG, pt is independent with amb, son assts with ADL's, medication, appts and transportation and is available 24/7 to asst as needed. Medications reconciled and all medications are available in the home this visit. The following education was provided regarding medications: patient to continue to take medications as prescribed. patient aware to monitor for effectiveness and to notify staff of any adverse reactions to medications/any changes to medication regimen. Medications are effective at this time. MD notified of any discrepancies/look a like medications/medication interactions NA. Home health supplies by type and quantity ordered/delivered this visit include: NA    Patient education provided this visit to include: Pt reminded to continue to monitor for s/s of infection [increased swelling, increased redness around site, increased pain, foul smelling drainage, fever] aware who to report to/when. Patient level of understanding of education provided: pt acknowledged understanding of education provided    Sharps Education Provided:NA    Patient's Progress towards personal goals: when patient reaches goals and medication is managed, disease processes are understood patient agrees and understand that discharge will take place.

## 2023-02-22 ENCOUNTER — HOME CARE VISIT (OUTPATIENT)
Dept: SCHEDULING | Facility: HOME HEALTH | Age: 78
End: 2023-02-22
Payer: MEDICARE

## 2023-02-22 PROCEDURE — G0299 HHS/HOSPICE OF RN EA 15 MIN: HCPCS

## 2023-02-24 ENCOUNTER — HOME CARE VISIT (OUTPATIENT)
Dept: SCHEDULING | Facility: HOME HEALTH | Age: 78
End: 2023-02-24
Payer: MEDICARE

## 2023-02-24 VITALS
OXYGEN SATURATION: 99 % | HEART RATE: 78 BPM | DIASTOLIC BLOOD PRESSURE: 72 MMHG | SYSTOLIC BLOOD PRESSURE: 120 MMHG | TEMPERATURE: 98.2 F | RESPIRATION RATE: 20 BRPM

## 2023-02-24 PROCEDURE — G0300 HHS/HOSPICE OF LPN EA 15 MIN: HCPCS

## 2023-02-24 NOTE — HOME HEALTH
Skilled reason for visit:wound care      Primary caregiver is pt son available is able to assist with meal prep and setup, fill med box, perform wound care, grocery shopping, household chores and transportation to MD appointment            Medications reconciled and all medications are available in the home this visit. The following education was provided regarding medications: patient to continue to take medications as prescribed. patient aware to monitor for effectiveness and to notify staff of any adverse reactions to medications/any changes to medication regimen. Medications  are effective at this time. MD notified of any discrepancies/look a like medications/medication interactions NA. Home health supplies by type and quantity ordered/de livered this visit include: adequate supplies           Patient education provided this visit to include: sn instructed patient to elevate bilateral lower extremities when at rest to promote venous return and prevent swelling                Patient level of understanding of education provided: pt verbalized understanding of education provided this visit            Sharps Education Provided:na            Patient response to procedure performed:  pt tolerated without complaints of pain            Home exercise program/Homework provided: pt instructed to follow a high protein diet for h ealing- to try to get 90g protein daily. agency progress towards goal : progressing         patient progress towards goal: progressing          Pt/Caregiver instructed on plan of care and are agreeable to plan of care at this time. Discharge planning discussed with patient and caregiver. Discharge planning as follows: Patient will  be discharged once post surgical incision is healed, education has completed, patient is medically stable and pt/cg are able to independently manage medications and disease process.

## 2023-02-26 VITALS
DIASTOLIC BLOOD PRESSURE: 67 MMHG | SYSTOLIC BLOOD PRESSURE: 132 MMHG | RESPIRATION RATE: 16 BRPM | OXYGEN SATURATION: 98 % | TEMPERATURE: 98 F | HEART RATE: 67 BPM

## 2023-02-26 NOTE — HOME HEALTH
SKILLED REASON for visit: s/p loop reversal   CAREGIVER INVOLVEMENTson is available as needed for assistance with iadls, adls, meal prep, medication management, taking to md appointments. MEDICATIONS:tylenol reviewed and all medications are available in the home this visit. Patient denies any medication changes at this time of assessment. EDUCATION PROVIDED: regarding medications, medication interactions, and look alike medications (specify): reviewed side effects, purposes, dosage, frequencies. High risk medication teaching regarding anticoagulants, hyperglycemic agents or opiod narcotics performed (specify) na Medications are effective at this time. SUPPLIES: by type and quantity ordered/delivered this visit include: n/a   PATIENT EDUCATION ON THIS VISIT: discharge instructions  PATIENT LEVEL OF UNDERSTANDING: patient/cg has a partial understanding of education that was provided at this time by engaging in all education provided and is able to verbalize understanding, pt denies any questions or concerns at this time. SKILLED CARE PERFORMED THIS VISIT- n/a open air  PATIENT RESPONSE TO PROCEDURE PERFORMED: patient tolerated procedure with no signs of discomfort, grimacing or pain, no complications or concerns noted. Agency Progress toward goals: goals/teaching reviewed. patient is progressing towards goals at this time, Patient's Progress towards personal goals: pt reporting they are progressing towards their goals at this time. Home exercise program: HEP deep breathing exercises   Continued need for the following skills: Nursing Plan for next visit: routine Patient and/or caregiver notified and agrees to changes in the Plan of Care NA The following discharge planning was discussed with the pt/caregiver: Patient will be discharged once education has completed, patient is medically stable and independent with care.

## 2023-03-02 ENCOUNTER — OFFICE VISIT (OUTPATIENT)
Age: 78
End: 2023-03-02

## 2023-03-02 ENCOUNTER — ANESTHESIA EVENT (OUTPATIENT)
Facility: HOSPITAL | Age: 78
End: 2023-03-02
Payer: MEDICARE

## 2023-03-02 VITALS
HEART RATE: 79 BPM | WEIGHT: 169 LBS | DIASTOLIC BLOOD PRESSURE: 51 MMHG | RESPIRATION RATE: 18 BRPM | BODY MASS INDEX: 25.03 KG/M2 | SYSTOLIC BLOOD PRESSURE: 132 MMHG | HEIGHT: 69 IN | OXYGEN SATURATION: 97 % | TEMPERATURE: 97.7 F

## 2023-03-02 DIAGNOSIS — T81.89XS RETAINED SUTURE, SEQUELA: Primary | ICD-10-CM

## 2023-03-02 DIAGNOSIS — Z18.9 RETAINED SUTURE, SEQUELA: Primary | ICD-10-CM

## 2023-03-02 PROCEDURE — 99024 POSTOP FOLLOW-UP VISIT: CPT | Performed by: COLON & RECTAL SURGERY

## 2023-03-02 NOTE — PROGRESS NOTES
Subjective: Tolerating diet with good bowel function. Past medical history and ROS were reviewed and unchanged. Abdomen: Soft, nontender nondistended  Wound fully healed but I do identify the loop part of the stitch    Assessment / Plan    Status post loop transverse colostomy reversal  OR for exam under anesthesia to remove the retained stitch  We will likely close primarily  Can consider excision of the scar as well    The diagnoses and plan were discussed with patient. All questions answered. Plan of care agreed to by all concerned.

## 2023-03-03 ENCOUNTER — HOSPITAL ENCOUNTER (OUTPATIENT)
Facility: HOSPITAL | Age: 78
Setting detail: OUTPATIENT SURGERY
Discharge: HOME | End: 2023-03-03
Attending: COLON & RECTAL SURGERY | Admitting: COLON & RECTAL SURGERY
Payer: MEDICARE

## 2023-03-03 ENCOUNTER — ANESTHESIA (OUTPATIENT)
Facility: HOSPITAL | Age: 78
End: 2023-03-03
Payer: MEDICARE

## 2023-03-03 VITALS
HEART RATE: 64 BPM | OXYGEN SATURATION: 99 % | WEIGHT: 169 LBS | TEMPERATURE: 97.6 F | BODY MASS INDEX: 25.03 KG/M2 | SYSTOLIC BLOOD PRESSURE: 122 MMHG | RESPIRATION RATE: 16 BRPM | DIASTOLIC BLOOD PRESSURE: 66 MMHG | HEIGHT: 69 IN

## 2023-03-03 LAB — GLUCOSE BLD STRIP.AUTO-MCNC: 106 MG/DL (ref 70–110)

## 2023-03-03 PROCEDURE — 6360000002 HC RX W HCPCS: Performed by: NURSE ANESTHETIST, CERTIFIED REGISTERED

## 2023-03-03 PROCEDURE — 2580000003 HC RX 258: Performed by: NURSE ANESTHETIST, CERTIFIED REGISTERED

## 2023-03-03 PROCEDURE — 3700000000 HC ANESTHESIA ATTENDED CARE: Performed by: COLON & RECTAL SURGERY

## 2023-03-03 PROCEDURE — 7100000000 HC PACU RECOVERY - FIRST 15 MIN: Performed by: COLON & RECTAL SURGERY

## 2023-03-03 PROCEDURE — 6370000000 HC RX 637 (ALT 250 FOR IP): Performed by: NURSE ANESTHETIST, CERTIFIED REGISTERED

## 2023-03-03 PROCEDURE — 7100000010 HC PHASE II RECOVERY - FIRST 15 MIN: Performed by: COLON & RECTAL SURGERY

## 2023-03-03 PROCEDURE — 2500000003 HC RX 250 WO HCPCS: Performed by: NURSE ANESTHETIST, CERTIFIED REGISTERED

## 2023-03-03 PROCEDURE — 3600000002 HC SURGERY LEVEL 2 BASE: Performed by: COLON & RECTAL SURGERY

## 2023-03-03 PROCEDURE — 82962 GLUCOSE BLOOD TEST: CPT

## 2023-03-03 PROCEDURE — 3700000001 HC ADD 15 MINUTES (ANESTHESIA): Performed by: COLON & RECTAL SURGERY

## 2023-03-03 PROCEDURE — 2500000003 HC RX 250 WO HCPCS: Performed by: COLON & RECTAL SURGERY

## 2023-03-03 PROCEDURE — 7100000011 HC PHASE II RECOVERY - ADDTL 15 MIN: Performed by: COLON & RECTAL SURGERY

## 2023-03-03 PROCEDURE — 15851 REMOVAL SUTR/STAPLE REQ ANES: CPT | Performed by: COLON & RECTAL SURGERY

## 2023-03-03 PROCEDURE — 2709999900 HC NON-CHARGEABLE SUPPLY: Performed by: COLON & RECTAL SURGERY

## 2023-03-03 PROCEDURE — 3600000012 HC SURGERY LEVEL 2 ADDTL 15MIN: Performed by: COLON & RECTAL SURGERY

## 2023-03-03 PROCEDURE — 7100000001 HC PACU RECOVERY - ADDTL 15 MIN: Performed by: COLON & RECTAL SURGERY

## 2023-03-03 RX ORDER — FENTANYL CITRATE 50 UG/ML
25 INJECTION, SOLUTION INTRAMUSCULAR; INTRAVENOUS EVERY 5 MIN PRN
Status: DISCONTINUED | OUTPATIENT
Start: 2023-03-03 | End: 2023-03-03 | Stop reason: HOSPADM

## 2023-03-03 RX ORDER — SODIUM CHLORIDE, SODIUM LACTATE, POTASSIUM CHLORIDE, CALCIUM CHLORIDE 600; 310; 30; 20 MG/100ML; MG/100ML; MG/100ML; MG/100ML
INJECTION, SOLUTION INTRAVENOUS CONTINUOUS
Status: DISCONTINUED | OUTPATIENT
Start: 2023-03-03 | End: 2023-03-03 | Stop reason: HOSPADM

## 2023-03-03 RX ORDER — DEXTROSE MONOHYDRATE 100 MG/ML
INJECTION, SOLUTION INTRAVENOUS CONTINUOUS PRN
Status: DISCONTINUED | OUTPATIENT
Start: 2023-03-03 | End: 2023-03-03 | Stop reason: SDUPTHER

## 2023-03-03 RX ORDER — LIDOCAINE HYDROCHLORIDE 10 MG/ML
INJECTION, SOLUTION EPIDURAL; INFILTRATION; INTRACAUDAL; PERINEURAL PRN
Status: DISCONTINUED | OUTPATIENT
Start: 2023-03-03 | End: 2023-03-03 | Stop reason: ALTCHOICE

## 2023-03-03 RX ORDER — LIDOCAINE HYDROCHLORIDE 10 MG/ML
1 INJECTION, SOLUTION EPIDURAL; INFILTRATION; INTRACAUDAL; PERINEURAL
Status: DISCONTINUED | OUTPATIENT
Start: 2023-03-03 | End: 2023-03-03 | Stop reason: HOSPADM

## 2023-03-03 RX ORDER — INSULIN LISPRO 100 [IU]/ML
0-15 INJECTION, SOLUTION INTRAVENOUS; SUBCUTANEOUS ONCE
Status: DISCONTINUED | OUTPATIENT
Start: 2023-03-03 | End: 2023-03-03 | Stop reason: SDUPTHER

## 2023-03-03 RX ORDER — ONDANSETRON 2 MG/ML
INJECTION INTRAMUSCULAR; INTRAVENOUS PRN
Status: DISCONTINUED | OUTPATIENT
Start: 2023-03-03 | End: 2023-03-03 | Stop reason: SDUPTHER

## 2023-03-03 RX ORDER — DEXTROSE MONOHYDRATE 100 MG/ML
INJECTION, SOLUTION INTRAVENOUS CONTINUOUS PRN
Status: DISCONTINUED | OUTPATIENT
Start: 2023-03-03 | End: 2023-03-03 | Stop reason: HOSPADM

## 2023-03-03 RX ORDER — DIPHENHYDRAMINE HYDROCHLORIDE 50 MG/ML
12.5 INJECTION INTRAMUSCULAR; INTRAVENOUS
Status: DISCONTINUED | OUTPATIENT
Start: 2023-03-03 | End: 2023-03-03 | Stop reason: HOSPADM

## 2023-03-03 RX ORDER — LIDOCAINE HYDROCHLORIDE 20 MG/ML
INJECTION, SOLUTION EPIDURAL; INFILTRATION; INTRACAUDAL; PERINEURAL PRN
Status: DISCONTINUED | OUTPATIENT
Start: 2023-03-03 | End: 2023-03-03 | Stop reason: SDUPTHER

## 2023-03-03 RX ORDER — INSULIN LISPRO 100 [IU]/ML
0-12 INJECTION, SOLUTION INTRAVENOUS; SUBCUTANEOUS ONCE
Status: DISCONTINUED | OUTPATIENT
Start: 2023-03-03 | End: 2023-03-03 | Stop reason: HOSPADM

## 2023-03-03 RX ORDER — FAMOTIDINE 20 MG/1
20 TABLET, FILM COATED ORAL ONCE
Status: COMPLETED | OUTPATIENT
Start: 2023-03-03 | End: 2023-03-03

## 2023-03-03 RX ORDER — ONDANSETRON 2 MG/ML
4 INJECTION INTRAMUSCULAR; INTRAVENOUS
Status: DISCONTINUED | OUTPATIENT
Start: 2023-03-03 | End: 2023-03-03 | Stop reason: HOSPADM

## 2023-03-03 RX ORDER — PROPOFOL 10 MG/ML
INJECTION, EMULSION INTRAVENOUS CONTINUOUS PRN
Status: DISCONTINUED | OUTPATIENT
Start: 2023-03-03 | End: 2023-03-03 | Stop reason: SDUPTHER

## 2023-03-03 RX ORDER — MIDAZOLAM HYDROCHLORIDE 1 MG/ML
INJECTION INTRAMUSCULAR; INTRAVENOUS PRN
Status: DISCONTINUED | OUTPATIENT
Start: 2023-03-03 | End: 2023-03-03 | Stop reason: SDUPTHER

## 2023-03-03 RX ADMIN — ONDANSETRON 4 MG: 2 INJECTION INTRAMUSCULAR; INTRAVENOUS at 07:35

## 2023-03-03 RX ADMIN — PROPOFOL 140 MCG/KG/MIN: 10 INJECTION, EMULSION INTRAVENOUS at 07:33

## 2023-03-03 RX ADMIN — LIDOCAINE HYDROCHLORIDE 50 MG: 20 INJECTION, SOLUTION EPIDURAL; INFILTRATION; INTRACAUDAL; PERINEURAL at 07:33

## 2023-03-03 RX ADMIN — SODIUM CHLORIDE, POTASSIUM CHLORIDE, SODIUM LACTATE AND CALCIUM CHLORIDE: 600; 310; 30; 20 INJECTION, SOLUTION INTRAVENOUS at 06:33

## 2023-03-03 RX ADMIN — MIDAZOLAM HYDROCHLORIDE 2 MG: 2 INJECTION, SOLUTION INTRAMUSCULAR; INTRAVENOUS at 07:28

## 2023-03-03 RX ADMIN — FAMOTIDINE 20 MG: 20 TABLET ORAL at 06:33

## 2023-03-03 ASSESSMENT — PAIN - FUNCTIONAL ASSESSMENT: PAIN_FUNCTIONAL_ASSESSMENT: 0-10

## 2023-03-03 NOTE — PROGRESS NOTES
Advance Care Planning     Advance Care Planning Inpatient Note  [facility] Department    Today's Date: 3/3/2023  [facility]    Received request from admission screening. Upon review of chart and communication with care team, patient's decision making abilities are not in question. . Patient and Child/Children was/were present in the room during visit. Goals of ACP Conversation:  Discuss advance care planning documents    Health Care Decision Makers:       Secondary Decision Maker: Ana Mei Child - [phone]  Summary:  Documented Next of Kin, per patient report    Advance Care Planning Documents (Patient Wishes):  None     Assessment:     03/03/23 0710   Encounter Summary   Encounter Overview/Reason  Initial Encounter   Service Provided For: Patient and family together   Referral/Consult From: Other    Support System Children;Family members   Last Encounter  03/03/23  (Pre-op--)   Complexity of Encounter Moderate   Begin Time 0635   End Time  0645   Total Time Calculated 10 min   Encounter    Type Pre-Op   Advance Care Planning   Type ACP conversation   Assessment/Intervention/Outcome   Assessment Coping;Calm; Hopeful   Intervention Active listening;Nurtured Hope   Outcome Coping;Encouraged   Plan and Referrals   Plan/Referrals Continue to visit, (comment)       Interventions:  Provided education on documents for clarity and greater understanding  Encouraged ongoing ACP conversation with future decision makers and loved ones    Care Preferences Communicated:   No    Outcomes/Plan:  ACP Discussion: Refused    Electronically signed by Kristian Herrera [facility] on 3/3/2023 at 7:13 AM

## 2023-03-03 NOTE — ANESTHESIA PRE PROCEDURE
Department of Anesthesiology  Preprocedure Note       Name:  Kenneth Jin   Age:  68 y.o.  :  1945                                          MRN:  [id number]         Date:  3/3/2023      Surgeon: Vanai Barrios):  Glenn Moncada MD    Procedure: Procedure(s):  SUTURE REMOVAL UNDER ANESTHESIA    Medications prior to admission:   Prior to Admission medications    Medication Sig Start Date End Date Taking? Authorizing Provider   amLODIPine (NORVASC) 5 MG tablet Take 5 mg by mouth daily 22   Ar Automatic Reconciliation   ascorbic acid (VITAMIN C) 500 MG tablet Take 500 mg by mouth daily    Ar Automatic Reconciliation   Biotin 2.5 MG CAPS Take by mouth daily    Ar Automatic Reconciliation   Calcium Carbonate-Vitamin D 600-3. 125 MG-MCG TABS Take 1 tablet by mouth daily    Ar Automatic Reconciliation   dorzolamide (TRUSOPT) 2 % ophthalmic solution Apply 1 drop to eye 2 times daily 10/31/22   Ar Automatic Reconciliation   ergocalciferol (ERGOCALCIFEROL) 1.25 MG (53052 UT) capsule Take 50,000 Units by mouth every 7 days 22   Ar Automatic Reconciliation   Ferrous Sulfate 324 MG TBEC Take 325 mg by mouth every morning (before breakfast)    Ar Automatic Reconciliation   latanoprost (XALATAN) 0.005 % ophthalmic solution Apply 1 drop to eye    Ar Automatic Reconciliation   potassium chloride (KLOR-CON M) 20 MEQ extended release tablet Take 20 mEq by mouth daily    Ar Automatic Reconciliation   simvastatin (ZOCOR) 20 MG tablet Take 20 mg by mouth    Ar Automatic Reconciliation       Current medications:    Current Facility-Administered Medications   Medication Dose Route Frequency Provider Last Rate Last Admin    lidocaine PF 1 % injection 1 mL  1 mL IntraDERmal Once PRN Oscar Mackenzie APRN - CRNA        lactated ringers IV soln infusion   IntraVENous Continuous Oscar Mackenzie APRN - CRNA 50 mL/hr at 23 0633 New Bag at 23 0633    insulin lispro (HUMALOG) injection vial 0-15 Units  0-15 Units SubCUTAneous Once Riga Mins, APRN - CRNA        glucose chewable tablet 16 g  4 tablet Oral PRN Nome Brein, APRN - CRNA        dextrose bolus 10% 125 mL  125 mL IntraVENous PRN Mary Brein, APRN - CRNA        Or    dextrose bolus 10% 250 mL  250 mL IntraVENous PRN Nome Brein, APRN - CRNA        glucagon (rDNA) injection 1 mg  1 mg SubCUTAneous PRN Mary Brein, APRN - CRNA        dextrose 10 % infusion   IntraVENous Continuous PRN Riga Mins, APRN - CRNA           Allergies:     Allergies   Allergen Reactions    Amoxicillin-Pot Clavulanate Other (See Comments)     Mouth sores    Cefepime Rash     Arm rashes, chest rashes    Ciprofloxacin Rash     Other reaction(s): mild rash/itching  Only w/ IV Cipro, takes PO Cipro without problem       Problem List:    Patient Active Problem List   Diagnosis Code    Left renal mass N28.89    Sepsis ([facility] A41.9    Abscess L02.91    History of kidney stones Z87.442    Obesity, Class II, BMI 35-39.9 E66.9    Candidal urinary tract infection B37.49    Nelson catheter in place Z97.8    Abscess of left kidney N15.1    Acute deep vein thrombosis of right lower extremity (HCC) I82.401    Pneumothorax on right J93.9    Chronic anemia D64.9    Acute cholecystitis K81.0    Hypoalbuminemia E88.09    Chronic diastolic heart failure (HCC) I50.32    Postoperative anemia due to acute blood loss D62    Impaired mobility and ADLs Z74.09, Z78.9    Glaucoma H40.9    Facial laceration S01.81XA    Colonic diverticular abscess K57.20    History of hypertension Z86.79    Dyslipidemia E78.5    Type 2 diabetes mellitus (HCC) E11.9    Citrobacter infection A49.8    Cholecystostomy care ([facility] Z43.4    Urinary tract infection N39.0    Generalized weakness R53.1    Decubitus ulcer of sacral region, stage 2 (HCC) L89.152    Anticoagulated by anticoagulation treatment Z79.01    [facility] Z43.3       Past Medical History:        Diagnosis Date    Abscess of left kidney 2015    Acute deep vein thrombosis of right lower extremity ([address] 3/02/2015    Anticoagulated by anticoagulation treatment 3/06/2015    On Rivaroxaban    Bell's palsy 2017    no drooping    Candidal urinary tract infection 3/12/2015    Chronic anemia     Chronic diastolic heart failure (HCC)     denies 10/2/19    Chronic pain     RT knee    Citrobacter infection 2/10/2015    Urinary tract infection    COVID-19 vaccine series completed     Decubitus ulcer of sacral region, stage 2 ([address]     Diabetes ([address]     no meds    Dyslipidemia     Glaucoma     Heart failure ([address]     Heart murmur     History of hypertension     History of kidney stones 2012    Hypertension     no medications    Hypoalbuminemia     Liver disease     non alcoholic cirrhosis    Obesity, Class II, BMI 35-39.9     Pneumothorax on right 2015    Postoperative anemia due to acute blood loss     Sepsis ([address]     Thrombocytopenia ([address]     Dr. Miya Morejon       Past Surgical History:        Procedure Laterality Date     SECTION      COLONOSCOPY N/A 2022    COLONOSCOPY/ Polypectomy/ Balloon Dilation performed by Monisha Marin MD at [address] COLONOSCOPY N/A 2023    COLONOSCOPY *ENDO TECH NEEDED* performed by Logan Acevedo MD at [address]  2015    KIDNEY BIOPSY Left 2015    S/P CT-guided left lower renal pole biopsy (2015 - Dr. El Shelton)    LITHOTRIPSY      5 times    OTHER SURGICAL HISTORY  1977    excision nodule thyroid    OTHER SURGICAL HISTORY  2015    S/P CT-guided cholecystostomy tube placement (2015 - Dr. El Shelton)    OTHER SURGICAL HISTORY Left 2015    S/P CT-guided left lower renal pole abscess drainage and drainage catheter placement (2015 - Dr. El Shelton)    OTHER SURGICAL HISTORY Left 2015    S/P Percutaneous drainage of abscess via anterior abdomen (2015 - Dr. Chuck Ku Marcia)    OTHER SURGICAL HISTORY Right 2/27/2015    S/P Right chest tube insertion (2/27/2015 - Dr. Jossie Gonzales)   [facility]  2015    Colostomy       Social History:    Social History     Tobacco Use    Smoking status: Never    Smokeless tobacco: Never   Substance Use Topics    Alcohol use: Never                                Counseling given: Not Answered      Vital Signs (Current):   Vitals:    03/03/23 0637   BP: (!) 153/76   Pulse: 74   Resp: 18   Temp: 97.6 °F (36.4 °C)   TempSrc: Oral   SpO2: 98%   Weight: 169 lb (76.7 kg)   Height: 5' 9\" (1.753 m)                                              BP Readings from Last 3 Encounters:   03/03/23 (!) 153/76   03/02/23 (!) 132/51   02/20/23 112/66       NPO Status: Time of last liquid consumption: 2359                        Time of last solid consumption: 2359                        Date of last liquid consumption: 03/02/23                        Date of last solid food consumption: 03/02/23    BMI:   Wt Readings from Last 3 Encounters:   03/03/23 169 lb (76.7 kg)   03/02/23 169 lb (76.7 kg)   02/09/23 182 lb (82.6 kg)     Body mass index is 24.96 kg/m². CBC:   Lab Results   Component Value Date/Time    WBC 2.5 01/28/2023 03:18 AM    RBC 3.13 01/28/2023 03:18 AM    HGB 9.1 01/28/2023 03:18 AM    HCT 27.7 01/28/2023 03:18 AM    MCV 88.5 01/28/2023 03:18 AM    RDW 14.4 01/28/2023 03:18 AM    PLT 55 01/28/2023 03:18 AM       CMP:   Lab Results   Component Value Date/Time     01/28/2023 03:18 AM    K 3.6 01/28/2023 03:18 AM     01/28/2023 03:18 AM    CO2 26 01/28/2023 03:18 AM    BUN 29 01/28/2023 03:18 AM    CREATININE 1.19 01/28/2023 03:18 AM    GFRAA >60 12/12/2021 03:10 PM    GLUCOSE 96 01/28/2023 03:18 AM    CALCIUM 7.7 01/28/2023 03:18 AM       POC Tests: No results for input(s): POCGLU, POCNA, POCK, POCCL, POCBUN, POCHEMO, POCHCT in the last 72 hours.     Coags:   Lab Results   Component Value Date/Time    PROTIME 14.5 12/12/2022 10:29 AM    INR 1.1 12/12/2022 10:29 AM    APTT 22.4 12/12/2022 10:29 AM       HCG (If Applicable): No results found for: PREGTESTUR, PREGSERUM, HCG, HCGQUANT     ABGs: No results found for: PHART, PO2ART, LOQ5LWL, UWD3UHY, BEART, D0CUMABM     Type & Screen (If Applicable):  No results found for: LABABO, LABRH    Drug/Infectious Status (If Applicable):  No results found for: HIV, HEPCAB    COVID-19 Screening (If Applicable):   Lab Results   Component Value Date/Time    COVID19 Not detected 07/20/2022 08:22 AM    COVID19 Not Detected 06/25/2021 01:04 PM           Anesthesia Evaluation  Patient summary reviewed  Airway: Mallampati: II  TM distance: >3 FB   Neck ROM: full  Mouth opening: > = 3 FB   Dental: normal exam   (+) lower dentures      Pulmonary:Negative Pulmonary ROS and normal exam                               Cardiovascular:  Exercise tolerance: good (>4 METS),   (+) hypertension: moderate,                   Neuro/Psych:                ROS comment: H/O [place] palsy GI/Hepatic/Renal:            ROS comment: H/o Diverticulosis and Colostomy with reversal.   Endo/Other:    (+) Diabetes (Diet controlled), . Abdominal:             Vascular: Other Findings:           Anesthesia Plan      general     ASA 2       Induction: intravenous. Anesthetic plan and risks discussed with patient. Plan discussed with CRNA.                     Alexy Palacios MD   3/3/2023

## 2023-03-03 NOTE — OP NOTE
[address] [place] [address]   OPERATIVE REPORT    Name:  Dalila Murillo  MR#:   [id number]  :  1945  ACCOUNT #:  [de-identified]  DATE OF SERVICE:  2023    PREOPERATIVE DIAGNOSIS:  Retained suture. POSTOPERATIVE DIAGNOSIS:  Retained suture. PROCEDURE PERFORMED:  Suture removal under anesthesia. SURGEON:  Mery Lerma MD    ASSISTANT:  Stephanie Salinas. ANESTHESIA:  MAC.    COMPLICATIONS:  None. SPECIMENS REMOVED:  Suture. IMPLANTS:  None. ESTIMATED BLOOD LOSS:  Minimal.    FINDINGS:  Suture in the dermis. INDICATIONS:  The patient is a 77-year-old woman who had a colostomy reversal.  She presents now for a retained suture removal, which had burrowed underneath the skin and was unable to be removed in the office. I explained the risks including infection. She understood and wished to proceed. PROCEDURE:  The patient was properly identified in the holding area and brought to the operating room. She was laid supine on the operating room table. Sedation was administered by Anesthesia. Abdomen was prepped and draped in the usual sterile fashion. I made a 2-cm incision over the previous colostomy site incision and identified the suture in the subcutaneous tissues. It had burrowed underneath the skin at the superior portion of the wound. The suture was removed intact including the knot and both limbs. The wound was irrigated and closed with interrupted 2-0 nylon sutures. Dry dressings were applied. The patient tolerated the procedure well. All instrument, sponge, and needle counts were correct at the end of the case x2. The patient awoke and was transported to PACU in stable condition.       Donte Evans MD      [id number]  D:  2023 7:51  T:  2023 16:05  JOB #:  [id number]

## 2023-03-03 NOTE — DISCHARGE INSTRUCTIONS
Remove dressing in 48 hours  May shower in 48 hours  Follow up in office 2 weeks  Tylenol or ibuprofen for pain  DISCHARGE SUMMARY from Nurse    PATIENT INSTRUCTIONS:    After general anesthesia or intravenous sedation, for 24 hours or while taking prescription Narcotics:  Limit your activities  Do not drive and operate hazardous machinery  Do not make important personal or business decisions  Do  not drink alcoholic beverages  If you have not urinated within 8 hours after discharge, please contact your surgeon on call. Report the following to your surgeon:  Excessive pain, swelling, redness or odor of or around the surgical area  Temperature over 100.5  Nausea and vomiting lasting longer than 4 hours or if unable to take medications  Any signs of decreased circulation or nerve impairment to extremity: change in color, persistent  numbness, tingling, coldness or increase pain  Any questions    What to do at Home:  Recommended activity: {discharge activity:83960}, ***    If you experience any of the following symptoms ***, please follow up with ***. *  Please give a list of your current medications to your Primary Care Provider. *  Please update this list whenever your medications are discontinued, doses are      changed, or new medications (including over-the-counter products) are added. *  Please carry medication information at all times in case of emergency situations. These are general instructions for a healthy lifestyle:    No smoking/ No tobacco products/ Avoid exposure to second hand smoke  Surgeon General's Warning:  Quitting smoking now greatly reduces serious risk to your health.     Obesity, smoking, and sedentary lifestyle greatly increases your risk for illness    A healthy diet, regular physical exercise & weight monitoring are important for maintaining a healthy lifestyle    You may be retaining fluid if you have a history of heart failure or if you experience any of the following symptoms:  Weight gain of 3 pounds or more overnight or 5 pounds in a week, increased swelling in our hands or feet or shortness of breath while lying flat in bed. Please call your doctor as soon as you notice any of these symptoms; do not wait until your next office visit. The discharge information has been reviewed with the {PATIENT PARENT GUARDIAN:64681}. The {PATIENT PARENT GUARDIAN:69927} verbalized understanding. Discharge medications reviewed with the {[facility] reviewed [facility]:69634} and appropriate educational materials and side effects teaching were provided.   ___________________________________________________________________________________________________________________________________

## 2023-03-03 NOTE — ANESTHESIA POSTPROCEDURE EVALUATION
Department of Anesthesiology  Postprocedure Note    Patient: Ariadne Sykes  MRN: [id number]  YOB: 1945  Date of evaluation: 3/3/2023      Procedure Summary     Date: 03/03/23 Room / Location: [address] [place] [address] 01 / [address] OR    Anesthesia Start: 0508 Anesthesia Stop: 3474    Procedure: SUTURE REMOVAL UNDER ANESTHESIA (Abdomen) Diagnosis:       Retained suture, initial encounter      (Retained suture, initial encounter [T81.89XA, Z18.9])    Surgeons: Kory De Leon MD Responsible Provider: Daniel Rodriguez MD    Anesthesia Type: MAC ASA Status: 2          Anesthesia Type: No value filed.     Court Phase I: Court Score: 10    Court Phase II: Court Score: 10      Anesthesia Post Evaluation    Patient location during evaluation: PACU  Patient participation: complete - patient participated  Level of consciousness: awake and alert  Airway patency: patent  Nausea & Vomiting: no nausea and no vomiting  Complications: no  Cardiovascular status: hemodynamically stable  Respiratory status: acceptable  Hydration status: stable  Multimodal analgesia pain management approach

## 2023-03-03 NOTE — H&P
HPI: Deric Ramirez is a 68 y.o. female presenting with chief complain of retained suture    Past Medical History:   Diagnosis Date    Abscess of left kidney 2015    Acute deep vein thrombosis of right lower extremity ([address] 3/02/2015    Anticoagulated by anticoagulation treatment 3/06/2015    On Rivaroxaban    Bell's palsy 2017    no drooping    Candidal urinary tract infection 3/12/2015    Chronic anemia     Chronic diastolic heart failure ([address]     denies 10/2/19    Chronic pain     RT knee    Citrobacter infection 2/10/2015    Urinary tract infection    COVID-19 vaccine series completed     Decubitus ulcer of sacral region, stage 2 ([address]     Diabetes ([address]     no meds    Dyslipidemia     Glaucoma     Heart failure (HCC)     Heart murmur     History of hypertension     History of kidney stones 2012    Hypertension     no medications    Hypoalbuminemia     Liver disease     non alcoholic cirrhosis    Obesity, Class II, BMI 35-39.9     Pneumothorax on right 2015    Postoperative anemia due to acute blood loss     Sepsis ([address]     Thrombocytopenia ([address]     Dr. Carine Ríos       Past Surgical History:   Procedure Laterality Date     SECTION      COLONOSCOPY N/A 2022    COLONOSCOPY/ Polypectomy/ Balloon Dilation performed by Modesto Mayorga MD at [facility]COPY    COLONOSCOPY N/A 2023    COLONOSCOPY *ENDO TECH NEEDED* performed by Ann Morales MD at [address] [place] [address]  2015    KIDNEY BIOPSY Left 2015    S/P CT-guided left lower renal pole biopsy (2015 - Dr. Mary Coffey)    LITHOTRIPSY      5 times    OTHER SURGICAL HISTORY  1977    excision nodule thyroid    OTHER SURGICAL HISTORY  2015    S/P CT-guided cholecystostomy tube placement (2015 - Dr. Mary Coffey)    OTHER SURGICAL HISTORY Left 2015    S/P CT-guided left lower renal pole abscess drainage and drainage catheter placement (2015 - Dr. Mary Coffey)    OTHER SURGICAL HISTORY Left 2/17/2015    S/P Percutaneous drainage of abscess via anterior abdomen (2/17/2015 - Dr. Charline Velez)    OTHER SURGICAL HISTORY Right 2/27/2015    S/P Right chest tube insertion (2/27/2015 - Dr. Charline Velez)    [address]  2015    Colostomy       Family History   Problem Relation Age of Onset    Breast Cancer Paternal Aunt     Heart Attack Brother     Heart Attack Father        Social History     Socioeconomic History    Marital status:      Spouse name: None    Number of children: None    Years of education: None    Highest education level: None   Tobacco Use    Smoking status: Never    Smokeless tobacco: Never   Substance and Sexual Activity    Alcohol use: Never    Drug use: Never       Current Outpatient Medications   Medication Instructions    amLODIPine (NORVASC) 5 mg, Oral, DAILY    ascorbic acid (VITAMIN C) 500 mg, Oral, DAILY    Biotin 2.5 MG CAPS Oral, DAILY    Calcium Carbonate-Vitamin D 600-3. 125 MG-MCG TABS 1 tablet, Oral, DAILY    dorzolamide (TRUSOPT) 2 % ophthalmic solution 1 drop, Ophthalmic, 2 TIMES DAILY    ergocalciferol (ERGOCALCIFEROL) 50,000 Units, Oral, EVERY 7 DAYS    Ferrous Sulfate 325 mg, Oral, DAILY BEFORE BREAKFAST    latanoprost (XALATAN) 0.005 % ophthalmic solution 1 drop, Ophthalmic    potassium chloride (KLOR-CON M) 20 MEQ extended release tablet 20 mEq, Oral, DAILY    simvastatin (ZOCOR) 20 mg, Oral        Allergies   Allergen Reactions    Amoxicillin-Pot Clavulanate Other (See Comments)     Mouth sores    Cefepime Rash     Arm rashes, chest rashes    Ciprofloxacin Rash     Other reaction(s): mild rash/itching  Only w/ IV Cipro, takes PO Cipro without problem       ROS: negative    Vitals:    03/03/23 0637   BP: (!) 153/76   Pulse: 74   Resp: 18   Temp: 97.6 °F (36.4 °C)   SpO2: 98%       Physical Exam  Constitutional:       Appearance: She is well-developed. HENT:      Head: Normocephalic and atraumatic. Abdominal:      General: There is no distension. Palpations: Abdomen is soft. Tenderness: There is no abdominal tenderness. Skin:     General: Skin is warm and dry. Lab Results   Component Value Date    WBC 2.5 (L) 01/28/2023    HGB 9.1 (L) 01/28/2023    HCT 27.7 (L) 01/28/2023    MCV 88.5 01/28/2023    PLT 55 (L) 01/28/2023     Lab Results   Component Value Date/Time     01/28/2023 03:18 AM    K 3.6 01/28/2023 03:18 AM     01/28/2023 03:18 AM    CO2 26 01/28/2023 03:18 AM    BUN 29 01/28/2023 03:18 AM    CREATININE 1.19 01/28/2023 03:18 AM    GLUCOSE 96 01/28/2023 03:18 AM    CALCIUM 7.7 01/28/2023 03:18 AM      Lab Results   Component Value Date/Time    MG 1.9 01/28/2023 03:18 AM     Lab Results   Component Value Date    CALCIUM 7.7 (L) 01/28/2023    PHOS 2.1 (L) 01/28/2023       Assessment / Plan    Suture removal    The diagnoses and plan were discussed with the patient. All questions answered. Plan of care agreed to by all concerned.

## 2023-03-03 NOTE — BRIEF OP NOTE
Brief Postoperative Note      Patient: Leopoldo Amble  YOB: 1945  MRN: [id number]    Date of Procedure: 3/3/2023    Pre-Op Diagnosis: Retained suture, initial encounter [T81.89XA, Z18.9]    Post-Op Diagnosis: Same       Procedure(s):  SUTURE REMOVAL UNDER ANESTHESIA    Surgeon(s):  Emily Briscoe MD    Assistant:  Surgical Assistant: Anat Kohli    Anesthesia: Monitor Anesthesia Care    Estimated Blood Loss (mL): Minimal    Complications: None    Specimens:   * No specimens in log *    Implants:  * No implants in log *      Drains: * No LDAs found *    Findings: suture    [id number]    Electronically signed by Emily Briscoe MD on 3/3/2023 at 7:49 AM

## 2023-03-03 NOTE — ANESTHESIA PRE PROCEDURE
Department of Anesthesiology  Preprocedure Note       Name:  Mili Sigala   Age:  68 y.o.  :  1945                                          MRN:  [id number]         Date:  3/3/2023      Surgeon: Khadar Suazo):  Johny Aparicio MD    Procedure: Procedure(s):  SUTURE REMOVAL UNDER ANESTHESIA    Medications prior to admission:   Prior to Admission medications    Medication Sig Start Date End Date Taking? Authorizing Provider   amLODIPine (NORVASC) 5 MG tablet Take 5 mg by mouth daily 22   Ar Automatic Reconciliation   ascorbic acid (VITAMIN C) 500 MG tablet Take 500 mg by mouth daily    Ar Automatic Reconciliation   Biotin 2.5 MG CAPS Take by mouth daily    Ar Automatic Reconciliation   Calcium Carbonate-Vitamin D 600-3. 125 MG-MCG TABS Take 1 tablet by mouth daily    Ar Automatic Reconciliation   dorzolamide (TRUSOPT) 2 % ophthalmic solution Apply 1 drop to eye 2 times daily 10/31/22   Ar Automatic Reconciliation   ergocalciferol (ERGOCALCIFEROL) 1.25 MG (33718 UT) capsule Take 50,000 Units by mouth every 7 days 22   Ar Automatic Reconciliation   Ferrous Sulfate 324 MG TBEC Take 325 mg by mouth every morning (before breakfast)    Ar Automatic Reconciliation   latanoprost (XALATAN) 0.005 % ophthalmic solution Apply 1 drop to eye    Ar Automatic Reconciliation   potassium chloride (KLOR-CON M) 20 MEQ extended release tablet Take 20 mEq by mouth daily    Ar Automatic Reconciliation   simvastatin (ZOCOR) 20 MG tablet Take 20 mg by mouth    Ar Automatic Reconciliation       Current medications:    Current Facility-Administered Medications   Medication Dose Route Frequency Provider Last Rate Last Admin    lidocaine PF 1 % injection 1 mL  1 mL IntraDERmal Once PRN Wilene Spine, APRN - CRNA        lactated ringers IV soln infusion   IntraVENous Continuous Wilene Spine, APRN - CRNA 50 mL/hr at 23 0729 NoRateChange at 23 0729    glucose chewable tablet 16 g  4 tablet Oral PRN Wilene Spine, APRN - CRNA        dextrose bolus 10% 125 mL  125 mL IntraVENous PRN Wells Brein, APRN - CRNA        Or    dextrose bolus 10% 250 mL  250 mL IntraVENous PRN Mary Brein, APRN - CRNA        glucagon (rDNA) injection 1 mg  1 mg SubCUTAneous PRN Wells Brein, APRN - CRNA        dextrose 10 % infusion   IntraVENous Continuous PRN Wells Brein, APRN - CRNA        insulin lispro (HUMALOG) injection vial 0-12 Units  0-12 Units SubCUTAneous Once Amador Cowan, APRN - PEDRO         Facility-Administered Medications Ordered in Other Encounters   Medication Dose Route Frequency Provider Last Rate Last Admin    midazolam (VERSED) injection   IntraVENous PRN Amador Livings, APRN - CRNA   2 mg at 03/03/23 0728    lidocaine PF 2 % injection   IntraVENous PRN Amador Livings, APRN - CRNA   50 mg at 03/03/23 4450    propofol injection   IntraVENous Continuous PRN Amadorzaynab Cowan, APRN - CRNA 51.166 mL/hr at 03/03/23 0733 140 mcg/kg/min at 03/03/23 0733    ondansetron (ZOFRAN) injection   IntraVENous PRN Amador Camryn, APRN - CRNA   4 mg at 03/03/23 2740       Allergies:     Allergies   Allergen Reactions    Amoxicillin-Pot Clavulanate Other (See Comments)     Mouth sores    Cefepime Rash     Arm rashes, chest rashes    Ciprofloxacin Rash     Other reaction(s): mild rash/itching  Only w/ IV Cipro, takes PO Cipro without problem       Problem List:    Patient Active Problem List   Diagnosis Code    Left renal mass N28.89    Sepsis [facility] A41.9    Abscess L02.91    History of kidney stones Z87.442    Obesity, Class II, BMI 35-39.9 E66.9    Candidal urinary tract infection B37.49    Nelson catheter in place Z97.8    Abscess of left kidney N15.1    Acute deep vein thrombosis of right lower extremity (HCC) I82.401    Pneumothorax on right J93.9    Chronic anemia D64.9    Acute cholecystitis K81.0    Hypoalbuminemia E88.09    Chronic diastolic heart failure (HCC) I50.32    Postoperative anemia due to acute blood loss D62    Impaired mobility and ADLs Z74.09, Z78.9    Glaucoma H40.9    Facial laceration S01.81XA    Colonic diverticular abscess K57.20    History of hypertension Z86.79    Dyslipidemia E78.5    Type 2 diabetes mellitus (HCC) E11.9    Citrobacter infection A49.8    Cholecystostomy care ([facility] Z43.4    Urinary tract infection N39.0    Generalized weakness R53.1    Decubitus ulcer of sacral region, stage 2 (HCC) L89.152    Anticoagulated by anticoagulation treatment Z79.01    Attention to colostomy ([address] Z43.3       Past Medical History:        Diagnosis Date    Abscess of left kidney 2015    Acute deep vein thrombosis of right lower extremity ([address] 3/02/2015    Anticoagulated by anticoagulation treatment 3/06/2015    On Rivaroxaban    Bell's palsy 2017    no drooping    Candidal urinary tract infection 3/12/2015    Chronic anemia     Chronic diastolic heart failure (HCC)     denies 10/2/19    Chronic pain     RT knee    Citrobacter infection 2/10/2015    Urinary tract infection    COVID-19 vaccine series completed     Decubitus ulcer of sacral region, stage 2 ([facility]     Diabetes (HCC)     no meds    Dyslipidemia     Glaucoma     Heart failure ([address]     Heart murmur     History of hypertension     History of kidney stones 2012    Hypertension     no medications    Hypoalbuminemia     Liver disease     non alcoholic cirrhosis    Obesity, Class II, BMI 35-39.9     Pneumothorax on right 2015    Postoperative anemia due to acute blood loss     Sepsis ([facility]     Thrombocytopenia (HCC)     Dr. Wendy Osullivan       Past Surgical History:        Procedure Laterality Date     SECTION      COLONOSCOPY N/A 2022    COLONOSCOPY/ Polypectomy/ Balloon Dilation performed by Chun Blanco MD at [address] COLONOSCOPY N/A 2023    COLONOSCOPY *ENDO TECH NEEDED* performed by Massimo Juárez MD at [facility]  2015    KIDNEY BIOPSY Left 2/16/2015    S/P CT-guided left lower renal pole biopsy (2/16/2015 - Dr. Madisyn Green)    LITHOTRIPSY      5 times    OTHER SURGICAL HISTORY  8/1977    excision nodule thyroid    OTHER SURGICAL HISTORY  1/26/2015    S/P CT-guided cholecystostomy tube placement (1/26/2015 - Dr. Madisyn Green)   [facility] Left 2/16/2015    S/P CT-guided left lower renal pole abscess drainage and drainage catheter placement (2/16/2015 - Dr. Madisyn Green)   [facility] Left 2/17/2015    S/P Percutaneous drainage of abscess via anterior abdomen (2/17/2015 - Dr. Sintia Knox)    OTHER SURGICAL HISTORY Right 2/27/2015    S/P Right chest tube insertion (2/27/2015 - Dr. Sintia Knox)   [facility]  2015    Colostomy       Social History:    Social History     Tobacco Use    Smoking status: Never    Smokeless tobacco: Never   Substance Use Topics    Alcohol use: Never                                Counseling given: Not Answered      Vital Signs (Current):   Vitals:    03/03/23 0637   BP: (!) 153/76   Pulse: 74   Resp: 18   Temp: 97.6 °F (36.4 °C)   TempSrc: Oral   SpO2: 98%   Weight: 169 lb (76.7 kg)   Height: 5' 9\" (1.753 m)                                              BP Readings from Last 3 Encounters:   03/03/23 (!) 153/76   03/02/23 (!) 132/51   02/20/23 112/66       NPO Status: Time of last liquid consumption: 2359                        Time of last solid consumption: 2359                        Date of last liquid consumption: 03/02/23                        Date of last solid food consumption: 03/02/23    BMI:   Wt Readings from Last 3 Encounters:   03/03/23 169 lb (76.7 kg)   03/02/23 169 lb (76.7 kg)   02/09/23 182 lb (82.6 kg)     Body mass index is 24.96 kg/m².     CBC:   Lab Results   Component Value Date/Time    WBC 2.5 01/28/2023 03:18 AM    RBC 3.13 01/28/2023 03:18 AM    HGB 9.1 01/28/2023 03:18 AM    HCT 27.7 01/28/2023 03:18 AM    MCV 88.5 01/28/2023 03:18 AM    RDW 14.4 01/28/2023 03:18 AM    PLT 55 01/28/2023 03:18 AM       CMP:   Lab Results   Component Value Date/Time     01/28/2023 03:18 AM    K 3.6 01/28/2023 03:18 AM     01/28/2023 03:18 AM    CO2 26 01/28/2023 03:18 AM    BUN 29 01/28/2023 03:18 AM    CREATININE 1.19 01/28/2023 03:18 AM    GFRAA >60 12/12/2021 03:10 PM    GLUCOSE 96 01/28/2023 03:18 AM    CALCIUM 7.7 01/28/2023 03:18 AM       POC Tests: No results for input(s): POCGLU, POCNA, POCK, POCCL, POCBUN, POCHEMO, POCHCT in the last 72 hours. Coags:   Lab Results   Component Value Date/Time    PROTIME 14.5 12/12/2022 10:29 AM    INR 1.1 12/12/2022 10:29 AM    APTT 22.4 12/12/2022 10:29 AM       HCG (If Applicable): No results found for: PREGTESTUR, PREGSERUM, HCG, HCGQUANT     ABGs: No results found for: PHART, PO2ART, LUB3VOW, IMI4CFI, BEART, T3GTUBGP     Type & Screen (If Applicable):  No results found for: LABABO, LABRH    Drug/Infectious Status (If Applicable):  No results found for: HIV, HEPCAB    COVID-19 Screening (If Applicable):   Lab Results   Component Value Date/Time    COVID19 Not detected 07/20/2022 08:22 AM    COVID19 Not Detected 06/25/2021 01:04 PM           Anesthesia Evaluation    Airway:           Dental:          Pulmonary:   (+) COPD:                             Cardiovascular:                      Neuro/Psych:               GI/Hepatic/Renal:             Endo/Other:                     Abdominal:             Vascular:           Other Findings:           Anesthesia Plan      HERNAN Duarte MD   3/3/2023

## 2023-03-20 ENCOUNTER — OFFICE VISIT (OUTPATIENT)
Age: 78
End: 2023-03-20

## 2023-03-20 VITALS
WEIGHT: 170 LBS | BODY MASS INDEX: 25.18 KG/M2 | DIASTOLIC BLOOD PRESSURE: 76 MMHG | SYSTOLIC BLOOD PRESSURE: 138 MMHG | HEIGHT: 69 IN | OXYGEN SATURATION: 99 % | TEMPERATURE: 98.2 F | HEART RATE: 75 BPM | RESPIRATION RATE: 16 BRPM

## 2023-03-20 DIAGNOSIS — Z18.9 RETAINED SUTURE, SEQUELA: Primary | ICD-10-CM

## 2023-03-20 DIAGNOSIS — Z43.3 ENCOUNTER FOR ATTENTION TO COLOSTOMY (HCC): ICD-10-CM

## 2023-03-20 DIAGNOSIS — T81.89XS RETAINED SUTURE, SEQUELA: Primary | ICD-10-CM

## 2023-03-20 PROCEDURE — 99024 POSTOP FOLLOW-UP VISIT: CPT | Performed by: COLON & RECTAL SURGERY

## 2023-03-20 NOTE — PROGRESS NOTES
Subjective: No issues    Past medical history and ROS were reviewed and unchanged. Sutures removed    Assessment / Plan    Status post suture removal in the OR for retained suture after his wrist colostomy reversal  Follow-up as needed    The diagnoses and plan were discussed with patient. All questions answered. Plan of care agreed to by all concerned.

## 2023-03-20 NOTE — PROGRESS NOTES
Tawanda Tomas is a 68 y.o. female  Chief Complaint   Patient presents with    Follow-up     3/3/23 sure removal s/p colostomy reversal

## 2023-03-28 ENCOUNTER — HOME CARE VISIT (OUTPATIENT)
Dept: HOME HEALTH SERVICES | Facility: HOME HEALTH | Age: 78
End: 2023-03-28

## 2023-03-28 NOTE — Clinical Note
Therese Gore discharged from [facility] as of 3/28/23 education has been completed, patient is medically stable and  able to independently medication regimen and disease process/ post op incision has healed and pt no longer requires skilled care.  Thank you     Kobi Deras RN

## 2023-03-29 NOTE — HOME HEALTH
Skilled reason for visit:[facility] non billable           Primary caregiver is pt son available is able to assist with meal prep and setup, fill med box, perform wound care, grocery shopping, household chores and transportation to MD appointment                   The following education was provided regarding medications: patient to continue to take medications as prescribed. patient aware to monitor for effectiveness and to notify staff of any adverse reactions to medications/any changes to medication regimen. Medications  are effective           MD notified of any discrepancies/look a like medications/medication interactions NAWesly Olvera Education Provided:sarah       Home exercise program/Homework providedfollow up with PCP   agency progress towards goal : met       patient progress towards goal: met         Discharge planning discussed with patient and caregiver. Discharge planning as follows:Patient  discharged 3/28/23 education has been completed, patient is medically stable and pt is able to independently medication regimen and disease process/ wound has healed and no longer requires skilled care.

## 2023-04-05 DIAGNOSIS — I35.0 NONRHEUMATIC AORTIC (VALVE) STENOSIS: Primary | ICD-10-CM

## 2023-04-05 NOTE — PROGRESS NOTES
Verbal order and read back per Evorn Peter, DO  Dr Hartman Alert no longer with [facility]  Please refer to Dr. Анна Durham

## 2023-05-19 ENCOUNTER — TRANSCRIBE ORDERS (OUTPATIENT)
Facility: HOSPITAL | Age: 78
End: 2023-05-19

## 2023-05-19 DIAGNOSIS — D69.59 SECONDARY THROMBOCYTOPENIA: ICD-10-CM

## 2023-05-19 DIAGNOSIS — K75.81 NONALCOHOLIC STEATOHEPATITIS (NASH): Primary | ICD-10-CM

## 2023-05-19 DIAGNOSIS — I85.00 ESOPHAGEAL VARICES WITHOUT BLEEDING, UNSPECIFIED ESOPHAGEAL VARICES TYPE (HCC): ICD-10-CM

## 2023-06-01 ENCOUNTER — HOSPITAL ENCOUNTER (OUTPATIENT)
Facility: HOSPITAL | Age: 78
End: 2023-06-01
Payer: MEDICARE

## 2023-06-01 ENCOUNTER — HOSPITAL ENCOUNTER (OUTPATIENT)
Facility: HOSPITAL | Age: 78
Discharge: HOME | End: 2023-06-01
Payer: MEDICARE

## 2023-06-01 DIAGNOSIS — K75.81 NONALCOHOLIC STEATOHEPATITIS (NASH): ICD-10-CM

## 2023-06-01 DIAGNOSIS — Z12.31 VISIT FOR SCREENING MAMMOGRAM: ICD-10-CM

## 2023-06-01 DIAGNOSIS — D69.59 SECONDARY THROMBOCYTOPENIA: ICD-10-CM

## 2023-06-01 DIAGNOSIS — I85.00 ESOPHAGEAL VARICES WITHOUT BLEEDING, UNSPECIFIED ESOPHAGEAL VARICES TYPE (HCC): ICD-10-CM

## 2023-06-01 PROCEDURE — 76705 ECHO EXAM OF ABDOMEN: CPT

## 2023-06-01 PROCEDURE — 77063 BREAST TOMOSYNTHESIS BI: CPT

## 2023-10-09 ENCOUNTER — HOSPITAL ENCOUNTER (OUTPATIENT)
Facility: HOSPITAL | Age: 78
Discharge: HOME | End: 2023-10-12
Payer: MEDICARE

## 2023-10-09 DIAGNOSIS — E28.39 MENOPAUSE OVARIAN FAILURE: ICD-10-CM

## 2023-10-09 PROCEDURE — 77080 DXA BONE DENSITY AXIAL: CPT

## 2023-11-08 ENCOUNTER — OFFICE VISIT (OUTPATIENT)
Age: 78
End: 2023-11-08
Payer: MEDICARE

## 2023-11-08 VITALS
HEIGHT: 69 IN | HEART RATE: 71 BPM | DIASTOLIC BLOOD PRESSURE: 60 MMHG | SYSTOLIC BLOOD PRESSURE: 128 MMHG | OXYGEN SATURATION: 95 % | WEIGHT: 178 LBS | BODY MASS INDEX: 26.36 KG/M2

## 2023-11-08 DIAGNOSIS — I35.0 NONRHEUMATIC AORTIC (VALVE) STENOSIS: ICD-10-CM

## 2023-11-08 DIAGNOSIS — R00.2 PALPITATIONS: Primary | ICD-10-CM

## 2023-11-08 PROBLEM — D61.818 PANCYTOPENIA (HCC): Status: ACTIVE | Noted: 2023-11-08

## 2023-11-08 PROBLEM — D69.6 DISORDER INVOLVING THROMBOCYTOPENIA (HCC): Status: ACTIVE | Noted: 2023-11-08

## 2023-11-08 PROBLEM — T14.90XA TRAUMA: Status: ACTIVE | Noted: 2021-12-12

## 2023-11-08 PROBLEM — K74.60 HEPATIC CIRRHOSIS (HCC): Status: ACTIVE | Noted: 2023-11-08

## 2023-11-08 PROBLEM — D73.1 HYPERSPLENISM: Status: ACTIVE | Noted: 2023-11-08

## 2023-11-08 PROBLEM — S06.33AA INTRAPARENCHYMAL HEMATOMA OF BRAIN (HCC): Status: ACTIVE | Noted: 2021-12-14

## 2023-11-08 PROBLEM — S72.002A FRACTURE OF FEMORAL NECK, LEFT (HCC): Status: ACTIVE | Noted: 2021-12-14

## 2023-11-08 PROCEDURE — G8399 PT W/DXA RESULTS DOCUMENT: HCPCS | Performed by: INTERNAL MEDICINE

## 2023-11-08 PROCEDURE — 99214 OFFICE O/P EST MOD 30 MIN: CPT | Performed by: INTERNAL MEDICINE

## 2023-11-08 PROCEDURE — 1036F TOBACCO NON-USER: CPT | Performed by: INTERNAL MEDICINE

## 2023-11-08 PROCEDURE — G8484 FLU IMMUNIZE NO ADMIN: HCPCS | Performed by: INTERNAL MEDICINE

## 2023-11-08 PROCEDURE — 1090F PRES/ABSN URINE INCON ASSESS: CPT | Performed by: INTERNAL MEDICINE

## 2023-11-08 PROCEDURE — G8428 CUR MEDS NOT DOCUMENT: HCPCS | Performed by: INTERNAL MEDICINE

## 2023-11-08 PROCEDURE — 1123F ACP DISCUSS/DSCN MKR DOCD: CPT | Performed by: INTERNAL MEDICINE

## 2023-11-08 PROCEDURE — G8417 CALC BMI ABV UP PARAM F/U: HCPCS | Performed by: INTERNAL MEDICINE

## 2023-11-08 PROCEDURE — 93000 ELECTROCARDIOGRAM COMPLETE: CPT | Performed by: INTERNAL MEDICINE

## 2023-11-08 ASSESSMENT — PATIENT HEALTH QUESTIONNAIRE - PHQ9
SUM OF ALL RESPONSES TO PHQ QUESTIONS 1-9: 0
SUM OF ALL RESPONSES TO PHQ9 QUESTIONS 1 & 2: 0
2. FEELING DOWN, DEPRESSED OR HOPELESS: 0
SUM OF ALL RESPONSES TO PHQ QUESTIONS 1-9: 0
SUM OF ALL RESPONSES TO PHQ QUESTIONS 1-9: 0
1. LITTLE INTEREST OR PLEASURE IN DOING THINGS: 0
SUM OF ALL RESPONSES TO PHQ QUESTIONS 1-9: 0

## 2023-11-08 ASSESSMENT — ANXIETY QUESTIONNAIRES
1. FEELING NERVOUS, ANXIOUS, OR ON EDGE: 0
4. TROUBLE RELAXING: 0
5. BEING SO RESTLESS THAT IT IS HARD TO SIT STILL: 0
2. NOT BEING ABLE TO STOP OR CONTROL WORRYING: 0
6. BECOMING EASILY ANNOYED OR IRRITABLE: 0
7. FEELING AFRAID AS IF SOMETHING AWFUL MIGHT HAPPEN: 0
GAD7 TOTAL SCORE: 0
3. WORRYING TOO MUCH ABOUT DIFFERENT THINGS: 0

## 2023-11-08 NOTE — PROGRESS NOTES
Benji Levine presents today for   Chief Complaint   Patient presents with    Follow-up     1 year f/u with no concerns    Cardiac Clearance     Cataract surgery at [facility] surgery with unknown date as of now        Yanetdong Levine preferred language for health care discussion is english/other. Is someone accompanying this pt? no    Is the patient using any DME equipment during OV? no    Depression Screening:  Depression: Not at risk (11/8/2023)    PHQ-2     PHQ-2 Score: 0        Learning Assessment:  Who is the primary learner? Patient    What is the preferred language for health care of the primary learner? ENGLISH    How does the primary learner prefer to learn new concepts? DEMONSTRATION    Answered By patient    Relationship to Learner SELF           Pt currently taking Anticoagulant therapy? no    Pt currently taking Antiplatelet therapy ? no      Coordination of Care:  1. Have you been to the ER, urgent care clinic since your last visit? Hospitalized since your last visit? no    2. Have you seen or consulted any other health care providers outside of the [address] [place] [address] since your last visit? Include any pap smears or colon screening.  no

## 2023-12-28 RX ORDER — AMLODIPINE BESYLATE 5 MG/1
5 TABLET ORAL DAILY
Qty: 90 TABLET | OUTPATIENT
Start: 2023-12-28

## 2024-01-11 ENCOUNTER — ANESTHESIA EVENT (OUTPATIENT)
Facility: HOSPITAL | Age: 79
End: 2024-01-11
Payer: MEDICARE

## 2024-01-11 NOTE — PROGRESS NOTES
Instructions for your procedure at [facility]      Today's Date: 1/11/2024      Patient's Name: Jen Torres      Procedure Date: 2/1        Please enter the main entrance of the hospital and check-in at the  located in the lobby.      Do NOT eat or drink anything, including candy, gum, or ice chips after midnight prior to your procedure, unless it is part of your prep.  Brush your teeth before coming to the hospital.You may swish with water, but do not swallow.  No smoking/Vaping/E-Cigarettes 24 hours prior to the day of procedure.  No alcohol 24 hours prior to the day of procedure.  No recreational drugs for one week prior to the day of procedure.  Bring Photo ID, Insurance information, and Co-pay if required on day of procedure.  Bring in pertinent legal documents, such as, Medical Power of , DNR, Advance Directive, etc.  Leave all other valuables, including money/purse, at home.  Remove jewelry, including ALL body piercings, nail polish, acrylic nails, and makeup (including mascara); no lotions, powders, deodorant, and/or perfume/cologne/after shave on the skin.  Glasses and dentures may be worn to the hospital.  They must be removed prior to procedure. Please bring case/container for glasses or dentures.  11. Contacts should not be worn on day of procedure.   12. Call the office ([phone]) if you have symptoms of a cold or illness within 24-48 hours prior to your procedure.   13. AN ADULT (relative or friend 18 years or older) MUST DRIVE YOU HOME AFTER YOUR PROCEDURE.   14. Please make arrangements for a responsible adult (18 years or older) to be with you for 24 hours after your procedure.   15. ONE VISITOR will be allowed in the waiting area during your procedure.       Special Instructions:      Bring list of CURRENT medications.  Follow instructions from the office regarding Bowel Prep, Vitamins, Iron, Blood Thinners, Insulin, Seizure, and Blood  Pressure/Heart medications.      Any questions regarding prep, please call the office at [phone].    For any questions or concerns on the day of procedure, please call the Endo Suite at [phone].    These surgical instructions were reviewed with Jen Torres during the PAT phone call.

## 2024-01-22 RX ORDER — AMLODIPINE BESYLATE 5 MG/1
5 TABLET ORAL DAILY
Qty: 90 TABLET | Refills: 3 | Status: SHIPPED | OUTPATIENT
Start: 2024-01-22

## 2024-02-01 ENCOUNTER — ANESTHESIA (OUTPATIENT)
Facility: HOSPITAL | Age: 79
End: 2024-02-01
Payer: MEDICARE

## 2024-02-01 ENCOUNTER — HOSPITAL ENCOUNTER (OUTPATIENT)
Facility: HOSPITAL | Age: 79
Setting detail: OUTPATIENT SURGERY
Discharge: HOME | End: 2024-02-01
Attending: INTERNAL MEDICINE | Admitting: INTERNAL MEDICINE
Payer: MEDICARE

## 2024-02-01 VITALS
RESPIRATION RATE: 16 BRPM | TEMPERATURE: 97.8 F | BODY MASS INDEX: 24.71 KG/M2 | SYSTOLIC BLOOD PRESSURE: 146 MMHG | HEART RATE: 76 BPM | WEIGHT: 163 LBS | DIASTOLIC BLOOD PRESSURE: 56 MMHG | HEIGHT: 68 IN | OXYGEN SATURATION: 100 %

## 2024-02-01 PROCEDURE — 3700000001 HC ADD 15 MINUTES (ANESTHESIA): Performed by: INTERNAL MEDICINE

## 2024-02-01 PROCEDURE — 2500000003 HC RX 250 WO HCPCS: Performed by: NURSE ANESTHETIST, CERTIFIED REGISTERED

## 2024-02-01 PROCEDURE — 2709999900 HC NON-CHARGEABLE SUPPLY: Performed by: INTERNAL MEDICINE

## 2024-02-01 PROCEDURE — 6360000002 HC RX W HCPCS: Performed by: NURSE ANESTHETIST, CERTIFIED REGISTERED

## 2024-02-01 PROCEDURE — 3600007512: Performed by: INTERNAL MEDICINE

## 2024-02-01 PROCEDURE — 3700000000 HC ANESTHESIA ATTENDED CARE: Performed by: INTERNAL MEDICINE

## 2024-02-01 PROCEDURE — 7100000010 HC PHASE II RECOVERY - FIRST 15 MIN: Performed by: INTERNAL MEDICINE

## 2024-02-01 PROCEDURE — 2580000003 HC RX 258: Performed by: NURSE ANESTHETIST, CERTIFIED REGISTERED

## 2024-02-01 PROCEDURE — 2720000010 HC SURG SUPPLY STERILE: Performed by: INTERNAL MEDICINE

## 2024-02-01 PROCEDURE — 7100000000 HC PACU RECOVERY - FIRST 15 MIN: Performed by: INTERNAL MEDICINE

## 2024-02-01 PROCEDURE — 3600007502: Performed by: INTERNAL MEDICINE

## 2024-02-01 RX ORDER — DEXTROSE MONOHYDRATE 100 MG/ML
INJECTION, SOLUTION INTRAVENOUS CONTINUOUS PRN
Status: DISCONTINUED | OUTPATIENT
Start: 2024-02-01 | End: 2024-02-01 | Stop reason: HOSPADM

## 2024-02-01 RX ORDER — LIDOCAINE HYDROCHLORIDE 20 MG/ML
INJECTION, SOLUTION EPIDURAL; INFILTRATION; INTRACAUDAL; PERINEURAL PRN
Status: DISCONTINUED | OUTPATIENT
Start: 2024-02-01 | End: 2024-02-01 | Stop reason: SDUPTHER

## 2024-02-01 RX ORDER — SODIUM CHLORIDE 9 MG/ML
INJECTION, SOLUTION INTRAVENOUS PRN
Status: DISCONTINUED | OUTPATIENT
Start: 2024-02-01 | End: 2024-02-01 | Stop reason: HOSPADM

## 2024-02-01 RX ORDER — SODIUM CHLORIDE, SODIUM LACTATE, POTASSIUM CHLORIDE, CALCIUM CHLORIDE 600; 310; 30; 20 MG/100ML; MG/100ML; MG/100ML; MG/100ML
INJECTION, SOLUTION INTRAVENOUS CONTINUOUS
Status: DISCONTINUED | OUTPATIENT
Start: 2024-02-01 | End: 2024-02-01 | Stop reason: HOSPADM

## 2024-02-01 RX ORDER — PROPOFOL 10 MG/ML
INJECTION, EMULSION INTRAVENOUS PRN
Status: DISCONTINUED | OUTPATIENT
Start: 2024-02-01 | End: 2024-02-01 | Stop reason: SDUPTHER

## 2024-02-01 RX ORDER — SODIUM CHLORIDE 0.9 % (FLUSH) 0.9 %
5-40 SYRINGE (ML) INJECTION PRN
Status: DISCONTINUED | OUTPATIENT
Start: 2024-02-01 | End: 2024-02-01 | Stop reason: HOSPADM

## 2024-02-01 RX ORDER — SODIUM CHLORIDE 0.9 % (FLUSH) 0.9 %
5-40 SYRINGE (ML) INJECTION EVERY 12 HOURS SCHEDULED
Status: DISCONTINUED | OUTPATIENT
Start: 2024-02-01 | End: 2024-02-01 | Stop reason: HOSPADM

## 2024-02-01 RX ORDER — INSULIN LISPRO 100 [IU]/ML
0-12 INJECTION, SOLUTION INTRAVENOUS; SUBCUTANEOUS ONCE
Status: DISCONTINUED | OUTPATIENT
Start: 2024-02-01 | End: 2024-02-01 | Stop reason: HOSPADM

## 2024-02-01 RX ADMIN — PROPOFOL 40 MG: 10 INJECTION, EMULSION INTRAVENOUS at 10:22

## 2024-02-01 RX ADMIN — PROPOFOL 20 MG: 10 INJECTION, EMULSION INTRAVENOUS at 10:24

## 2024-02-01 RX ADMIN — PROPOFOL 20 MG: 10 INJECTION, EMULSION INTRAVENOUS at 10:33

## 2024-02-01 RX ADMIN — PROPOFOL 20 MG: 10 INJECTION, EMULSION INTRAVENOUS at 10:30

## 2024-02-01 RX ADMIN — PROPOFOL 20 MG: 10 INJECTION, EMULSION INTRAVENOUS at 10:27

## 2024-02-01 RX ADMIN — LIDOCAINE HYDROCHLORIDE 60 MG: 20 INJECTION, SOLUTION EPIDURAL; INFILTRATION; INTRACAUDAL; PERINEURAL at 10:22

## 2024-02-01 RX ADMIN — SODIUM CHLORIDE, POTASSIUM CHLORIDE, SODIUM LACTATE AND CALCIUM CHLORIDE: 600; 310; 30; 20 INJECTION, SOLUTION INTRAVENOUS at 09:51

## 2024-02-01 ASSESSMENT — PAIN SCALES - GENERAL
PAINLEVEL_OUTOF10: 0
PAINLEVEL_OUTOF10: 0

## 2024-02-01 ASSESSMENT — PAIN - FUNCTIONAL ASSESSMENT
PAIN_FUNCTIONAL_ASSESSMENT: 0-10
PAIN_FUNCTIONAL_ASSESSMENT: 0-10

## 2024-02-01 NOTE — H&P
Chief Complaint: Has cirrhosis and portal HTN    History of present illness: Needs variceal banding.    PMH:   Past Medical History:   Diagnosis Date    Abscess of left kidney 2/16/2015    Acute deep vein thrombosis of right lower extremity (HCC) 3/02/2015    Anticoagulated by anticoagulation treatment 3/06/2015    On Rivaroxaban    Aortic valve stenosis     Bell's palsy 04/22/2017    no drooping    Candidal urinary tract infection 3/12/2015    Chronic anemia     Chronic diastolic heart failure (HCC)     denies 10/2/19    Chronic pain     RT knee    Citrobacter infection 2/10/2015    Urinary tract infection    COVID-19 vaccine series completed     Decubitus ulcer of sacral region, stage 2 (HCC) 2015    Diabetes (HCC)     no meds    Dyslipidemia     Glaucoma     Heart failure (HCC)     Heart murmur 2020    History of hypertension     History of kidney stones 6/19/2012    Hypertension     no medications    Hypoalbuminemia     Liver disease     non alcoholic cirrhosis    MARTINES (nonalcoholic steatohepatitis)     Obesity, Class II, BMI 35-39.9     Pneumothorax on right 02/27/2015    Postoperative anemia due to acute blood loss     Sepsis (HCC) 2015    Thrombocytopenia (HCC)     Dr. Short     Allergies:   Allergies   Allergen Reactions    Brimonidine Tartrate      Other reaction(s): periorbital itching    Timolol Maleate Itching     Other reaction(s): itching, periorbital itching    Amoxicillin-Pot Clavulanate Other (See Comments)     Mouth sores    Rivaroxaban      Other reaction(s): Unknown    Cefepime Rash     Arm rashes, chest rashes    Ciprofloxacin Rash     Other reaction(s): mild rash/itching  Only w/ IV Cipro, takes PO Cipro without problem  Other reaction(s): rash, rash/itching  Only w/ IV Cipro, takes PO Cipro without problem      Dorzolamide Itching     Other reaction(s): itching     Medications:   Current Facility-Administered Medications:     insulin lispro (HUMALOG) injection vial 0-12 Units, 0-12 Units,  SubCUTAneous, Once, Dayanna Fierro APRN - CRNA    glucose chewable tablet 16 g, 4 tablet, Oral, PRN, Dayanna Fierro APRN - CRNA    dextrose bolus 10% 125 mL, 125 mL, IntraVENous, PRN **OR** dextrose bolus 10% 250 mL, 250 mL, IntraVENous, PRN, Dayanna Fierro APRN - CRNA    glucagon injection 1 mg, 1 mg, SubCUTAneous, PRN, Dayanna Fierro APRN - CRNA    dextrose 10 % infusion, , IntraVENous, Continuous PRN, Dayanna Fierro APRN - CRNA    lactated ringers IV soln infusion, , IntraVENous, Continuous, Dayanna Fierro APRN - CRNA, Last Rate: 75 mL/hr at 24, New Bag at 24    sodium chloride flush 0.9 % injection 5-40 mL, 5-40 mL, IntraVENous, 2 times per day, Dayanna Fierro APRN - CRNA    sodium chloride flush 0.9 % injection 5-40 mL, 5-40 mL, IntraVENous, PRN, Dayanna Fierro APRN - CRNA    0.9 % sodium chloride infusion, , IntraVENous, PRN, Dayanna Fierro APRN - CRNA    famotidine (PEPCID) 20 mg in sodium chloride (PF) 0.9 % 10 mL injection, 20 mg, IntraVENous, Once, Dayanna Fierro APRN - CRNA  FH:   Family History   Problem Relation Age of Onset    No Known Problems Mother     Heart Attack Father     No Known Problems Sister     Heart Attack Brother     No Known Problems Maternal Grandmother     No Known Problems Maternal Grandfather     No Known Problems Paternal Grandmother     No Known Problems Paternal Grandfather     Breast Cancer Paternal Aunt     No Known Problems Other      Social:   Social History     Socioeconomic History    Marital status:      Spouse name: None    Number of children: None    Years of education: None    Highest education level: None   Tobacco Use    Smoking status: Never    Smokeless tobacco: Never   Vaping Use    Vaping Use: Never used   Substance and Sexual Activity    Alcohol use: Never    Drug use: Never     Surgical H:   Past Surgical History:   Procedure Laterality Date     SECTION      COLONOSCOPY N/A 2022

## 2024-02-01 NOTE — PERIOP NOTE
Patient is a diet controlled diabetic and not currently taking any diabetic medications. No blood sugar check required.

## 2024-02-01 NOTE — ANESTHESIA POSTPROCEDURE EVALUATION
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 (HCC) [K75.81, K74.60])      (Hypersplenism syndrome [D73.1])      (Esophageal varices without bleeding, unspecified esophageal varices type (HCC) [I85.00])    Surgeons: Seth Thornton MD Responsible Provider: Avtar Holliday MD    Anesthesia Type: MAC ASA Status: 3            Anesthesia Type: MAC    Court Phase I: Court Score: 8    Court Phase II: Court Score: 10    Anesthesia Post Evaluation    Patient location during evaluation: bedside  Patient participation: complete - patient participated  Level of consciousness: awake  Pain score: 0  Airway patency: patent  Nausea & Vomiting: no nausea  Cardiovascular status: hemodynamically stable  Respiratory status: acceptable  Hydration status: euvolemic  Pain management: satisfactory to patient        No notable events documented.

## 2024-02-01 NOTE — BRIEF OP NOTE
[phone]    [facility]  [address] [place] [address]      Brief Procedure Note    Jen Torres  1945  [id number]    Date of Procedure: 2/1/2024     Preoperative diagnosis: Liver cirrhosis secondary to MARTINES (HCC) [K75.81, K74.60]  Hypersplenism syndrome [D73.1]  Esophageal varices without bleeding, unspecified esophageal varices type (HCC) [I85.00]    Postoperative diagnosis: Liver cirrhosis secondary to MARTINES (HCC) [K75.81, K74.60]  Hypersplenism syndrome [D73.1]  Esophageal varices without bleeding, unspecified esophageal varices type (HCC) [I85.00]  Banding of varices done. Three bands applied    Type of Anesthesia: MAC (Monitored anesthesia care)    Description of findings: same as post op dx    Procedure: Procedure(s):  EGD ESOPHAGOGASTRODUODENOSCOPY with banding    :  Dr. Seth Thornton MD    Assistant(s): Circulator: Isai Gutierrez RN; Chrissy Henson RN    Devices/implants/grafts/tissues/prosthesis: None    EBL:None    Specimens: * No specimens in log *    Findings: See printed and scanned procedure note    Complications: None    Dr. Seth Thornton MD  2/1/2024  10:42 AM

## 2024-02-01 NOTE — ANESTHESIA PRE PROCEDURE
Cardiovascular:  Exercise tolerance: good (>4 METS)  (+) hypertension:, hyperlipidemia        Rhythm: regular  Rate: normal                    Neuro/Psych:   Negative Neuro/Psych ROS              GI/Hepatic/Renal:             Endo/Other: Negative Endo/Other ROS   (+) DiabetesType II DM.                  ROS comment: Diet controlled Abdominal:             Vascular: negative vascular ROS.         Other Findings:       Anesthesia Plan      MAC     ASA 3       Induction: intravenous.      Anesthetic plan and risks discussed with patient.        Attending anesthesiologist reviewed and agrees with Preprocedure content            Avtar Holliday MD   2/1/2024

## 2024-02-20 ENCOUNTER — TELEPHONE (OUTPATIENT)
Age: 79
End: 2024-02-20

## 2024-02-20 NOTE — TELEPHONE ENCOUNTER
Received fax requesting cardiac clearance for phaco with IOL from [facility]. Surgery date is 3/5/24. Will discuss with Dr. Canchola.

## 2024-02-21 NOTE — TELEPHONE ENCOUNTER
Verbal order and read back per Grisel Canchola, DO  Patient can proceed at moderate risk.   Clearance form and last office note +EKG faxed to [phone]

## 2024-05-23 ENCOUNTER — TRANSCRIBE ORDERS (OUTPATIENT)
Facility: HOSPITAL | Age: 79
End: 2024-05-23

## 2024-05-23 DIAGNOSIS — Z12.31 VISIT FOR SCREENING MAMMOGRAM: Primary | ICD-10-CM

## 2024-05-31 NOTE — PROGRESS NOTES
Caryle Lo    Aortic stenosis    HPI    Caryle Lo is a 68 y.o. a pleasant female with no known heart disease who established with me last year in 2019 in the setting of a murmur. As you know patient has had a lot of chronic medical problems and was hospitalized a couple years ago with several complications. She apparently did have episodes of fluid overload, hypertension, and diabetes-but is not on any chronic medications for these conditions and denies any recent problems. I do know that she follows with GI- she tells me she has MARTINES and also recently had an EGD (for varices) and was asked to start Nadolol. She is afraid to take this and comes asking me if she should. I know that coincidentally several years ago a heart murmur was noticed when she was in preop. This led to screening echocardiograms that showed aortic sclerosis without any significant stenosis. More recently the quality of her murmur changed and she had another repeat echocardiogram that finally showed some mild aortic stenosis. Please see this report below. I independently obtained and reviewed this report with the patient today. She completely denies chest discomfort, no shortness of breath she has never passed out. She can occasionally see her sock line on her legs but really does not have any problems with swelling as this is longstanding and under decent control. Despite the pandemic she tries to stay active in her house and yard and go walking.     Past Medical History:   Diagnosis Date    Abscess of left kidney 2/16/2015    Acute deep vein thrombosis of right lower extremity ([address]) 3/02/2015    Anticoagulated by anticoagulation treatment 3/06/2015    On Rivaroxaban    Bell's palsy 04/22/2017    no drooping    Candidal urinary tract infection 3/12/2015    Chronic anemia     Chronic diastolic heart failure ([address])     denies 10/2/19    Chronic pain     RT knee    Citrobacter infection 2/10/2015    Urinary tract infection Using CPAP, no issues.

## 2024-06-05 ENCOUNTER — HOSPITAL ENCOUNTER (OUTPATIENT)
Facility: HOSPITAL | Age: 79
Discharge: HOME | End: 2024-06-08
Payer: MEDICARE

## 2024-06-05 VITALS — HEIGHT: 69 IN | BODY MASS INDEX: 25.18 KG/M2 | WEIGHT: 170 LBS

## 2024-06-05 DIAGNOSIS — Z12.31 VISIT FOR SCREENING MAMMOGRAM: ICD-10-CM

## 2024-06-05 PROCEDURE — 77063 BREAST TOMOSYNTHESIS BI: CPT

## 2024-08-02 ENCOUNTER — OFFICE VISIT (OUTPATIENT)
Age: 79
End: 2024-08-02

## 2024-08-02 VITALS
DIASTOLIC BLOOD PRESSURE: 56 MMHG | WEIGHT: 184 LBS | OXYGEN SATURATION: 97 % | BODY MASS INDEX: 27.25 KG/M2 | SYSTOLIC BLOOD PRESSURE: 130 MMHG | HEIGHT: 69 IN | HEART RATE: 88 BPM

## 2024-08-02 DIAGNOSIS — R00.2 PALPITATIONS: ICD-10-CM

## 2024-08-02 DIAGNOSIS — I50.32 CHRONIC DIASTOLIC HEART FAILURE (HCC): ICD-10-CM

## 2024-08-02 DIAGNOSIS — I35.0 NONRHEUMATIC AORTIC (VALVE) STENOSIS: Primary | ICD-10-CM

## 2024-08-02 NOTE — PROGRESS NOTES
Jen Torres presents today for   Chief Complaint   Patient presents with    Follow-up     8 month f/u     Dizziness     Dizzy spells     Shortness of Breath     SOB with exertion     Edema     Bilateral ankle edema at night        Jen Torres preferred language for health care discussion is english/other.    Is someone accompanying this pt? no    Is the patient using any DME equipment during OV? no    Depression Screening:  Depression: Not at risk (11/8/2023)    PHQ-2     PHQ-2 Score: 0        Learning Assessment:  No question data found.       Pt currently taking Anticoagulant therapy? no    Pt currently taking Antiplatelet therapy ? no      Coordination of Care:  1. Have you been to the ER, urgent care clinic since your last visit? Hospitalized since your last visit? no    2. Have you seen or consulted any other health care providers outside of the [facility] since your last visit? Include any pap smears or colon screening. no    
No JVD.   Cardiovascular:      Rate and Rhythm: Normal rate and regular rhythm.      Heart sounds: Murmur heard.     No friction rub. No gallop.      Comments: +GEORGINA, early peaking, radiates to carotids, upstroke WNL  Pulmonary:      Effort: Pulmonary effort is normal. No respiratory distress.      Breath sounds: Normal breath sounds. No wheezing or rales.   Chest:      Chest wall: No tenderness.   Abdominal:      General: Bowel sounds are normal.      Palpations: Abdomen is soft.   Musculoskeletal:         General: No tenderness.   Skin:     General: Skin is warm and dry.   Neurological:      Mental Status: She is alert and oriented to person, place, and time.       EKG today shows: NSR, normal axis and intervals, no ST segment abnormalities    Impression and Plan:  Jen Torres is a 77 y.o. with:    Aortic stenosis, moderate to severe, FREDRICK 1  HTN, controlled with Norvasc  DM2  Dyslipidemia  Esophageal varices  H/o DVT and reaction to Xarelto?  S/p ileostomy reversal    Following with Dr. Marti has ongoing workup  Continues to be asymptomatic  RTC 6 months, call me sooner if needed  45 mins, with coordination of care, review of testing    Thank you for allowing me to participate in the care of your patient, please do not hesitate to call with questions or concerns.      Kindest Regards    Grisel Canchola, DO

## 2024-09-05 ENCOUNTER — HOSPITAL ENCOUNTER (OUTPATIENT)
Facility: HOSPITAL | Age: 79
Discharge: HOME | End: 2024-09-08
Payer: MEDICARE

## 2024-09-05 DIAGNOSIS — R10.13 ABDOMINAL PAIN, EPIGASTRIC: ICD-10-CM

## 2024-09-05 PROCEDURE — 76705 ECHO EXAM OF ABDOMEN: CPT

## 2024-12-09 ENCOUNTER — HOSPITAL ENCOUNTER (OUTPATIENT)
Facility: HOSPITAL | Age: 79
Discharge: HOME | End: 2024-12-12
Attending: INTERNAL MEDICINE
Payer: MEDICARE

## 2024-12-09 DIAGNOSIS — N18.4 CHRONIC KIDNEY DISEASE, STAGE IV (SEVERE) (HCC): ICD-10-CM

## 2024-12-09 DIAGNOSIS — E11.22 DIABETES MELLITUS WITH STAGE 4 CHRONIC KIDNEY DISEASE (HCC): ICD-10-CM

## 2024-12-09 DIAGNOSIS — I35.0 NODULAR CALCIFIC AORTIC VALVE STENOSIS: ICD-10-CM

## 2024-12-09 DIAGNOSIS — N18.4 DIABETES MELLITUS WITH STAGE 4 CHRONIC KIDNEY DISEASE (HCC): ICD-10-CM

## 2024-12-09 DIAGNOSIS — K74.60 HEPATIC CIRRHOSIS, UNSPECIFIED HEPATIC CIRRHOSIS TYPE, UNSPECIFIED WHETHER ASCITES PRESENT (HCC): ICD-10-CM

## 2024-12-09 DIAGNOSIS — N18.4 HYPERTENSIVE KIDNEY DISEASE WITH CHRONIC KIDNEY DISEASE STAGE IV (HCC): ICD-10-CM

## 2024-12-09 DIAGNOSIS — I12.9 HYPERTENSIVE KIDNEY DISEASE WITH CHRONIC KIDNEY DISEASE STAGE IV (HCC): ICD-10-CM

## 2024-12-09 DIAGNOSIS — Z93.3 COLOSTOMY STATUS (HCC): ICD-10-CM

## 2024-12-09 PROCEDURE — 76770 US EXAM ABDO BACK WALL COMP: CPT

## 2025-01-10 RX ORDER — AMLODIPINE BESYLATE 5 MG/1
5 TABLET ORAL DAILY
Qty: 90 TABLET | Refills: 3 | Status: SHIPPED | OUTPATIENT
Start: 2025-01-10

## 2025-03-19 ENCOUNTER — OFFICE VISIT (OUTPATIENT)
Age: 80
End: 2025-03-19
Payer: MEDICARE

## 2025-03-19 VITALS
SYSTOLIC BLOOD PRESSURE: 132 MMHG | BODY MASS INDEX: 25.77 KG/M2 | HEIGHT: 69 IN | DIASTOLIC BLOOD PRESSURE: 68 MMHG | OXYGEN SATURATION: 96 % | HEART RATE: 73 BPM | WEIGHT: 174 LBS

## 2025-03-19 DIAGNOSIS — R00.2 PALPITATIONS: ICD-10-CM

## 2025-03-19 DIAGNOSIS — I35.0 NONRHEUMATIC AORTIC (VALVE) STENOSIS: ICD-10-CM

## 2025-03-19 DIAGNOSIS — I50.32 CHRONIC DIASTOLIC HEART FAILURE (HCC): Primary | ICD-10-CM

## 2025-03-19 PROCEDURE — G8428 CUR MEDS NOT DOCUMENT: HCPCS | Performed by: INTERNAL MEDICINE

## 2025-03-19 PROCEDURE — 99214 OFFICE O/P EST MOD 30 MIN: CPT | Performed by: INTERNAL MEDICINE

## 2025-03-19 PROCEDURE — 1090F PRES/ABSN URINE INCON ASSESS: CPT | Performed by: INTERNAL MEDICINE

## 2025-03-19 PROCEDURE — G8417 CALC BMI ABV UP PARAM F/U: HCPCS | Performed by: INTERNAL MEDICINE

## 2025-03-19 PROCEDURE — 1123F ACP DISCUSS/DSCN MKR DOCD: CPT | Performed by: INTERNAL MEDICINE

## 2025-03-19 PROCEDURE — 93000 ELECTROCARDIOGRAM COMPLETE: CPT | Performed by: INTERNAL MEDICINE

## 2025-03-19 PROCEDURE — 1036F TOBACCO NON-USER: CPT | Performed by: INTERNAL MEDICINE

## 2025-03-19 PROCEDURE — G8399 PT W/DXA RESULTS DOCUMENT: HCPCS | Performed by: INTERNAL MEDICINE

## 2025-03-19 PROCEDURE — 1126F AMNT PAIN NOTED NONE PRSNT: CPT | Performed by: INTERNAL MEDICINE

## 2025-03-19 ASSESSMENT — PATIENT HEALTH QUESTIONNAIRE - PHQ9
SUM OF ALL RESPONSES TO PHQ QUESTIONS 1-9: 0
2. FEELING DOWN, DEPRESSED OR HOPELESS: NOT AT ALL
1. LITTLE INTEREST OR PLEASURE IN DOING THINGS: NOT AT ALL
SUM OF ALL RESPONSES TO PHQ QUESTIONS 1-9: 0

## 2025-03-19 NOTE — PROGRESS NOTES
Jen Torres    Aortic stenosis    HPI    Jen Torrse is a 79 y.o. a pleasant female with no known heart disease who established with me last year in 2019 in the setting of a murmur.  As you know patient has had a lot of chronic medical problems and was hospitalized a couple years ago with several complications.  She apparently did have episodes of fluid overload, hypertension, and diabetes-but is not on any chronic medications for these conditions and denies any recent problems.    I do know that she follows with GI- she tells me she has MARTINES and also recently had an EGD (for varices) and was asked to start Nadolol. She is afraid to take this and comes asking me if she should.    I know that coincidentally several years ago a heart murmur was noticed when she was in preop.  This led to screening echocardiograms that showed aortic sclerosis without any significant stenosis.  More recently the quality of her murmur changed and she had another repeat echocardiogram that finally showed some mild aortic stenosis.  Please see this report below.  I independently obtained and reviewed this report with the patient today.    She completely denies chest discomfort, no shortness of breath she has never passed out.  She can occasionally see her sock line on her legs but really does not have any problems with swelling as this is longstanding and under decent control.  Despite the pandemic she tries to stay active in her house and yard and go walking.    Past Medical History:   Diagnosis Date    Abscess of left kidney 2/16/2015    Acute deep vein thrombosis of right lower extremity (HCC) 3/02/2015    Anticoagulated by anticoagulation treatment 3/06/2015    On Rivaroxaban    Bell's palsy 04/22/2017    no drooping    Candidal urinary tract infection 3/12/2015    Chronic anemia     Chronic diastolic heart failure (HCC)     denies 10/2/19    Chronic pain     RT knee    Citrobacter infection 2/10/2015    Urinary tract infection

## 2025-04-11 RX ORDER — AMLODIPINE BESYLATE 5 MG/1
5 TABLET ORAL DAILY
Qty: 90 TABLET | Refills: 3 | Status: SHIPPED | OUTPATIENT
Start: 2025-04-11

## 2025-05-14 ENCOUNTER — TRANSCRIBE ORDERS (OUTPATIENT)
Facility: HOSPITAL | Age: 80
End: 2025-05-14

## 2025-05-14 DIAGNOSIS — Z12.31 VISIT FOR SCREENING MAMMOGRAM: Primary | ICD-10-CM

## 2025-05-27 NOTE — PROGRESS NOTES
Instructions for your procedure at [facility]      Today's Date: 5/27/2025      Patient's Name: Jen Torres      Procedure Date: 6/3/2025        Please enter the main entrance of the hospital and check-in at the  located in the lobby.      Do NOT eat or drink anything, including candy, gum, or ice chips after midnight prior to your procedure, unless it is part of your prep.  Brush your teeth before coming to the hospital.You may swish with water, but do not swallow.  No smoking/Vaping/E-Cigarettes 24 hours prior to the day of procedure.  No alcohol 24 hours prior to the day of procedure.  No recreational drugs for one week prior to the day of procedure.  Bring Photo ID, Insurance information, and Co-pay if required on day of procedure.  Bring in pertinent legal documents, such as, Medical Power of , DNR, Advance Directive, etc.  Leave all other valuables, including money/purse, weapons at home.  Remove jewelry, including ALL body piercings, nail polish, acrylic nails, and makeup (including mascara); no lotions, powders, deodorant, and/or perfume/cologne/after shave on the skin.  Glasses and dentures may be worn to the hospital.  They must be removed prior to procedure. Please bring case/container for glasses or dentures.  11. Contacts should not be worn on day of procedure.   12. Call the office ([phone]) if you have symptoms of a cold or illness within 24-48 hours prior to your procedure.   13. AN ADULT (relative or friend 18 years or older) MUST DRIVE YOU HOME AFTER YOUR PROCEDURE.   14. Please make arrangements for a responsible adult (18 years or older) to be with you for 24 hours after your procedure.   15. TWO VISITORS will be allowed in the waiting area during your procedure.       Special Instructions:      Bring list of CURRENT medications.  Follow instructions from the office regarding Bowel Prep, Vitamins, Iron, Blood Thinners, Insulin, Seizure, and

## 2025-06-02 ENCOUNTER — ANESTHESIA EVENT (OUTPATIENT)
Facility: HOSPITAL | Age: 80
End: 2025-06-02
Payer: MEDICARE

## 2025-06-03 ENCOUNTER — HOSPITAL ENCOUNTER (OUTPATIENT)
Facility: HOSPITAL | Age: 80
Setting detail: OUTPATIENT SURGERY
Discharge: HOME | End: 2025-06-03
Attending: INTERNAL MEDICINE | Admitting: INTERNAL MEDICINE
Payer: MEDICARE

## 2025-06-03 ENCOUNTER — ANESTHESIA (OUTPATIENT)
Facility: HOSPITAL | Age: 80
End: 2025-06-03
Payer: MEDICARE

## 2025-06-03 VITALS
BODY MASS INDEX: 23.67 KG/M2 | DIASTOLIC BLOOD PRESSURE: 89 MMHG | OXYGEN SATURATION: 94 % | TEMPERATURE: 97.4 F | HEART RATE: 90 BPM | SYSTOLIC BLOOD PRESSURE: 151 MMHG | WEIGHT: 160.3 LBS | RESPIRATION RATE: 19 BRPM

## 2025-06-03 LAB
GLUCOSE BLD STRIP.AUTO-MCNC: 120 MG/DL (ref 70–110)
GLUCOSE BLD STRIP.AUTO-MCNC: 94 MG/DL (ref 70–110)

## 2025-06-03 PROCEDURE — 2580000003 HC RX 258: Performed by: NURSE ANESTHETIST, CERTIFIED REGISTERED

## 2025-06-03 PROCEDURE — 2709999900 HC NON-CHARGEABLE SUPPLY: Performed by: INTERNAL MEDICINE

## 2025-06-03 PROCEDURE — 3600007502: Performed by: INTERNAL MEDICINE

## 2025-06-03 PROCEDURE — 3700000000 HC ANESTHESIA ATTENDED CARE: Performed by: INTERNAL MEDICINE

## 2025-06-03 PROCEDURE — 7100000010 HC PHASE II RECOVERY - FIRST 15 MIN: Performed by: INTERNAL MEDICINE

## 2025-06-03 PROCEDURE — 7100000000 HC PACU RECOVERY - FIRST 15 MIN: Performed by: INTERNAL MEDICINE

## 2025-06-03 PROCEDURE — 82962 GLUCOSE BLOOD TEST: CPT

## 2025-06-03 PROCEDURE — 88305 TISSUE EXAM BY PATHOLOGIST: CPT

## 2025-06-03 PROCEDURE — 6360000002 HC RX W HCPCS: Performed by: ANESTHESIOLOGY

## 2025-06-03 RX ORDER — DEXTROSE MONOHYDRATE 100 MG/ML
INJECTION, SOLUTION INTRAVENOUS CONTINUOUS PRN
Status: DISCONTINUED | OUTPATIENT
Start: 2025-06-03 | End: 2025-06-03 | Stop reason: HOSPADM

## 2025-06-03 RX ORDER — PROPOFOL 10 MG/ML
INJECTION, EMULSION INTRAVENOUS
Status: DISCONTINUED | OUTPATIENT
Start: 2025-06-03 | End: 2025-06-03 | Stop reason: SDUPTHER

## 2025-06-03 RX ORDER — LIDOCAINE HYDROCHLORIDE 10 MG/ML
1 INJECTION, SOLUTION EPIDURAL; INFILTRATION; INTRACAUDAL; PERINEURAL
Status: DISCONTINUED | OUTPATIENT
Start: 2025-06-03 | End: 2025-06-03 | Stop reason: HOSPADM

## 2025-06-03 RX ORDER — SODIUM CHLORIDE, SODIUM LACTATE, POTASSIUM CHLORIDE, CALCIUM CHLORIDE 600; 310; 30; 20 MG/100ML; MG/100ML; MG/100ML; MG/100ML
INJECTION, SOLUTION INTRAVENOUS CONTINUOUS
Status: DISCONTINUED | OUTPATIENT
Start: 2025-06-03 | End: 2025-06-03 | Stop reason: HOSPADM

## 2025-06-03 RX ADMIN — PROPOFOL 80 MG: 10 INJECTION, EMULSION INTRAVENOUS at 08:23

## 2025-06-03 RX ADMIN — PROPOFOL 25 MG: 10 INJECTION, EMULSION INTRAVENOUS at 08:26

## 2025-06-03 RX ADMIN — SODIUM CHLORIDE, SODIUM LACTATE, POTASSIUM CHLORIDE, AND CALCIUM CHLORIDE: .6; .31; .03; .02 INJECTION, SOLUTION INTRAVENOUS at 07:48

## 2025-06-03 RX ADMIN — PROPOFOL 25 MG: 10 INJECTION, EMULSION INTRAVENOUS at 08:24

## 2025-06-03 ASSESSMENT — PAIN - FUNCTIONAL ASSESSMENT
PAIN_FUNCTIONAL_ASSESSMENT: 0-10
PAIN_FUNCTIONAL_ASSESSMENT: NONE - DENIES PAIN

## 2025-06-03 NOTE — H&P
Thomas VALE MD at [facility] MAIN OR    COLOSTOMY  09/2015    diverticulitis    COLOSTOMY CLOSURE  01/24/2023    KIDNEY BIOPSY Left 2/16/2015    S/P CT-guided left lower renal pole biopsy (2/16/2015 - Dr. Tavon Domingo)    LITHOTRIPSY      5 times    OTHER SURGICAL HISTORY  8/1977    excision nodule thyroid    OTHER SURGICAL HISTORY  1/26/2015    S/P CT-guided cholecystostomy tube placement (1/26/2015 - Dr. Tavon Domingo)    OTHER SURGICAL HISTORY Left 2/16/2015    S/P CT-guided left lower renal pole abscess drainage and drainage catheter placement (2/16/2015 - Dr. Tavon Domingo)    OTHER SURGICAL HISTORY Left 2/17/2015    S/P Percutaneous drainage of abscess via anterior abdomen (2/17/2015 - Dr. Walt Kennedy)    OTHER SURGICAL HISTORY Right 2/27/2015    S/P Right chest tube insertion (2/27/2015 - Dr. Walt Kennedy)    CA UNLISTED PROCEDURE ABDOMEN PERITONEUM & OMENTUM  2015    Colostomy    RECTAL SURGERY N/A 03/03/2023    SUTURE REMOVAL UNDER ANESTHESIA performed by Thomas Sandy MD at [facility] MAIN OR    THYROIDECTOMY      thyoid nodules    UPPER GASTROINTESTINAL ENDOSCOPY N/A 02/01/2024    EGD ESOPHAGOGASTRODUODENOSCOPY with banding performed by Seth Thornton MD at [facility] ENDOSCOPY       ROS: negative    Physical Exam: BP (!) 130/54   Pulse 82   Temp 97.7 °F (36.5 °C) (Oral)   Resp 23   Wt 72.7 kg (160 lb 4.8 oz)   SpO2 96%   BMI 23.67 kg/m²   General appearance: alert, no distress  Eyes: pupils equal and reactive, extraocular eye movements intact  Nodes: No gross adenopathy in neck.  Skin: no spider angiomata, jaundice, palmar erythema   Respiratory: clear to auscultation bilaterally  Cardiovascular: regular heart rate, no murmurs, no JVD, normal rate and regular rhythm  Abdomen: soft, non-tender, liver not enlarged, spleen not palpable, no obvious ascites  Extremities: no muscle wasting, no gross arthritic changes  Neurologic: alert and oriented, cranial nerves grossly intact, no asterixis    Labs:   Recent

## 2025-06-03 NOTE — ANESTHESIA PRE PROCEDURE
Department of Anesthesiology  Preprocedure Note       Name:  Jen Torres   Age:  79 y.o.  :  1945                                          MRN:  [id number]         Date:  6/3/2025      Surgeon: Surgeon(s):  Seth Thornton MD    Procedure: Procedure(s):  ESOPHAGOGASTRODUODENOSCOPY W/VARICEAL BAND LIGATION    Medications prior to admission:   Prior to Admission medications    Medication Sig Start Date End Date Taking? Authorizing Provider   amLODIPine (NORVASC) 5 MG tablet Take 1 tablet by mouth daily 25  Yes Grisel Canchola, DO   Biotin 2.5 MG CAPS Take by mouth daily   Yes Automatic Reconciliation, Ar   Calcium Carbonate-Vitamin D 600-3.125 MG-MCG TABS Take 1 tablet by mouth daily   Yes Automatic Reconciliation, Ar   dorzolamide (TRUSOPT) 2 % ophthalmic solution Place 1 drop into both eyes 2 times daily 10/31/22  Yes Automatic Reconciliation, Ar   Ferrous Sulfate 324 MG TBEC Take 325 mg by mouth every morning (before breakfast)   Yes Automatic Reconciliation, Ar   latanoprost (XALATAN) 0.005 % ophthalmic solution Place 1 drop into both eyes nightly   Yes Automatic Reconciliation, Ar   potassium chloride (KLOR-CON M) 20 MEQ extended release tablet Take 1 tablet by mouth daily   Yes Automatic Reconciliation, Ar   simvastatin (ZOCOR) 40 MG tablet Take 1 tablet by mouth nightly   Yes Automatic Reconciliation, Ar       Current medications:    Current Facility-Administered Medications   Medication Dose Route Frequency Provider Last Rate Last Admin   • lidocaine PF 1 % injection 1 mL  1 mL IntraDERmal Once PRN May Gastelum APRN - CRNA       • famotidine (PEPCID) 20 MG/2ML 20 mg in sodium chloride (PF) 0.9 % 10 mL injection  20 mg IntraVENous Once May Gastelum APRN - CRNA       • lactated ringers infusion   IntraVENous Continuous May Gastelum APRN - CRNA 50 mL/hr at 25 0748 New Bag at 25 0748   • glucose chewable tablet 16 g  4 tablet Oral PRN May Gastelum APRN - CRNA

## 2025-06-03 NOTE — ANESTHESIA POSTPROCEDURE EVALUATION
Department of Anesthesiology  Postprocedure Note    Patient: Jen Torres  MRN: [id number]  YOB: 1945  Date of evaluation: 6/3/2025    Procedure Summary       Date: 06/03/25 Room / Location: [facility] ENDO 02 / [facility] ENDOSCOPY    Anesthesia Start: 0820 Anesthesia Stop: 0834    Procedure: ESOPHAGOGASTRODUODENOSCOPY W/ Polypectomy (Upper GI Region) Diagnosis:       Esophageal varices without bleeding, unspecified esophageal varices type (HCC)      Portal hypertensive gastropathy (HCC)      (Esophageal varices without bleeding, unspecified esophageal varices type (HCC) [I85.00])      (Portal hypertensive gastropathy (HCC) [K76.6, K31.89])    Surgeons: Seth Thornton MD Responsible Provider: Raji Xiao MD    Anesthesia Type: MAC ASA Status: 3            Anesthesia Type: MAC    Court Phase I: Court Score: 9    Court Phase II: Court Score: 10    Anesthesia Post Evaluation    Patient location during evaluation: bedside  Patient participation: complete - patient participated  Level of consciousness: responsive to verbal stimuli  Airway patency: patent  Nausea & Vomiting: no nausea  Respiratory status: acceptable  Hydration status: euvolemic    No notable events documented.

## 2025-06-24 ENCOUNTER — HOSPITAL ENCOUNTER (OUTPATIENT)
Facility: HOSPITAL | Age: 80
Discharge: HOME | End: 2025-06-27
Payer: MEDICARE

## 2025-06-24 VITALS — BODY MASS INDEX: 23.7 KG/M2 | HEIGHT: 69 IN | WEIGHT: 160 LBS

## 2025-06-24 DIAGNOSIS — Z12.31 VISIT FOR SCREENING MAMMOGRAM: ICD-10-CM

## 2025-06-24 PROCEDURE — 77063 BREAST TOMOSYNTHESIS BI: CPT

## (undated) DEVICE — GAUZE,SPONGE,4"X4",16PLY,STRL,LF,10/TRAY: Brand: MEDLINE

## (undated) DEVICE — FLEX ADVANTAGE 3000CC: Brand: FLEX ADVANTAGE

## (undated) DEVICE — SYR 50ML SLIP TIP NSAF LF STRL --

## (undated) DEVICE — MEDI-VAC SUCTION HIGH CAPACITY: Brand: CARDINAL HEALTH

## (undated) DEVICE — GAUZE SPONGES,16 PLY: Brand: CURITY

## (undated) DEVICE — CATHETER SUCT TR FL TIP 14FR W/ O CTRL

## (undated) DEVICE — CATH IV SAFE STR 22GX1IN BLU -- PROTECTIV PLUS

## (undated) DEVICE — STERILE POLYISOPRENE POWDER-FREE SURGICAL GLOVES: Brand: PROTEXIS

## (undated) DEVICE — YANKAUER,SMOOTH HANDLE,HIGH CAPACITY: Brand: MEDLINE INDUSTRIES, INC.

## (undated) DEVICE — ENDOSCOPY PUMP TUBING/ CAP SET: Brand: ERBE

## (undated) DEVICE — SYRINGE MED 10ML TRNSLUC BRL PLUNG BLK MRK POLYPR CTRL

## (undated) DEVICE — MULTIPLE BAND LIGATOR: Brand: SPEEDBAND SUPERVIEW SUPER 7

## (undated) DEVICE — STAPLER INT L75MM CUT LN L73MM STPL LN L77MM BLU B FRM 8

## (undated) DEVICE — SUTURE PDS II SZ 1 L36IN ABSRB VLT CTXB L48MM 1/2 CIR BLNT ZB371

## (undated) DEVICE — MEDI-VAC NON-CONDUCTIVE SUCTION TUBING: Brand: CARDINAL HEALTH

## (undated) DEVICE — LIGATOR ENDOSCP DIA8.6-11.5MM MULT DISP SPDBND LIGATOR SUP

## (undated) DEVICE — KIT CLN UP BON SECOURS MARYV

## (undated) DEVICE — SUTURE PERMAHAND SZ 0 L30IN NONABSORBABLE BLK SILK BRAID A306H

## (undated) DEVICE — BASIN EMESIS 500CC ROSE 250/CS 60/PLT: Brand: MEDEGEN MEDICAL PRODUCTS, LLC

## (undated) DEVICE — BLANKET WRM AD PREM MISTRAL-AIR

## (undated) DEVICE — SINGLE-USE POLYPECTOMY SNARE: Brand: CAPTIFLEX

## (undated) DEVICE — SYRINGE MED 25GA 3ML L5/8IN SUBQ PLAS W/ DETACH NDL SFTY

## (undated) DEVICE — SYR 10ML LUER LOK 1/5ML GRAD --

## (undated) DEVICE — FLUFF AND POLYMER UNDERPAD,EXTRA HEAVY: Brand: WINGS

## (undated) DEVICE — SYRINGE MED 50ML LUERSLIP TIP

## (undated) DEVICE — FCPS RAD JAW 4LC 240CM W/NDL -- BX/20 RADIAL JAW 4

## (undated) DEVICE — ELECTRODE PT RET AD L9FT HI MOIST COND ADH HYDRGEL CORDED

## (undated) DEVICE — BANDAGE,GAUZE,BULKEE II,4.5"X4.1YD,STRL: Brand: MEDLINE

## (undated) DEVICE — SOLUTION IRRIG 1000ML H2O STRL BLT

## (undated) DEVICE — BASIN EMSIS 16OZ GRAPHITE PLAS KID SHP MOLD GRAD FOR ORAL

## (undated) DEVICE — INTENDED FOR TISSUE SEPARATION, AND OTHER PROCEDURES THAT REQUIRE A SHARP SURGICAL BLADE TO PUNCTURE OR CUT.: Brand: BARD-PARKER SAFETY BLADES SIZE 11, STERILE

## (undated) DEVICE — SYR 10ML CTRL LR LCK NSAF LF --

## (undated) DEVICE — SOLUTION SCRB 4OZ 10% PVP I POVIDONE IOD TOP PAINT EXIDINE

## (undated) DEVICE — 3M™ MEDIPORE™ H SOFT CLOTH SURGICAL TAPE 2864, 4 INCH X 10 YARD (10CM X 9,14M), 12 ROLLS/CASE: Brand: 3M™ MEDIPORE™

## (undated) DEVICE — DRAPE,UNDERBUTTOCKS,PCH,STERILE: Brand: MEDLINE

## (undated) DEVICE — TRAY PREP DRY W/ PREM GLV 2 APPL 6 SPNG 2 UNDPD 1 OVERWRAP

## (undated) DEVICE — FORCEPS BX L240CM JAW DIA2.8MM L CAP W/ NDL MIC MESH TOOTH

## (undated) DEVICE — BITE BLOCK ENDOSCP UNIV AD 6 TO 9.4 MM

## (undated) DEVICE — SYSTEM INF CTRL

## (undated) DEVICE — AIRLIFE™ NASAL OXYGEN CANNULA CURVED, FLARED TIP WITH 14 FOOT (4.3 M) CRUSH-RESISTANT TUBING, OVER-THE-EAR STYLE: Brand: AIRLIFE™

## (undated) DEVICE — GAUZE,SPONGE,4"X4",12PLY,STERILE,LF,2'S: Brand: MEDLINE

## (undated) DEVICE — SOL IRR SOD CL 0.9% 1000ML BTL --

## (undated) DEVICE — LINER SUCT CANSTR 3000CC PLAS SFT PRE ASSEMB W/OUT TBNG W/

## (undated) DEVICE — RELOAD STPL L75MM OPN H3.8MM CLS 1.5MM WIRE DIA0.2MM REG

## (undated) DEVICE — SYR 20ML LL STRL LF --

## (undated) DEVICE — FLEX ADVANTAGE 1500CC: Brand: FLEX ADVANTAGE

## (undated) DEVICE — STAPLER INT L60MM REG TISS BLU B FRM 8 FIRING 2 ROW AUTO

## (undated) DEVICE — ESOPHAGEAL BALLOON DILATATION CATHETER: Brand: CRE FIXED WIRE

## (undated) DEVICE — DRAPE TWL SURG 16X26IN BLU -- 4/PK

## (undated) DEVICE — Device

## (undated) DEVICE — TAPE,CLOTH/SILK,CURAD,3"X10YD,LF,40/CS: Brand: CURAD

## (undated) DEVICE — CANNULA ORIG TL CLR W FOAM CUSHIONS AND 14FT SUPL TB 3 CHN

## (undated) DEVICE — UNDERPAD INCONT W23XL36IN STD BLU POLYPR BK FLUF SFT

## (undated) DEVICE — AIRLIFE™ NASAL OXYGEN CANNULA CURVED, NONFLARED TIP WITH 14 FOOT (4.3 M) CRUSH-RESISTANT TUBING, OVER-THE-EAR STYLE: Brand: AIRLIFE™

## (undated) DEVICE — SUTURE COAT VCRL SZ 0 L18IN ABSRB UD W/O NDL POLYGLACTIN J112T

## (undated) DEVICE — GOWN ISOL IMPERV UNIV, DISP, OPEN BACK, BLUE --

## (undated) DEVICE — (D)GLOVE EXAM LG NITRL NS -- DISC BY MFR NO SUB

## (undated) DEVICE — SUTURE VCRL SZ 2-0 L27IN ABSRB UD L26MM SH 1/2 CIR J417H

## (undated) DEVICE — LEGGINGS, PAIR, 31X48, STERILE: Brand: MEDLINE

## (undated) DEVICE — JELLY,LUBE,STERILE,FLIP TOP,TUBE,4-OZ: Brand: MEDLINE

## (undated) DEVICE — FORCEPS BX L240CM JAW DIA2.4MM ORNG L CAP W/ NDL DISP RAD

## (undated) DEVICE — SUTURE VCRL SZ 0 L27IN ABSRB UD L36MM CT-1 1/2 CIR J260H

## (undated) DEVICE — SNARE ENDOSCP L240CM SHTH DIA24MM LOOP W10MM POLYP RND REINF

## (undated) DEVICE — SUTURE VCRL SZ 3-0 L27IN ABSRB VLT L26MM SH 1/2 CIR J316H

## (undated) DEVICE — PAD ABSRB W8XL10IN ABD HYDROPHOBIC NONWOVEN THCK LAYR CELOS

## (undated) DEVICE — DEVICE INFL 60ML 12ATM CONVENIENT LOK REL HNDL HI PRSS FLX

## (undated) DEVICE — Z INACTIVE USE 2855128 SPONGE GZ 16 PLY WVN COT 4INX4IN  HHH

## (undated) DEVICE — PACK PROCEDURE SURG MAJ W/ BASIN LF

## (undated) DEVICE — CANNULA NSL AD TBNG L14FT STD PVC O2 CRV CONN NONFLARED NSL

## (undated) DEVICE — SNARE POLYP M W27MMXL240CM OVL STIFF DISP CAPTIVATOR

## (undated) DEVICE — TRAP SPEC COLL POLYP POLYSTYR --

## (undated) DEVICE — TRAP SPEC POLYP REM STRNR CLN DSGN MAGNIFYING WIND DISP

## (undated) DEVICE — GARMENT,MEDLINE,DVT,INT,CALF,FOAM,MED: Brand: MEDLINE

## (undated) DEVICE — SUTURE VCRL SZ 0 L18IN ABSRB UD POLYGLACTIN 910 BRAID TIE J912G

## (undated) DEVICE — REM POLYHESIVE ADULT PATIENT RETURN ELECTRODE: Brand: VALLEYLAB

## (undated) DEVICE — LIGHT HANDLE: Brand: DEVON

## (undated) DEVICE — SKIN PREP TRAY 4 COMPARTM TRAY: Brand: MEDLINE INDUSTRIES, INC.

## (undated) DEVICE — SUTURE VCRL SZ 3-0 L27IN ABSRB UD L26MM SH 1/2 CIR J416H

## (undated) DEVICE — SHEET, T, LAPAROTOMY, STERILE: Brand: MEDLINE

## (undated) DEVICE — PAD,ABDOMINAL,8"X10",ST,LF: Brand: MEDLINE

## (undated) DEVICE — SUTURE PROL SZ 0 L30IN NONABSORBABLE BLU L36MM CT-1 1/2 CIR 8424H

## (undated) DEVICE — SPONGE LAPAROTOMY W18XL18IN WHITE STRUNG RADIOPAQUE STERILE

## (undated) DEVICE — (D)GLOVE SURG TRIFLX 7.5 PWD L -- DISC BY MFR USE ITEM 302993

## (undated) DEVICE — LIGATOR ENDOSCP L2.8MM DIA8.6-11.5MM MULT BND SPEEDBAND

## (undated) DEVICE — TRAY,URINE METER,100% SILICONE,16FR10ML: Brand: MEDLINE

## (undated) DEVICE — SNARE VASC SINGLE LOOP 0.035 IN 35 MMX150 CM SYMPRO ELITE

## (undated) DEVICE — SOLUTION IV 1000ML 0.9% SOD CHL

## (undated) DEVICE — THREE-QUARTER SHEET: Brand: CONVERTORS